# Patient Record
Sex: MALE | Race: WHITE | Employment: OTHER | RURAL
[De-identification: names, ages, dates, MRNs, and addresses within clinical notes are randomized per-mention and may not be internally consistent; named-entity substitution may affect disease eponyms.]

---

## 2017-06-15 ENCOUNTER — OFFICE VISIT (OUTPATIENT)
Dept: FAMILY MEDICINE CLINIC | Age: 72
End: 2017-06-15

## 2017-06-15 VITALS
HEART RATE: 62 BPM | SYSTOLIC BLOOD PRESSURE: 144 MMHG | WEIGHT: 233 LBS | OXYGEN SATURATION: 95 % | DIASTOLIC BLOOD PRESSURE: 76 MMHG | HEIGHT: 73 IN | RESPIRATION RATE: 18 BRPM | BODY MASS INDEX: 30.88 KG/M2 | TEMPERATURE: 98.1 F

## 2017-06-15 DIAGNOSIS — Z00.00 PREVENTATIVE HEALTH CARE: ICD-10-CM

## 2017-06-15 DIAGNOSIS — L98.9 NON-HEALING SKIN LESION OF NOSE: Primary | ICD-10-CM

## 2017-06-15 DIAGNOSIS — Z23 ENCOUNTER FOR IMMUNIZATION: ICD-10-CM

## 2017-06-15 DIAGNOSIS — R97.20 ELEVATED PSA: ICD-10-CM

## 2017-06-15 RX ORDER — MUPIROCIN 20 MG/G
OINTMENT TOPICAL DAILY
Qty: 22 G | Refills: 0 | Status: SHIPPED | OUTPATIENT
Start: 2017-06-15 | End: 2017-09-29 | Stop reason: ALTCHOICE

## 2017-06-15 NOTE — PROGRESS NOTES
Sravan Magallanes is a 66 y.o. male who presents to the office today with the following:  Chief Complaint   Patient presents with    Skin Exam     area on nose       HPI  Noticed spot on nose ~6 years ago. Says it has disappeared and reappeared over the years. Usually shows up in the summer and goes away until next summer. Believes it has even resolved for a couple years in a row. Notes lesion is not painful or itchy. Sometimes tingly. Does not bleed or drain, unless picks at it. Picked off last night, now scabbed over. Has been applying Neosporin twice daily. Currently has applied. Otherwise feeling well with no other complaints or acute concerns. Denies any other HEENT sxs. Also never returned for repeat PSA. Will get today. Would also like tdap updated while here. No other wounds/lesions, but gets them frequently, just never comes in. Review of Systems   Constitutional: Negative for chills, fever and malaise/fatigue. HENT: Negative for congestion, ear discharge, ear pain, hearing loss, nosebleeds, sore throat and tinnitus. No other nasal sxs   Eyes: Negative. Negative for blurred vision, double vision, photophobia, pain, discharge and redness. Respiratory: Negative for shortness of breath. Cardiovascular: Negative for chest pain. Gastrointestinal: Negative. Genitourinary: Negative. Skin: Negative for itching and rash. Lesion left external nose   Neurological: Negative for headaches. See HPI. No Known Allergies    Current Outpatient Prescriptions   Medication Sig    mupirocin (BACTROBAN) 2 % ointment Apply  to affected area daily. No current facility-administered medications for this visit. No past medical history on file. No past surgical history on file.     Social History     Social History    Marital status:      Spouse name: N/A    Number of children: N/A    Years of education: N/A     Social History Main Topics    Smoking status: Never Smoker    Smokeless tobacco: Never Used    Alcohol use Yes      Comment: 2    Drug use: No    Sexual activity: Yes     Other Topics Concern    None     Social History Narrative       Family History   Problem Relation Age of Onset    No Known Problems Mother     No Known Problems Father          Physical Exam:  Visit Vitals    /76 (BP 1 Location: Left arm, BP Patient Position: Sitting)    Pulse 62    Temp 98.1 °F (36.7 °C) (Oral)    Resp 18    Ht 6' 0.5\" (1.842 m)    Wt 233 lb (105.7 kg)    SpO2 95%    BMI 31.17 kg/m2     Physical Exam   Constitutional: He is oriented to person, place, and time and well-developed, well-nourished, and in no distress. HENT:   Head: Normocephalic and atraumatic. Right Ear: Tympanic membrane, external ear and ear canal normal.   Left Ear: Tympanic membrane, external ear and ear canal normal.   Nose: No mucosal edema, rhinorrhea, nose lacerations, sinus tenderness or nasal deformity. No epistaxis. No foreign bodies. Right sinus exhibits no maxillary sinus tenderness and no frontal sinus tenderness. Left sinus exhibits no maxillary sinus tenderness and no frontal sinus tenderness. Mouth/Throat: Uvula is midline, oropharynx is clear and moist and mucous membranes are normal.   ~1cm necrotic lesion with dried blood and some dark scabbing, moist from abx ointment but no drainage. Nonttp. Slight indentation on side more prominent L vs R nare. No other abnls. Eyes: Conjunctivae and EOM are normal.   Neck: Normal range of motion. Neck supple. Cardiovascular: Normal rate, regular rhythm and normal heart sounds. Pulmonary/Chest: Effort normal and breath sounds normal.   Abdominal: Soft. There is no tenderness. Lymphadenopathy:     He has no cervical adenopathy. Neurological: He is alert and oriented to person, place, and time. Gait normal.   Skin: Skin is warm and dry.    Psychiatric: Mood and affect normal.   Nursing note and vitals reviewed. Assessment/Plan:    ICD-10-CM ICD-9-CM    1. Non-healing skin lesion of nose L98.9 709.9 REFERRAL TO DERMATOLOGY      mupirocin (BACTROBAN) 2 % ointment      TETANUS, DIPHTHERIA TOXOIDS AND ACELLULAR PERTUSSIS VACCINE (TDAP), IN INDIVIDS. >=7, IM      HSV 1/2 AB, IGM- low suspicion but will check to be sure. 2. Encounter for immunization Z23 V03.89 TETANUS, DIPHTHERIA TOXOIDS AND ACELLULAR PERTUSSIS VACCINE (TDAP), IN INDIVIDS. >=7, IM   3. Preventative health care Z00.00 V70.0 TETANUS, DIPHTHERIA TOXOIDS AND ACELLULAR PERTUSSIS VACCINE (TDAP), IN INDIVIDS. >=7, IM   4. Elevated PSA R97.20 790.93 PSA W/ REFLX FREE PSA      COLLECTION VENOUS BLOOD,VENIPUNCTURE     Rec pt avoid picking!!! Provided ointment to apply short-term while healing due to small amt of yellow crust with scab and then instructed to d/c topicals completely, keep clean, and leave alone. F/u with Dermatology as instructed. Avoid UV exposure.      Follow-up Disposition:  Return in about 3 months (around 9/4/2017) for fasting labs/annual.    Eduar Jackson PA-C

## 2017-06-15 NOTE — MR AVS SNAPSHOT
Visit Information Date & Time Provider Department Dept. Phone Encounter #  
 6/15/2017  2:30 PM John Paul Girard PA-C [address] [id number] Upcoming Health Maintenance Date Due DTaP/Tdap/Td series (1 - Tdap) 10/25/1965 GLAUCOMA SCREENING Q2Y 10/25/2009 INFLUENZA AGE 9 TO ADULT 8/1/2017 MEDICARE YEARLY EXAM 9/27/2017 FOBT Q 1 YEAR AGE 50-75 9/27/2017 Allergies as of 6/15/2017  Review Complete On: 6/15/2017 By: John Paul Girard PA-C No Known Allergies Current Immunizations  Reviewed on 9/26/2016 Name Date Influenza High Dose Vaccine PF 9/26/2016 Pneumococcal Conjugate (PCV-13) 9/26/2016 Pneumococcal Polysaccharide (PPSV-23) 12/30/2014 Tdap  Incomplete Not reviewed this visit You Were Diagnosed With   
  
 Codes Comments Non-healing skin lesion of nose    -  Primary ICD-10-CM: L98.9 ICD-9-CM: 709.9 Encounter for immunization     ICD-10-CM: B09 ICD-9-CM: V03.89 Preventative health care     ICD-10-CM: Z00.00 ICD-9-CM: V70.0 Elevated PSA     ICD-10-CM: R97.20 ICD-9-CM: 790.93 Vitals BP Pulse Temp Resp Height(growth percentile) Weight(growth percentile) 144/76 (BP 1 Location: Left arm, BP Patient Position: Sitting) 62 98.1 °F (36.7 °C) (Oral) 18 6' 0.5\" (1.842 m) 233 lb (105.7 kg) SpO2 BMI Smoking Status 95% 31.17 kg/m2 Never Smoker Vitals History BMI and BSA Data Body Mass Index Body Surface Area  
 31.17 kg/m 2 2.33 m 2 Preferred Pharmacy Pharmacy Name Phone [facility] [place] [address] [phone] Your Updated Medication List  
  
   
This list is accurate as of: 6/15/17  2:57 PM.  Always use your most recent med list.  
  
  
  
  
 mupirocin 2 % ointment Commonly known as:  [facility] Apply  to affected area daily. Prescriptions Sent to Pharmacy Refills mupirocin (BACTROBAN) 2 % ointment 0 Sig: Apply  to affected area daily. Class: Normal  
 Pharmacy: [address] Ph #: [phone] Route: Topical  
  
We Performed the Following PSA W/ REFLX FREE PSA [57191 CPT(R)] REFERRAL TO DERMATOLOGY [REF19 Custom] TETANUS, DIPHTHERIA TOXOIDS AND ACELLULAR PERTUSSIS VACCINE (TDAP), IN INDIVIDS. >=7, IM [id number] CPT(R)] Referral Information Referral ID Referred By Referred To  
  
 [id number] Alissa Amador, DO   
   [address] [place] [address] Phone: [phone] Fax: [phone] Visits Status Start Date End Date 1 New Request 6/15/17 6/15/18 If your referral has a status of pending review or denied, additional information will be sent to support the outcome of this decision. Introducing [facility]! Zuleima Cleverly introduces [employer] patient portal. Now you can access parts of your medical record, email your doctor's office, and request medication refills online. 1. In your internet browser, go to [email or web address] 2. Click on the First Time User? Click Here link in the Sign In box. You will see the New Member Sign Up page. 3. Enter your [employer] Access Code exactly as it appears below. You will not need to use this code after youve completed the sign-up process. If you do not sign up before the expiration date, you must request a new code. · [employer] Access Code: [email or web address] Expires: 9/13/2017  2:57 PM 
 
4. Enter the last four digits of your Social Security Number (xxxx) and Date of Birth (mm/dd/yyyy) as indicated and click Submit. You will be taken to the next sign-up page. 5. Create a [employer] ID. This will be your [employer] login ID and cannot be changed, so think of one that is secure and easy to remember. 6. Create a [employer] password. You can change your password at any time. 7. Enter your Password Reset Question and Answer. This can be used at a later time if you forget your password. 8. Enter your e-mail address. You will receive e-mail notification when new information is available in [address] 9. Click Sign Up. You can now view and download portions of your medical record. 10. Click the Download Summary menu link to download a portable copy of your medical information. If you have questions, please visit the Frequently Asked Questions section of the [employer] website. Remember, [employer] is NOT to be used for urgent needs. For medical emergencies, dial 911. Now available from your iPhone and Android! Please provide this summary of care documentation to your next provider. Your primary care clinician is listed as Caridad Mckeon. If you have any questions after today's visit, please call [phone].

## 2017-06-17 LAB
HSV1+2 IGM SER IA-ACNC: <0.91 RATIO (ref 0–0.9)
PSA FREE MFR SERPL: 14.3 %
PSA FREE SERPL-MCNC: 0.87 NG/ML
PSA SERPL-MCNC: 6.1 NG/ML (ref 0–4)
REFLEX CRITERIA: ABNORMAL

## 2017-06-23 NOTE — PROGRESS NOTES
Notify pt lesion neg for HSV. Also, prostate test is still elevated and has increased from previous reading. He needs to be seen by Urology as discussed at visit. Referral provided, let me know if he has any questions.

## 2017-09-18 ENCOUNTER — OFFICE VISIT (OUTPATIENT)
Dept: FAMILY MEDICINE CLINIC | Age: 72
End: 2017-09-18

## 2017-09-18 VITALS
DIASTOLIC BLOOD PRESSURE: 79 MMHG | WEIGHT: 238 LBS | OXYGEN SATURATION: 99 % | RESPIRATION RATE: 16 BRPM | SYSTOLIC BLOOD PRESSURE: 126 MMHG | BODY MASS INDEX: 31.83 KG/M2 | HEART RATE: 49 BPM | TEMPERATURE: 95.5 F

## 2017-09-18 DIAGNOSIS — R82.90 ABNORMAL URINALYSIS: ICD-10-CM

## 2017-09-18 DIAGNOSIS — R00.1 BRADYCARDIA: Primary | ICD-10-CM

## 2017-09-18 DIAGNOSIS — Z00.00 WELL ADULT EXAM: ICD-10-CM

## 2017-09-18 DIAGNOSIS — Z13.29 THYROID DISORDER SCREEN: ICD-10-CM

## 2017-09-18 DIAGNOSIS — E78.00 HYPERCHOLESTEREMIA: ICD-10-CM

## 2017-09-18 DIAGNOSIS — N39.0 URINARY TRACT INFECTION WITHOUT HEMATURIA, SITE UNSPECIFIED: ICD-10-CM

## 2017-09-18 DIAGNOSIS — Z13.29 SCREENING FOR THYROID DISORDER: ICD-10-CM

## 2017-09-18 DIAGNOSIS — R94.31 ABNORMAL EKG: ICD-10-CM

## 2017-09-18 LAB
BILIRUB UR QL STRIP: NEGATIVE
GLUCOSE UR-MCNC: NEGATIVE MG/DL
KETONES P FAST UR STRIP-MCNC: NEGATIVE MG/DL
PH UR STRIP: 6.5 [PH] (ref 4.6–8)
PROT UR QL STRIP: NEGATIVE MG/DL
SP GR UR STRIP: 1.02 (ref 1–1.03)
UA UROBILINOGEN AMB POC: NORMAL (ref 0.2–1)
URINALYSIS CLARITY POC: NORMAL
URINALYSIS COLOR POC: YELLOW
URINE BLOOD POC: NORMAL
URINE LEUKOCYTES POC: NORMAL
URINE NITRITES POC: POSITIVE

## 2017-09-18 NOTE — PROGRESS NOTES
Chela Quintanilla is a 66 y.o. male who presents to the office today with the following:  Chief Complaint   Patient presents with    Follow-up       HPI  Here for f/u intermittent, but chronic skin lesion on nose. Sent to Dermatology. Was eval at [facility]. Had biopsy of lesion on left nasal crease. 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 Req no further tx. Pt says has resolved now, but thinks will still come back again next summer. Health Maintenance Due   Topic Date Due    GLAUCOMA SCREENING Q2Y  10/25/2009 Just went 2 mo ago, new pair glasses, unsure name René Bradley AGE 9 TO ADULT  08/01/2017 Declines.  FOBT Q 1 YEAR AGE 50-75  09/27/2017 Colonoscopy 3-4 yrs ago, reportedly [facility]. MR etienne Has been to Urology. Being followed for elev PSA. Has another apt in Dec.    Is fasting for annual labs. Otherwise feeling well with no other complaints or acute concerns. Review of Systems   Constitutional: Negative for chills, fever and malaise/fatigue. HENT: Negative for ear pain and sore throat. Eyes: Negative. Respiratory: Negative for cough and shortness of breath. Cardiovascular: Negative for chest pain and leg swelling. Gastrointestinal: Negative for abdominal pain, diarrhea, nausea and vomiting. Genitourinary: Negative. Musculoskeletal: Negative for myalgias. Skin: Negative for rash. Neurological: Negative for dizziness and headaches. Psychiatric/Behavioral: Negative. See HPI. No Known Allergies    Current Outpatient Prescriptions   Medication Sig    mupirocin (BACTROBAN) 2 % ointment Apply  to affected area daily. No current facility-administered medications for this visit. History reviewed. No pertinent past medical history. History reviewed. No pertinent surgical history.     Social History     Social History    Marital status:      Spouse name: N/A    Number of children: N/A  Years of education: N/A     Social History Main Topics    Smoking status: Never Smoker    Smokeless tobacco: Never Used    Alcohol use Yes      Comment: 2    Drug use: No    Sexual activity: Yes     Other Topics Concern    None     Social History Narrative       Family History   Problem Relation Age of Onset    No Known Problems Mother     No Known Problems Father          Physical Exam:  Visit Vitals    /79 (BP 1 Location: Right arm, BP Patient Position: Sitting)    Pulse (!) 49    Temp 95.5 °F (35.3 °C) (Oral)    Resp 16    Wt 238 lb (108 kg)    SpO2 99%    BMI 31.83 kg/m2     Physical Exam   Constitutional: He is oriented to person, place, and time and well-developed, well-nourished, and in no distress. HENT:   Head: Normocephalic and atraumatic. Right Ear: External ear normal.   Left Ear: External ear normal.   Nose: Nose normal.   Mouth/Throat: Oropharynx is clear and moist.   Eyes: Conjunctivae and EOM are normal.   Neck: Normal range of motion. Neck supple. No JVD present. No thyromegaly present. Cardiovascular: Regular rhythm, normal heart sounds and intact distal pulses. Bradycardia present. Pulmonary/Chest: Effort normal and breath sounds normal.   Abdominal: Soft. There is no tenderness. Musculoskeletal: Normal range of motion. He exhibits no edema. Lymphadenopathy:     He has no cervical adenopathy. Neurological: He is alert and oriented to person, place, and time. Gait normal.   Skin: Skin is warm and dry. Psychiatric: Mood and affect normal.   Nursing note and vitals reviewed. Assessment/Plan:    ICD-10-CM ICD-9-CM    1. Bradycardia R00.1 427.89 AMB POC EKG ROUTINE W/ 12 LEADS, INTER & REP      REFERRAL TO CARDIOLOGY   2. Abnormal EKG R94.31 794.31 REFERRAL TO CARDIOLOGY   3.  Well adult exam Z00.00 V70.0 LIPID PANEL      CBC WITH AUTOMATED DIFF      METABOLIC PANEL, COMPREHENSIVE      AMB POC URINALYSIS DIP STICK MANUAL W/O MICRO      TSH 3RD GENERATION 4. Hypercholesteremia E78.00 272.0 LIPID PANEL      NE HANDLG&/OR CONVEY OF SPEC FOR TR OFFICE TO LAB      NE COLLECTION VENOUS BLOOD,VENIPUNCTURE   5. Thyroid disorder screen Z13.29 V77.0 TSH 3RD GENERATION   6. Abnormal urinalysis R82.90 791.9 URINALYSIS W/ RFLX MICROSCOPIC      CULTURE, URINE     Results for orders placed or performed in visit on 09/18/17   AMB POC URINALYSIS DIP STICK MANUAL W/O MICRO   Result Value Ref Range    Color (UA POC) Yellow     Clarity (UA POC) Cloudy     Glucose (UA POC) Negative Negative    Bilirubin (UA POC) Negative Negative    Ketones (UA POC) Negative Negative    Specific gravity (UA POC) 1.020 1.001 - 1.035    Blood (UA POC) Trace Negative    pH (UA POC) 6.5 4.6 - 8.0    Protein (UA POC) Negative Negative mg/dL    Urobilinogen (UA POC) 0.2 mg/dL 0.2 - 1    Nitrites (UA POC) Positive Negative    Leukocyte esterase (UA POC) 1+ Negative   Neg for any  sxs. Discussed consistently low HR. Pt denies any extensive cardio ex. Does not really work-out at all. Has been this way per MR since at least last year, but pt is not really sure of how long. EKG somewhat abnl with sinus fab, low volt, and inv T-waves in a couple precordial leads. No known hear or pulmonary dz. No cardiopulmonary complaints or sxs. Discussed potential etiologies with pt from benign to a little more severe, rec official eval from cardiology and pt agrees to go for consult. Otherwise will follow labs for rec f/u. To try to monitor/keep log at home for visit. Seek immediate care/to ER in meantime for any red flag sxs. Pt verbalizes understanding and agrees with the plan. Follow-up pending results.     Frederick Dow PA-C

## 2017-09-18 NOTE — PATIENT INSTRUCTIONS
Bradycardia: Care Instructions  Your Care Instructions    Bradycardia is a slow heart rate. If your heart beats too slowly, it can't supply your body with enough blood. This can make you weak or dizzy. Or it may make you pass out. Sometimes medicine can cause this problem. If this happens, your doctor may have you adjust one of your medicines. If a medicine is not the problem, your doctor may recommend a pacemaker. It is important to treat bradycardia so that you don't get more serious health problems. Your doctor will want to see you on a routine schedule to make sure that your heartbeat is normal.  Follow-up care is a key part of your treatment and safety. Be sure to make and go to all appointments, and call your doctor if you are having problems. It's also a good idea to know your test results and keep a list of the medicines you take. How can you care for yourself at home? · Take your medicines exactly as prescribed. Call your doctor if you think you are having a problem with your medicine. If your bradycardia is caused by another disease, your doctor will try to treat the disease. If it is caused by heart medicines, he or she will adjust your medicines. · Make lifestyle changes to improve your heart health. ¨ To control your cholesterol, avoid foods with a lot of fat, saturated fat, or sodium. Try to eat more fiber. And if your doctor says it's okay, get some exercise on most days. ¨ Do not smoke. Smoking can make your heart condition worse. If you need help quitting, talk to your doctor about stop-smoking programs and medicines. These can increase your chances of quitting for good. ¨ Limit alcohol to 2 drinks a day for men and 1 drink a day for women. Too much alcohol can cause health problems. Pacemaker  If you have a pacemaker, you will get more specific information about it. Be sure to:  · Check your pulse as your doctor tells you.   · Have your pacemaker checked as often as your doctor recommends. You may be able to do this over the phone or computer. · Avoid strong magnetic or electrical fields. These include wand metal detectors used in airports, MRIs, welding equipment, and generators. · You will be checked several times right after you get your pacemaker and when it is time to have the battery changed. Batteries last for 5 to 15 years. · You can talk on a cell phone. But keep it 6 inches away from your pacemaker. · Microwaves, TVs, radios, and kitchen and bathroom appliances won't harm you. When should you call for help? Call 911 anytime you think you may need emergency care. For example, call if:  · You passed out (lost consciousness). · You have symptoms of a heart attack. These may include:  ¨ Chest pain or pressure, or a strange feeling in the chest.  ¨ Sweating. ¨ Shortness of breath. ¨ Nausea or vomiting. ¨ Pain, pressure, or a strange feeling in the back, neck, jaw, or upper belly or in one or both shoulders or arms. ¨ Lightheadedness or sudden weakness. ¨ A fast or irregular heartbeat. After you call 911, the  may tell you to chew 1 adult-strength or 2 to 4 low-dose aspirin. Wait for an ambulance. Do not try to drive yourself. · You have symptoms of a stroke. These may include:  ¨ Sudden numbness, tingling, weakness, or loss of movement in your face, arm, or leg, especially on only one side of your body. ¨ Sudden vision changes. ¨ Sudden trouble speaking. ¨ Sudden confusion or trouble understanding simple statements. ¨ Sudden problems with walking or balance. ¨ A sudden, severe headache that is different from past headaches. Call your doctor now or seek immediate medical care if:  · You are dizzy or lightheaded, or you feel like you may faint. · You have new or increased shortness of breath. · You had a pacemaker implanted and you have signs of infection, such as:  ¨ Increased pain, swelling, warmth, or redness.   ¨ Red streaks leading from the cut (incision). ¨ Pus draining from the incision. ¨ A fever. Watch closely for changes in your health, and be sure to contact your doctor if you have any problems. Where can you learn more? Go to http://marco-bernabe.info/. Enter Y351 in the search box to learn more about \"Bradycardia: Care Instructions. \"  Current as of: April 3, 2017  Content Version: 11.3  © 4183-8846 [employer]. Care instructions adapted under license by [employer] (which disclaims liability or warranty for this information). If you have questions about a medical condition or this instruction, always ask your healthcare professional. [place] [address] any warranty or liability for your use of this information. Well Visit, Over 72: Care Instructions  Your Care Instructions  Physical exams can help you stay healthy. Your doctor has checked your overall health and may have suggested ways to take good care of yourself. He or she also may have recommended tests. At home, you can help prevent illness with healthy eating, regular exercise, and other steps. Follow-up care is a key part of your treatment and safety. Be sure to make and go to all appointments, and call your doctor if you are having problems. It's also a good idea to know your test results and keep a list of the medicines you take. How can you care for yourself at home? · Reach and stay at a healthy weight. This will lower your risk for many problems, such as obesity, diabetes, heart disease, and high blood pressure. · Get at least 30 minutes of exercise on most days of the week. Walking is a good choice. You also may want to do other activities, such as running, swimming, cycling, or playing tennis or team sports. · Do not smoke. Smoking can make health problems worse. If you need help quitting, talk to your doctor about stop-smoking programs and medicines.  These can increase your chances of quitting for good.  · Protect your skin from too much sun. When you're outdoors from 10 a.m. to 4 p.m., stay in the shade or cover up with clothing and a hat with a wide brim. Wear sunglasses that block UV rays. Even when it's cloudy, put broad-spectrum sunscreen (SPF 30 or higher) on any exposed skin. · See a dentist one or two times a year for checkups and to have your teeth cleaned. · Wear a seat belt in the car. · Limit alcohol to 2 drinks a day for men and 1 drink a day for women. Too much alcohol can cause health problems. Follow your doctor's advice about when to have certain tests. These tests can spot problems early. For men and women  · Cholesterol. Your doctor will tell you how often to have this done based on your overall health and other things that can increase your risk for heart attack and stroke. · Blood pressure. Have your blood pressure checked during a routine doctor visit. Your doctor will tell you how often to check your blood pressure based on your age, your blood pressure results, and other factors. · Diabetes. Ask your doctor whether you should have tests for diabetes. · Vision. Experts recommend that you have yearly exams for glaucoma and other age-related eye problems. · Hearing. Tell your doctor if you notice any change in your hearing. You can have tests to find out how well you hear. · Colon cancer tests. Keep having colon cancer tests as your doctor recommends. You can have one of several types of tests. · Heart attack and stroke risk. At least every 4 to 6 years, you should have your risk for heart attack and stroke assessed. Your doctor uses factors such as your age, blood pressure, cholesterol, and whether you smoke or have diabetes to show what your risk for a heart attack or stroke is over the next 10 years. · Osteoporosis. Talk to your doctor about whether you should have a bone density test to find out whether you have thinning bones.  Also ask your doctor about whether you should take calcium and vitamin D supplements. For women  · Pap test and pelvic exam. You may no longer need a Pap test. Talk with your doctor about whether to stop or continue to have Pap tests. · Breast exam and mammogram. Ask how often you should have a mammogram, which is an X-ray of your breasts. A mammogram can spot breast cancer before it can be felt and when it is easiest to treat. · Thyroid disease. Talk to your doctor about whether to have your thyroid checked as part of a regular physical exam. Women have an increased chance of a thyroid problem. For men  · Prostate exam. Talk to your doctor about whether you should have a blood test (called a PSA test) for prostate cancer. Experts disagree on whether men should have this test. Some experts recommend that you discuss the benefits and risks of the test with your doctor. · Abdominal aortic aneurysm. Ask your doctor whether you should have a test to check for an aneurysm. You may need a test if you ever smoked or if your parent, brother, sister, or child has had an aneurysm. When should you call for help? Watch closely for changes in your health, and be sure to contact your doctor if you have any problems or symptoms that concern you. Where can you learn more? Go to http://marco-bernabe.info/. Enter B359 in the search box to learn more about \"Well Visit, Over 65: Care Instructions. \"  Current as of: July 19, 2016  Content Version: 11.3  © 2783-6371 [employer]. Care instructions adapted under license by [employer] (which disclaims liability or warranty for this information). If you have questions about a medical condition or this instruction, always ask your healthcare professional. [place] [address] any warranty or liability for your use of this information. High Cholesterol: Care Instructions  Your Care Instructions  Cholesterol is a type of fat in your blood.  It is needed for many body functions, such as making new cells. Cholesterol is made by your body. It also comes from food you eat. High cholesterol means that you have too much of the fat in your blood. This raises your risk of a heart attack and stroke. LDL and HDL are part of your total cholesterol. LDL is the \"bad\" cholesterol. High LDL can raise your risk for heart disease, heart attack, and stroke. HDL is the \"good\" cholesterol. It helps clear bad cholesterol from the body. High HDL is linked with a lower risk of heart disease, heart attack, and stroke. Your cholesterol levels help your doctor find out your risk for having a heart attack or stroke. You and your doctor can talk about whether you need to lower your risk and what treatment is best for you. A heart-healthy lifestyle along with medicines can help lower your cholesterol and your risk. The way you choose to lower your risk will depend on how high your risk is for heart attack and stroke. It will also depend on how you feel about taking medicines. Follow-up care is a key part of your treatment and safety. Be sure to make and go to all appointments, and call your doctor if you are having problems. It's also a good idea to know your test results and keep a list of the medicines you take. How can you care for yourself at home? · Eat a variety of foods every day. Good choices include fruits, vegetables, whole grains (like oatmeal), dried beans and peas, nuts and seeds, soy products (like tofu), and fat-free or low-fat dairy products. · Replace butter, margarine, and hydrogenated or partially hydrogenated oils with olive and canola oils. (Canola oil margarine without trans fat is fine.)  · Replace red meat with fish, poultry, and soy protein (like tofu). · Limit processed and packaged foods like chips, crackers, and cookies. · Bake, broil, or steam foods. Don't pavon them. · Be physically active. Get at least 30 minutes of exercise on most days of the week.  Walking is a good choice. You also may want to do other activities, such as running, swimming, cycling, or playing tennis or team sports. · Stay at a healthy weight or lose weight by making the changes in eating and physical activity listed above. Losing just a small amount of weight, even 5 to 10 pounds, can reduce your risk for having a heart attack or stroke. · Do not smoke. When should you call for help? Watch closely for changes in your health, and be sure to contact your doctor if:  · You need help making lifestyle changes. · You have questions about your medicine. Where can you learn more? Go to http://marcoPlanet Ivybernabe.info/. Enter P774 in the search box to learn more about \"High Cholesterol: Care Instructions. \"  Current as of: April 3, 2017  Content Version: 11.3  © 5766-0978 [employer]. Care instructions adapted under license by [employer] (which disclaims liability or warranty for this information). If you have questions about a medical condition or this instruction, always ask your healthcare professional. [address] any warranty or liability for your use of this information. Learning About High Cholesterol  What is high cholesterol? Cholesterol is a type of fat in your blood. It is needed for many body functions, such as making new cells. Cholesterol is made by your body. It also comes from food you eat. If you have too much cholesterol, it starts to build up in your arteries. This is called hardening of the arteries, or atherosclerosis. High cholesterol raises your risk of a heart attack and stroke. There are different types of cholesterol. LDL is the \"bad\" cholesterol. High LDL can raise your risk for heart disease, heart attack, and stroke. HDL is the \"good\" cholesterol. High HDL is linked with a lower risk for heart disease, heart attack, and stroke.   Your cholesterol levels help your doctor find out your risk for having a heart attack or stroke. How can you prevent high cholesterol? A heart-healthy lifestyle can help you prevent high cholesterol. This lifestyle helps lower your risk for a heart attack and stroke. · Eat heart-healthy foods. ¨ Eat fruits, vegetables, whole grains (like oatmeal), dried beans and peas, nuts and seeds, soy products (like tofu), and fat-free or low-fat dairy products. ¨ Replace butter, margarine, and hydrogenated or partially hydrogenated oils with olive and canola oils. (Canola oil margarine without trans fat is fine.)  ¨ Replace red meat with fish, poultry, and soy protein (like tofu). ¨ Limit processed and packaged foods like chips, crackers, and cookies. · Be active. Exercise can improve your cholesterol level. Get at least 30 minutes of exercise on most days of the week. Walking is a good choice. You also may want to do other activities, such as running, swimming, cycling, or playing tennis or team sports. · Stay at a healthy weight. Lose weight if you need to. · Don't smoke. If you need help quitting, talk to your doctor about stop-smoking programs and medicines. These can increase your chances of quitting for good. How is high cholesterol treated? The goal of treatment is to reduce your chances of having a heart attack or stroke. The goal is not to lower your cholesterol numbers only. · You may make lifestyle changes, such as eating healthy foods, not smoking, losing weight, and being more active. · You may have to take medicine. Follow-up care is a key part of your treatment and safety. Be sure to make and go to all appointments, and call your doctor if you are having problems. It's also a good idea to know your test results and keep a list of the medicines you take. Where can you learn more? Go to http://marco-bernabe.info/. Enter I991 in the search box to learn more about \"Learning About High Cholesterol. \"  Current as of: April 3, 2017  Content Version: 11.3  © 5842-4000 [employer]. Care instructions adapted under license by [employer] (which disclaims liability or warranty for this information). If you have questions about a medical condition or this instruction, always ask your healthcare professional. [address] any warranty or liability for your use of this information.

## 2017-09-18 NOTE — MR AVS SNAPSHOT
Visit Information Date & Time Provider Department Dept. Phone Encounter #  
 9/18/2017 10:30 AM Tonia Cadet PA-C [address] [id number] Follow-up Instructions Return in about 1 year (around 9/18/2018), or sooner. Upcoming Health Maintenance Date Due  
 GLAUCOMA SCREENING Q2Y 10/25/2009 FOBT Q 1 YEAR AGE 50-75 9/27/2017 MEDICARE YEARLY EXAM 9/27/2017 DTaP/Tdap/Td series (2 - Td) 6/15/2027 Allergies as of 9/18/2017  Review Complete On: 9/18/2017 By: Tonia Cadet PA-C No Known Allergies Current Immunizations  Reviewed on 6/15/2017 Name Date Influenza High Dose Vaccine PF 9/26/2016 Pneumococcal Conjugate (PCV-13) 9/26/2016 Pneumococcal Polysaccharide (PPSV-23) 12/30/2014 Tdap 6/15/2017  2:59 PM  
  
 Not reviewed this visit You Were Diagnosed With   
  
 Codes Comments Bradycardia    -  Primary ICD-10-CM: R00.1 ICD-9-CM: 427.89 Abnormal EKG     ICD-10-CM: R94.31 
ICD-9-CM: 794.31 Well adult exam     ICD-10-CM: Z00.00 ICD-9-CM: V70.0 Hypercholesteremia     ICD-10-CM: E78.00 ICD-9-CM: 272.0 Thyroid disorder screen     ICD-10-CM: Z13.29 ICD-9-CM: V77.0 Vitals BP Pulse Temp Resp Weight(growth percentile) SpO2  
 126/79 (BP 1 Location: Right arm, BP Patient Position: Sitting) (!) 49 95.5 °F (35.3 °C) (Oral) 16 238 lb (108 kg) 99% BMI Smoking Status 31.83 kg/m2 Never Smoker BMI and BSA Data Body Mass Index Body Surface Area  
 31.83 kg/m 2 2.35 m 2 Preferred Pharmacy Pharmacy Name Phone [facility] [address], [place] [address] [phone] Your Updated Medication List  
  
   
This list is accurate as of: 9/18/17 12:04 PM.  Always use your most recent med list.  
  
  
  
  
 mupirocin 2 % ointment Commonly known as:  [facility] Apply  to affected area daily. We Performed the Following AMB POC EKG ROUTINE W/ 12 LEADS, INTER & REP [67249 CPT(R)] AMB POC URINALYSIS DIP STICK MANUAL W/O MICRO [93021 CPT(R)] CBC WITH AUTOMATED DIFF [32913 CPT(R)] LIPID PANEL [86594 CPT(R)] METABOLIC PANEL, COMPREHENSIVE [42574 CPT(R)] REFERRAL TO CARDIOLOGY [JZJ15 Custom] [facility] 3RD GENERATION [41891 CPT(R)] Follow-up Instructions Return in about 1 year (around 9/18/2018), or sooner. Referral Information Referral ID Referred By Referred To  
  
 [id number] Derrell Carroll MD   
   [address] [place] [address] Way Phone: [phone] Fax: [phone] Visits Status Start Date End Date 1 New Request 9/18/17 9/18/18 If your referral has a status of pending review or denied, additional information will be sent to support the outcome of this decision. Patient Instructions Bradycardia: Care Instructions Your Care Instructions Bradycardia is a slow heart rate. If your heart beats too slowly, it can't supply your body with enough blood. This can make you weak or dizzy. Or it may make you pass out. Sometimes medicine can cause this problem. If this happens, your doctor may have you adjust one of your medicines. If a medicine is not the problem, your doctor may recommend a pacemaker. It is important to treat bradycardia so that you don't get more serious health problems. Your doctor will want to see you on a routine schedule to make sure that your heartbeat is normal. 
Follow-up care is a key part of your treatment and safety. Be sure to make and go to all appointments, and call your doctor if you are having problems. It's also a good idea to know your test results and keep a list of the medicines you take. How can you care for yourself at home? · Take your medicines exactly as prescribed.  Call your doctor if you think you are having a problem with your medicine. If your bradycardia is caused by another disease, your doctor will try to treat the disease. If it is caused by heart medicines, he or she will adjust your medicines. · Make lifestyle changes to improve your heart health. ¨ To control your cholesterol, avoid foods with a lot of fat, saturated fat, or sodium. Try to eat more fiber. And if your doctor says it's okay, get some exercise on most days. ¨ Do not smoke. Smoking can make your heart condition worse. If you need help quitting, talk to your doctor about stop-smoking programs and medicines. These can increase your chances of quitting for good. ¨ Limit alcohol to 2 drinks a day for men and 1 drink a day for women. Too much alcohol can cause health problems. Pacemaker If you have a pacemaker, you will get more specific information about it. Be sure to: · Check your pulse as your doctor tells you. · Have your pacemaker checked as often as your doctor recommends. You may be able to do this over the phone or computer. · Avoid strong magnetic or electrical fields. These include wand metal detectors used in airports, MRIs, welding equipment, and generators. · You will be checked several times right after you get your pacemaker and when it is time to have the battery changed. Batteries last for 5 to 15 years. · You can talk on a cell phone. But keep it 6 inches away from your pacemaker. · Microwaves, TVs, radios, and kitchen and bathroom appliances won't harm you. When should you call for help? Call 911 anytime you think you may need emergency care. For example, call if: 
· You passed out (lost consciousness). · You have symptoms of a heart attack. These may include: ¨ Chest pain or pressure, or a strange feeling in the chest. 
¨ Sweating. ¨ Shortness of breath. ¨ Nausea or vomiting. ¨ Pain, pressure, or a strange feeling in the back, neck, jaw, or upper belly or in one or both shoulders or arms. ¨ Lightheadedness or sudden weakness. ¨ A fast or irregular heartbeat. After you call 911, the  may tell you to chew 1 adult-strength or 2 to 4 low-dose aspirin. Wait for an ambulance. Do not try to drive yourself. · You have symptoms of a stroke. These may include: 
¨ Sudden numbness, tingling, weakness, or loss of movement in your face, arm, or leg, especially on only one side of your body. ¨ Sudden vision changes. ¨ Sudden trouble speaking. ¨ Sudden confusion or trouble understanding simple statements. ¨ Sudden problems with walking or balance. ¨ A sudden, severe headache that is different from past headaches. Call your doctor now or seek immediate medical care if: 
· You are dizzy or lightheaded, or you feel like you may faint. · You have new or increased shortness of breath. · You had a pacemaker implanted and you have signs of infection, such as: 
¨ Increased pain, swelling, warmth, or redness. ¨ Red streaks leading from the cut (incision). ¨ Pus draining from the incision. ¨ A fever. Watch closely for changes in your health, and be sure to contact your doctor if you have any problems. Where can you learn more? Go to http://marco-bernabe.info/. Enter K915 in the search box to learn more about \"Bradycardia: Care Instructions. \" Current as of: April 3, 2017 Content Version: 11.3 © 2479-4730 [employer]. Care instructions adapted under license by [employer] (which disclaims liability or warranty for this information). If you have questions about a medical condition or this instruction, always ask your healthcare professional. [place] [address] any warranty or liability for your use of this information. Well Visit, Over 72: Care Instructions Your Care Instructions Physical exams can help you stay healthy. Your doctor has checked your overall health and may have suggested ways to take good care of yourself. He or she also may have recommended tests. At home, you can help prevent illness with healthy eating, regular exercise, and other steps. Follow-up care is a key part of your treatment and safety. Be sure to make and go to all appointments, and call your doctor if you are having problems. It's also a good idea to know your test results and keep a list of the medicines you take. How can you care for yourself at home? · Reach and stay at a healthy weight. This will lower your risk for many problems, such as obesity, diabetes, heart disease, and high blood pressure. · Get at least 30 minutes of exercise on most days of the week. Walking is a good choice. You also may want to do other activities, such as running, swimming, cycling, or playing tennis or team sports. · Do not smoke. Smoking can make health problems worse. If you need help quitting, talk to your doctor about stop-smoking programs and medicines. These can increase your chances of quitting for good. · Protect your skin from too much sun. When you're outdoors from 10 a.m. to 4 p.m., stay in the shade or cover up with clothing and a hat with a wide brim. Wear sunglasses that block UV rays. Even when it's cloudy, put broad-spectrum sunscreen (SPF 30 or higher) on any exposed skin. · See a dentist one or two times a year for checkups and to have your teeth cleaned. · Wear a seat belt in the car. · Limit alcohol to 2 drinks a day for men and 1 drink a day for women. Too much alcohol can cause health problems. Follow your doctor's advice about when to have certain tests. These tests can spot problems early. For men and women · Cholesterol. Your doctor will tell you how often to have this done based on your overall health and other things that can increase your risk for heart attack and stroke. · Blood pressure. Have your blood pressure checked during a routine doctor visit.  Your doctor will tell you how often to check your blood pressure based on your age, your blood pressure results, and other factors. · Diabetes. Ask your doctor whether you should have tests for diabetes. · Vision. Experts recommend that you have yearly exams for glaucoma and other age-related eye problems. · Hearing. Tell your doctor if you notice any change in your hearing. You can have tests to find out how well you hear. · Colon cancer tests. Keep having colon cancer tests as your doctor recommends. You can have one of several types of tests. · Heart attack and stroke risk. At least every 4 to 6 years, you should have your risk for heart attack and stroke assessed. Your doctor uses factors such as your age, blood pressure, cholesterol, and whether you smoke or have diabetes to show what your risk for a heart attack or stroke is over the next 10 years. · Osteoporosis. Talk to your doctor about whether you should have a bone density test to find out whether you have thinning bones. Also ask your doctor about whether you should take calcium and vitamin D supplements. For women · Pap test and pelvic exam. You may no longer need a Pap test. Talk with your doctor about whether to stop or continue to have Pap tests. · Breast exam and mammogram. Ask how often you should have a mammogram, which is an X-ray of your breasts. A mammogram can spot breast cancer before it can be felt and when it is easiest to treat. · Thyroid disease. Talk to your doctor about whether to have your thyroid checked as part of a regular physical exam. Women have an increased chance of a thyroid problem. For men · Prostate exam. Talk to your doctor about whether you should have a blood test (called a PSA test) for prostate cancer. Experts disagree on whether men should have this test. Some experts recommend that you discuss the benefits and risks of the test with your doctor. · Abdominal aortic aneurysm.  Ask your doctor whether you should have a test to check for an aneurysm. You may need a test if you ever smoked or if your parent, brother, sister, or child has had an aneurysm. When should you call for help? Watch closely for changes in your health, and be sure to contact your doctor if you have any problems or symptoms that concern you. Where can you learn more? Go to http://marco-bernabe.info/. Enter B835 in the search box to learn more about \"Well Visit, Over 65: Care Instructions. \" Current as of: July 19, 2016 Content Version: 11.3 © 2494-4906 [employer]. Care instructions adapted under license by [employer] (which disclaims liability or warranty for this information). If you have questions about a medical condition or this instruction, always ask your healthcare professional. [address] any warranty or liability for your use of this information. High Cholesterol: Care Instructions Your Care Instructions Cholesterol is a type of fat in your blood. It is needed for many body functions, such as making new cells. Cholesterol is made by your body. It also comes from food you eat. High cholesterol means that you have too much of the fat in your blood. This raises your risk of a heart attack and stroke. LDL and HDL are part of your total cholesterol. LDL is the \"bad\" cholesterol. High LDL can raise your risk for heart disease, heart attack, and stroke. HDL is the \"good\" cholesterol. It helps clear bad cholesterol from the body. High HDL is linked with a lower risk of heart disease, heart attack, and stroke. Your cholesterol levels help your doctor find out your risk for having a heart attack or stroke. You and your doctor can talk about whether you need to lower your risk and what treatment is best for you. A heart-healthy lifestyle along with medicines can help lower your cholesterol and your risk.  The way you choose to lower your risk will depend on how high your risk is for heart attack and stroke. It will also depend on how you feel about taking medicines. Follow-up care is a key part of your treatment and safety. Be sure to make and go to all appointments, and call your doctor if you are having problems. It's also a good idea to know your test results and keep a list of the medicines you take. How can you care for yourself at home? · Eat a variety of foods every day. Good choices include fruits, vegetables, whole grains (like oatmeal), dried beans and peas, nuts and seeds, soy products (like tofu), and fat-free or low-fat dairy products. · Replace butter, margarine, and hydrogenated or partially hydrogenated oils with olive and canola oils. (Canola oil margarine without trans fat is fine.) · Replace red meat with fish, poultry, and soy protein (like tofu). · Limit processed and packaged foods like chips, crackers, and cookies. · Bake, broil, or steam foods. Don't pavon them. · Be physically active. Get at least 30 minutes of exercise on most days of the week. Walking is a good choice. You also may want to do other activities, such as running, swimming, cycling, or playing tennis or team sports. · Stay at a healthy weight or lose weight by making the changes in eating and physical activity listed above. Losing just a small amount of weight, even 5 to 10 pounds, can reduce your risk for having a heart attack or stroke. · Do not smoke. When should you call for help? Watch closely for changes in your health, and be sure to contact your doctor if: 
· You need help making lifestyle changes. · You have questions about your medicine. Where can you learn more? Go to http://marco-bernabe.info/. Enter O382 in the search box to learn more about \"High Cholesterol: Care Instructions. \" Current as of: April 3, 2017 Content Version: 11.3 © 7345-6613 [employer].  Care instructions adapted under license by [address] (which disclaims liability or warranty for this information). If you have questions about a medical condition or this instruction, always ask your healthcare professional. [address] any warranty or liability for your use of this information. Learning About High Cholesterol What is high cholesterol? Cholesterol is a type of fat in your blood. It is needed for many body functions, such as making new cells. Cholesterol is made by your body. It also comes from food you eat. If you have too much cholesterol, it starts to build up in your arteries. This is called hardening of the arteries, or atherosclerosis. High cholesterol raises your risk of a heart attack and stroke. There are different types of cholesterol. LDL is the \"bad\" cholesterol. High LDL can raise your risk for heart disease, heart attack, and stroke. HDL is the \"good\" cholesterol. High HDL is linked with a lower risk for heart disease, heart attack, and stroke. Your cholesterol levels help your doctor find out your risk for having a heart attack or stroke. How can you prevent high cholesterol? A heart-healthy lifestyle can help you prevent high cholesterol. This lifestyle helps lower your risk for a heart attack and stroke. · Eat heart-healthy foods. ¨ Eat fruits, vegetables, whole grains (like oatmeal), dried beans and peas, nuts and seeds, soy products (like tofu), and fat-free or low-fat dairy products. ¨ Replace butter, margarine, and hydrogenated or partially hydrogenated oils with olive and canola oils. (Canola oil margarine without trans fat is fine.) ¨ Replace red meat with fish, poultry, and soy protein (like tofu). ¨ Limit processed and packaged foods like chips, crackers, and cookies. · Be active. Exercise can improve your cholesterol level. Get at least 30 minutes of exercise on most days of the week. Walking is a good choice. You also may want to do other activities, such as running, swimming, cycling, or playing tennis or team sports. · Stay at a healthy weight. Lose weight if you need to. · Don't smoke. If you need help quitting, talk to your doctor about stop-smoking programs and medicines. These can increase your chances of quitting for good. How is high cholesterol treated? The goal of treatment is to reduce your chances of having a heart attack or stroke. The goal is not to lower your cholesterol numbers only. · You may make lifestyle changes, such as eating healthy foods, not smoking, losing weight, and being more active. · You may have to take medicine. Follow-up care is a key part of your treatment and safety. Be sure to make and go to all appointments, and call your doctor if you are having problems. It's also a good idea to know your test results and keep a list of the medicines you take. Where can you learn more? Go to http://marco-bernabe.info/. Enter S095 in the search box to learn more about \"Learning About High Cholesterol. \" Current as of: April 3, 2017 Content Version: 11.3 © 3428-7409 [employer]. Care instructions adapted under license by [employer] (which disclaims liability or warranty for this information). If you have questions about a medical condition or this instruction, always ask your healthcare professional. [address] any warranty or liability for your use of this information. Introducing [facility]! J Carlos Nash introduces [employer] patient portal. Now you can access parts of your medical record, email your doctor's office, and request medication refills online. 1. In your internet browser, go to [email or web address] 2. Click on the First Time User? Click Here link in the Sign In box. You will see the New Member Sign Up page. 3. Enter your [employer] Access Code exactly as it appears below. You will not need to use this code after youve completed the sign-up process. If you do not sign up before the expiration date, you must request a new code. · [employer] Access Code: [email or web address] Expires: 12/17/2017 10:52 AM 
 
4. Enter the last four digits of your Social Security Number (xxxx) and Date of Birth (mm/dd/yyyy) as indicated and click Submit. You will be taken to the next sign-up page. 5. Create a [employer] ID. This will be your [employer] login ID and cannot be changed, so think of one that is secure and easy to remember. 6. Create a [employer] password. You can change your password at any time. 7. Enter your Password Reset Question and Answer. This can be used at a later time if you forget your password. 8. Enter your e-mail address. You will receive e-mail notification when new information is available in [address]. 9. Click Sign Up. You can now view and download portions of your medical record. 10. Click the Download Summary menu link to download a portable copy of your medical information. If you have questions, please visit the Frequently Asked Questions section of the [employer] website. Remember, [employer] is NOT to be used for urgent needs. For medical emergencies, dial 911. Now available from your iPhone and Android! Please provide this summary of care documentation to your next provider. Your primary care clinician is listed as Vinnie Santizo. If you have any questions after today's visit, please call [phone].

## 2017-09-18 NOTE — PROGRESS NOTES
Chief Complaint   Patient presents with    Follow-up     Visit Vitals    /79 (BP 1 Location: Right arm, BP Patient Position: Sitting)    Pulse (!) 49    Temp 95.5 °F (35.3 °C) (Oral)    Resp 16    Wt 238 lb (108 kg)    SpO2 99%    BMI 31.83 kg/m2     Justice Fernandez LPN

## 2017-09-20 LAB
ALBUMIN SERPL-MCNC: 4.6 G/DL (ref 3.5–4.8)
ALBUMIN/GLOB SERPL: 1.6 {RATIO} (ref 1.2–2.2)
ALP SERPL-CCNC: 77 IU/L (ref 39–117)
ALT SERPL-CCNC: 16 IU/L (ref 0–44)
APPEARANCE UR: CLEAR
AST SERPL-CCNC: 20 IU/L (ref 0–40)
BACTERIA #/AREA URNS HPF: ABNORMAL /[HPF]
BASOPHILS # BLD AUTO: 0.1 X10E3/UL (ref 0–0.2)
BASOPHILS NFR BLD AUTO: 1 %
BILIRUB SERPL-MCNC: 0.6 MG/DL (ref 0–1.2)
BILIRUB UR QL STRIP: NEGATIVE
BUN SERPL-MCNC: 14 MG/DL (ref 8–27)
BUN/CREAT SERPL: 16 (ref 10–24)
CALCIUM SERPL-MCNC: 9.7 MG/DL (ref 8.6–10.2)
CASTS URNS QL MICRO: ABNORMAL /LPF
CHLORIDE SERPL-SCNC: 100 MMOL/L (ref 96–106)
CHOLEST SERPL-MCNC: 303 MG/DL (ref 100–199)
CO2 SERPL-SCNC: 24 MMOL/L (ref 18–29)
COLOR UR: YELLOW
CREAT SERPL-MCNC: 0.89 MG/DL (ref 0.76–1.27)
EOSINOPHIL # BLD AUTO: 0.1 X10E3/UL (ref 0–0.4)
EOSINOPHIL NFR BLD AUTO: 1 %
EPI CELLS #/AREA URNS HPF: ABNORMAL /HPF
ERYTHROCYTE [DISTWIDTH] IN BLOOD BY AUTOMATED COUNT: 14 % (ref 12.3–15.4)
GLOBULIN SER CALC-MCNC: 2.9 G/DL (ref 1.5–4.5)
GLUCOSE SERPL-MCNC: 94 MG/DL (ref 65–99)
GLUCOSE UR QL: NEGATIVE
HCT VFR BLD AUTO: 44 % (ref 37.5–51)
HDLC SERPL-MCNC: 36 MG/DL
HGB BLD-MCNC: 15.2 G/DL (ref 12.6–17.7)
HGB UR QL STRIP: NEGATIVE
IMM GRANULOCYTES # BLD: 0 X10E3/UL (ref 0–0.1)
IMM GRANULOCYTES NFR BLD: 0 %
INTERPRETATION, 910389: NORMAL
KETONES UR QL STRIP: NEGATIVE
LDLC SERPL CALC-MCNC: 221 MG/DL (ref 0–99)
LEUKOCYTE ESTERASE UR QL STRIP: ABNORMAL
LYMPHOCYTES # BLD AUTO: 2.2 X10E3/UL (ref 0.7–3.1)
LYMPHOCYTES NFR BLD AUTO: 40 %
MCH RBC QN AUTO: 33.3 PG (ref 26.6–33)
MCHC RBC AUTO-ENTMCNC: 34.5 G/DL (ref 31.5–35.7)
MCV RBC AUTO: 97 FL (ref 79–97)
MICRO URNS: ABNORMAL
MONOCYTES # BLD AUTO: 0.6 X10E3/UL (ref 0.1–0.9)
MONOCYTES NFR BLD AUTO: 11 %
MUCOUS THREADS URNS QL MICRO: PRESENT
NEUTROPHILS # BLD AUTO: 2.5 X10E3/UL (ref 1.4–7)
NEUTROPHILS NFR BLD AUTO: 47 %
NITRITE UR QL STRIP: POSITIVE
PH UR STRIP: 6.5 [PH] (ref 5–7.5)
PLATELET # BLD AUTO: 250 X10E3/UL (ref 150–379)
POTASSIUM SERPL-SCNC: 5.1 MMOL/L (ref 3.5–5.2)
PROT SERPL-MCNC: 7.5 G/DL (ref 6–8.5)
PROT UR QL STRIP: NEGATIVE
RBC # BLD AUTO: 4.56 X10E6/UL (ref 4.14–5.8)
RBC #/AREA URNS HPF: ABNORMAL /HPF
SODIUM SERPL-SCNC: 141 MMOL/L (ref 134–144)
SP GR UR: 1.02 (ref 1–1.03)
TRIGL SERPL-MCNC: 228 MG/DL (ref 0–149)
TSH SERPL DL<=0.005 MIU/L-ACNC: 2.07 UIU/ML (ref 0.45–4.5)
UROBILINOGEN UR STRIP-MCNC: 0.2 MG/DL (ref 0.2–1)
VLDLC SERPL CALC-MCNC: 46 MG/DL (ref 5–40)
WBC # BLD AUTO: 5.4 X10E3/UL (ref 3.4–10.8)
WBC #/AREA URNS HPF: >30 /HPF

## 2017-09-21 LAB — BACTERIA UR CULT: ABNORMAL

## 2017-09-21 RX ORDER — CIPROFLOXACIN 500 MG/1
500 TABLET ORAL 2 TIMES DAILY
Qty: 14 TAB | Refills: 0 | Status: SHIPPED | OUTPATIENT
Start: 2017-09-21 | End: 2017-09-29

## 2017-09-21 NOTE — PROGRESS NOTES
Please notify pt his urine culture came back positive for infection in his urine. I can call in some abx. Please confirm no known drug allergies and the pharmacy he would like it to be sent to. Should drink plenty of water, avoid caffeine/carbonation, avoid holding urine. Let me know if he has any questions. Should leave repeat urine specimen ~2weeks after tx. Also let him know his labs came back wnl, except his LDL \"bad\" chol and trig are high and \"good\" HDL chol is low. Should avoid foods that are high in chol and trans/saturated fats, may take omega-3 fish oil and eat healthy fats in diet, and try to get regular aerobic ex. At least 30 min a day.

## 2017-09-21 NOTE — PROGRESS NOTES
Patient notified about labs and patient does not have any symptoms is not allergic to any medications will repeat urine after antibiotic would like rx sent to [address] in [address] [facility]

## 2017-09-22 ENCOUNTER — TELEPHONE (OUTPATIENT)
Dept: FAMILY MEDICINE CLINIC | Age: 72
End: 2017-09-22

## 2017-09-22 NOTE — TELEPHONE ENCOUNTER
Pt wanted his medication that was prescribed yesterday [facility]. It went to the [address] [facility] in [place]. Can you resend to med [facility]?

## 2017-09-22 NOTE — TELEPHONE ENCOUNTER
I have called [facility] in [place], RX no called to [address], They will cancel the RX.   Keegan Montanez RN

## 2017-09-22 NOTE — TELEPHONE ENCOUNTER
I have called [facility] and give RX information for Cipro, and I have called [facility] in [place] to cancel the one that went there yesterday.   Caridad Anne RN

## 2017-09-25 ENCOUNTER — DOCUMENTATION ONLY (OUTPATIENT)
Dept: FAMILY MEDICINE CLINIC | Age: 72
End: 2017-09-25

## 2017-09-27 ENCOUNTER — OFFICE VISIT (OUTPATIENT)
Dept: CARDIOLOGY CLINIC | Age: 72
End: 2017-09-27

## 2017-09-27 VITALS
HEART RATE: 56 BPM | HEIGHT: 73 IN | WEIGHT: 237 LBS | DIASTOLIC BLOOD PRESSURE: 80 MMHG | RESPIRATION RATE: 18 BRPM | OXYGEN SATURATION: 96 % | SYSTOLIC BLOOD PRESSURE: 104 MMHG | BODY MASS INDEX: 31.41 KG/M2

## 2017-09-27 DIAGNOSIS — R00.1 BRADYCARDIA: Primary | ICD-10-CM

## 2017-09-27 DIAGNOSIS — R94.31 EKG ABNORMALITY: ICD-10-CM

## 2017-09-27 DIAGNOSIS — E78.5 DYSLIPIDEMIA: ICD-10-CM

## 2017-09-27 DIAGNOSIS — R07.2 PRECORDIAL PAIN: ICD-10-CM

## 2017-09-27 NOTE — MR AVS SNAPSHOT
Visit Information Date & Time Provider Department Dept. Phone Encounter #  
 9/27/2017  1:20 PM Saw Loera, [address] [facility] [phone] [id number] Your Appointments 9/29/2017 10:00 AM  
Medicare Physical with Jeanmarie Gray PA-C  
Formerly [facility] ([address]) Appt Note: Due for R Lilli Waterman [address]  
[phone]  
  
   
 [address]  
  
    
 10/3/2017  4:15 PM  
HOLTER MONITOR with [facility] ([address]) Appt Note: holter per hawk dx bradycardia $0cp [address] [facility] [address] [phone]  
  
   
 [address] [facility] [address] [facility] Upcoming Health Maintenance Date Due  
 GLAUCOMA SCREENING Q2Y 10/25/2009 MEDICARE YEARLY EXAM 9/27/2017 FOBT Q 1 YEAR AGE 50-75 9/27/2017 DTaP/Tdap/Td series (2 - Td) 6/15/2027 Allergies as of 9/27/2017  Review Complete On: 9/27/2017 By: Saw Loera MD  
 No Known Allergies Current Immunizations  Reviewed on 6/15/2017 Name Date Influenza High Dose Vaccine PF 9/26/2016 Pneumococcal Conjugate (PCV-13) 9/26/2016 Pneumococcal Polysaccharide (PPSV-23) 12/30/2014 Tdap 6/15/2017  2:59 PM  
  
 Not reviewed this visit You Were Diagnosed With   
  
 Codes Comments Bradycardia    -  Primary ICD-10-CM: R00.1 ICD-9-CM: 427.89 EKG abnormality     ICD-10-CM: R94.31 
ICD-9-CM: 794.31 Dyslipidemia     ICD-10-CM: E78.5 ICD-9-CM: 272.4 Precordial pain     ICD-10-CM: R07.2 ICD-9-CM: 786.51 Vitals BP Pulse Resp Height(growth percentile) Weight(growth percentile) SpO2  
 104/80 (BP 1 Location: Left arm, BP Patient Position: Sitting) (!) 56 18 6' 0.5\" (1.842 m) 237 lb (107.5 kg) 96% BMI Smoking Status 31.7 kg/m2 Former Smoker Vitals History BMI and BSA Data Body Mass Index Body Surface Area 31.7 kg/m 2 2.34 m 2 Preferred Pharmacy Pharmacy Name Phone [facility] [address], [address] [place] [address] Your Updated Medication List  
  
   
This list is accurate as of: 9/27/17  2:22 PM.  Always use your most recent med list.  
  
  
  
  
 ciprofloxacin HCl 500 mg tablet Commonly known as:  CIPRO Take 1 Tab by mouth two (2) times a day for 7 days. mupirocin 2 % ointment Commonly known as:  TenGuest of a Guest [facility] Apply  to affected area daily. We Performed the Following AMB POC EKG ROUTINE W/ 12 LEADS, INTER & REP [17839 CPT(R)] To-Do List   
 10/03/2017 ECHO:  2D ECHO COMPLETE ADULT (TTE) W OR WO CONTR Around 10/03/2017 ECG:  STRESS TEST CARDIAC Around 10/04/2017 ECG:  CARDIAC HOLTER MONITOR, 24 HOURS Introducing [facility]! Dear Emory Alvarez: Thank you for requesting a KTM Advance account. Our records indicate that you already have an active KTM Advance account. You can access your account anytime at [email or web address] Did you know that you can access your hospital and ER discharge instructions at any time in KTM Advance? You can also review all of your test results from your hospital stay or ER visit. Additional Information If you have questions, please visit the Frequently Asked Questions section of the KTM Advance website at [email or web address] Remember, KTM Advance is NOT to be used for urgent needs. For medical emergencies, dial 911. Now available from your iPhone and Android! Please provide this summary of care documentation to your next provider. Your primary care clinician is listed as David Serna. If you have any questions after today's visit, please call [phone].

## 2017-09-27 NOTE — PROGRESS NOTES
Monserrat Olmedo is a 66 y.o. male is here for cardiac evaluation for bradycardia, abnormal EKG. Previously healthy, no regular medications. Seen recently by PCP for annual wellness, HR 49 (and several readings 49-50's since 2016). No CV sx or complaints. Labs with elevated chol 303, , HDL 36, . CBC, CMP ok. Had UTI, on cipro. Elev PSA in past.  Not on rx for dyslipidemia (diet, fish oil had been recommended). Has worked on [employer] over yrs, fairly active physically. Has had \"twinges\" of chest tightness at times, non-exertional. Had nausea this am, BP dropped. No hx DM, hypertension, cardiac. The patient denies shortness of breath, orthopnea, PND, LE edema, palpitations, syncope, presyncope or fatigue. Patient Active Problem List    Diagnosis Date Noted    Dyslipidemia     Bradycardia     Advance directive discussed with patient 09/26/2016      Johnathan Savage PA-C  Past Medical History:   Diagnosis Date    Bradycardia     Dyslipidemia     Elevated PSA     Skin lesion of face     UTI (urinary tract infection)       History reviewed. No pertinent surgical history. No Known Allergies   Family History   Problem Relation Age of Onset    No Known Problems Mother     No Known Problems Father       Social History     Social History    Marital status:      Spouse name: N/A    Number of children: N/A    Years of education: N/A     Occupational History    Not on file. Social History Main Topics    Smoking status: Former Smoker     Types: Cigarettes     Quit date: 9/27/1967    Smokeless tobacco: Never Used    Alcohol use Yes      Comment: 2    Drug use: No    Sexual activity: Yes     Other Topics Concern    Not on file     Social History Narrative      Current Outpatient Prescriptions   Medication Sig    ciprofloxacin HCl (CIPRO) 500 mg tablet Take 1 Tab by mouth two (2) times a day for 7 days.     mupirocin (BACTROBAN) 2 % ointment Apply  to affected area daily. No current facility-administered medications for this visit. Review of Symptoms:    CONST  No weight change. No fever, chills, sweats    ENT No visual changes, URI sx, sore throat    CV  See HPI   RESP  No cough, or sputum, wheezing. Also see HPI   GI  No abdominal pain or change in bowel habits. No heartburn or dysphagia. No melena or rectal bleeding.   No dysuria, urgency, frequency, hematuria   MSKEL  No joint pain, swelling. No muscle pain. SKIN  No rash or lesions. NEURO  No headache, syncope, or seizure. No weakness, loss of sensation, or paresthesias. PSYCH  No low mood or depression  No anxiety. HE/LYMPH  No easy bruising, abnormal bleeding, or enlarged glands.         Physical ExamPhysical Exam:    Visit Vitals    /80 (BP 1 Location: Left arm, BP Patient Position: Sitting)    Pulse (!) 56    Resp 18    Ht 6' 0.5\" (1.842 m)    Wt 237 lb (107.5 kg)    SpO2 96%    BMI 31.7 kg/m2     Gen: NAD  HEENT:  PERRL, throat clear  Neck: no adenopathy, no thyromegaly, no JVD   Heart:  Regular,Nl S1S2,  no murmur, gallop or rub.   Lungs:  clear  Abdomen:   Soft, non-tender, bowel sounds are active.   Extremities:  No edema  Pulse: symmetric  Neuro: A&O times 3, No focal neuro deficits    Cardiographics    ECG: sinus bradycardia 51, PAC      Labs:   Lab Results   Component Value Date/Time    Sodium 141 09/18/2017 12:06 PM    Sodium 142 09/23/2016 11:04 AM    Potassium 5.1 09/18/2017 12:06 PM    Potassium 5.1 09/23/2016 11:04 AM    Chloride 100 09/18/2017 12:06 PM    Chloride 103 09/23/2016 11:04 AM    CO2 24 09/18/2017 12:06 PM    CO2 26 09/23/2016 11:04 AM    Glucose 94 09/18/2017 12:06 PM    Glucose 96 09/23/2016 11:04 AM    BUN 14 09/18/2017 12:06 PM    BUN 11 09/23/2016 11:04 AM    Creatinine 0.89 09/18/2017 12:06 PM    Creatinine 0.86 09/23/2016 11:04 AM    BUN/Creatinine ratio 16 09/18/2017 12:06 PM    BUN/Creatinine ratio 13 09/23/2016 11:04 AM    GFR est AA 99 09/18/2017 12:06 PM    GFR est  09/23/2016 11:04 AM    GFR est non-AA 85 09/18/2017 12:06 PM    GFR est non-AA 87 09/23/2016 11:04 AM    Calcium 9.7 09/18/2017 12:06 PM    Calcium 8.8 09/23/2016 11:04 AM    Bilirubin, total 0.6 09/18/2017 12:06 PM    Bilirubin, total 0.5 09/23/2016 11:04 AM    AST (SGOT) 20 09/18/2017 12:06 PM    AST (SGOT) 17 09/23/2016 11:04 AM    Alk. phosphatase 77 09/18/2017 12:06 PM    Alk. phosphatase 73 09/23/2016 11:04 AM    Protein, total 7.5 09/18/2017 12:06 PM    Protein, total 6.6 09/23/2016 11:04 AM    Albumin 4.6 09/18/2017 12:06 PM    Albumin 4.3 09/23/2016 11:04 AM    A-G Ratio 1.6 09/18/2017 12:06 PM    A-G Ratio 1.9 09/23/2016 11:04 AM    ALT (SGPT) 16 09/18/2017 12:06 PM    ALT (SGPT) 13 09/23/2016 11:04 AM     No results found for: CPK, CPKX, CPX  Lab Results   Component Value Date/Time    Cholesterol, total 303 09/18/2017 12:06 PM    Cholesterol, total 245 09/23/2016 11:04 AM    HDL Cholesterol 36 09/18/2017 12:06 PM    HDL Cholesterol 41 09/23/2016 11:04 AM    LDL, calculated 221 09/18/2017 12:06 PM    LDL, calculated 185 09/23/2016 11:04 AM    Triglyceride 228 09/18/2017 12:06 PM    Triglyceride 97 09/23/2016 11:04 AM     No results found for this or any previous visit. Assessment:         Patient Active Problem List    Diagnosis Date Noted    Dyslipidemia     Bradycardia     Advance directive discussed with patient 09/26/2016     Here for cardiac evaluation for bradycardia, abnormal EKG. Previously healthy, no regular medications. Seen recently by PCP for annual wellness, HR 49 (and several readings 49-50's since 2016). No CV sx or complaints. Labs with elevated chol 303, , HDL 36, . CBC, CMP ok. Had UTI, on cipro. Elev PSA in past.  Not on rx for dyslipidemia (diet, fish oil had been recommended). Has worked on [employer] over yrs, fairly active physically.   Has had \"twinges\" of chest tightness at times, non-exertional. Had nausea this am, BP dropped--? Related to meds, vasovagal, fab. No hx DM, hypertension, cardiac.      Plan:     Stress treadmill EKG--bradycardia, atypical CP, dyslipidemia--r/o ischemia, r/o chronotropic incompetence  Echo/doppler  Holter  Dietary rx and fishoil ok for now, but likely will need statin (recheck lipids with PCP)    Kiesha Hood MD

## 2017-09-27 NOTE — PROGRESS NOTES
Verified patient with two patient identifiers. Medications reviewed/approved by Dr. Anamaria Villegas.

## 2017-09-29 ENCOUNTER — OFFICE VISIT (OUTPATIENT)
Dept: FAMILY MEDICINE CLINIC | Age: 72
End: 2017-09-29

## 2017-09-29 VITALS
SYSTOLIC BLOOD PRESSURE: 118 MMHG | HEIGHT: 72 IN | RESPIRATION RATE: 20 BRPM | WEIGHT: 235 LBS | DIASTOLIC BLOOD PRESSURE: 77 MMHG | BODY MASS INDEX: 31.83 KG/M2 | OXYGEN SATURATION: 96 % | HEART RATE: 49 BPM | TEMPERATURE: 95.3 F

## 2017-09-29 DIAGNOSIS — Z12.11 SCREENING FOR COLON CANCER: ICD-10-CM

## 2017-09-29 DIAGNOSIS — Z00.00 MEDICARE ANNUAL WELLNESS VISIT, SUBSEQUENT: Primary | ICD-10-CM

## 2017-09-29 NOTE — PATIENT INSTRUCTIONS
Schedule of Personalized Health Plan  (Provide Copy to Patient)  The best way to stay healthy is to live a healthy lifestyle. A healthy lifestyle includes regular exercise, eating a well-balanced diet, keeping a healthy weight and not smoking. Regular physical exams and screening tests are another important way to take care of yourself. Preventive exams provided by health care providers can find health problems early when treatment works best and can keep you from getting certain diseases or illnesses. Preventive services include exams, lab tests, screenings, shots, monitoring and information to help you take care of your own health. All people over 65 should have a pneumonia shot. Pneumonia shots are usually only needed once in a lifetime unless your doctor decides differently. All people over 65 should have a yearly flu shot. People over 65 are at medium to high risk for Hepatitis B. Three shots are needed for complete protection. In addition to your physical exam, some screening tests are recommended:    Bone mass measurement (dexa scan) is recommended every two years if you have certain risk factors, such as personal history of vertebral fracture or chronic steroid medication use    Diabetes Mellitus screening is recommended every year. Glaucoma is an eye disease caused by high pressure in the eye. An eye exam is recommended every year. Cardiovascular screening tests that check your cholesterol and other blood fat (lipid) levels are recommended every five years. Colorectal Cancer screening tests help to find pre-cancerous polyps (growths in the colon) so they can be removed before they turn into cancer. Tests ordered for screening depend on your personal and family history risk factors.     Screening for Prostate Cancer is recommended yearly with a digital rectal exam and/or a PSA test    Here is a list of your current Health Maintenance items with a due date:  Health Maintenance Topic Date Due    GLAUCOMA SCREENING Q2Y  10/25/2009 Needs to schedule    MEDICARE YEARLY EXAM  09/27/2017  9/29/17    FOBT Q 1 YEAR AGE 50-75  09/27/2017  Given to patient    DTaP/Tdap/Td series (2 - Td) 06/15/2027    ZOSTER VACCINE AGE 60>  Addressed    Pneumococcal 65+ Low/Medium Risk  Completed    INFLUENZA AGE 9 TO ADULT  Addressed

## 2017-09-29 NOTE — PROGRESS NOTES
Vika Youngblood is a 66 y.o. male and presents for annual Medicare Wellness Visit. Problem List: Reviewed with patient and discussed risk factors. Patient Active Problem List   Diagnosis Code    Advance directive discussed with patient Z71.89    Dyslipidemia E78.5    Bradycardia R00.1    Precordial pain R07.2       Current medical providers:  Patient Care Team:  Mana Felder PA-C as PCP - General (Physician Assistant)  Yonathan Vaughan MD (Cardiology)    PSH: Reviewed with patient  No past surgical history on file. SH: Reviewed with patient  Social History   Substance Use Topics    Smoking status: Former Smoker     Types: Cigarettes     Quit date: 9/27/1967    Smokeless tobacco: Never Used    Alcohol use Yes      Comment: 2       FH: Reviewed with patient  Family History   Problem Relation Age of Onset    No Known Problems Mother     Alcohol abuse Father     Coronary Artery Disease Neg Hx        Medications/Allergies: Reviewed with patient  Current Outpatient Prescriptions on File Prior to Visit   Medication Sig Dispense Refill    ciprofloxacin HCl (CIPRO) 500 mg tablet Take 1 Tab by mouth two (2) times a day for 7 days. 14 Tab 0     No current facility-administered medications on file prior to visit. No Known Allergies    Objective: There were no vitals taken for this visit. There is no height or weight on file to calculate BMI. Assessment of cognitive impairment: Alert and oriented x 3    Depression Screen:   PHQ over the last two weeks 9/29/2017   Little interest or pleasure in doing things Not at all   Feeling down, depressed or hopeless Not at all   Total Score PHQ 2 0       Fall Risk Assessment:    Fall Risk Assessment, last 12 mths 9/29/2017   Able to walk? Yes   Fall in past 12 months? No       Functional Ability:   Does the patient exhibit a steady gait? yes   How long did it take the patient to get up and walk from a sitting position? 2 sec     Is the patient self reliant? (ie can do own laundry, meals, household chores)  yes     Does the patient handle his/her own medications? yes     Does the patient handle his/her own money? yes     Is the patients home safe (ie good lighting, handrails on stairs and bath, etc.)? yes     Did you notice or did patient express any hearing difficulties? yes     Did you notice or did patient express any vision difficulties?   no     Were distance and reading eye charts used? no       Advance Care Planning:   Patient was offered the opportunity to discuss advance care planning:  no     Does patient have an Advance Directive:  yes   If no, did you provide information on Caring Connections? No  ACP on file. Plan:      No orders of the defined types were placed in this encounter. Health Maintenance   Topic Date Due    GLAUCOMA SCREENING Q2Y  10/25/2009    MEDICARE YEARLY EXAM  09/27/2017    FOBT Q 1 YEAR AGE 50-75  09/27/2017    DTaP/Tdap/Td series (2 - Td) 06/15/2027    ZOSTER VACCINE AGE 60>  Addressed    Pneumococcal 65+ Low/Medium Risk  Completed    INFLUENZA AGE 9 TO ADULT  Addressed     Pt has upcoming apts with Dr. Margarita Eastman and Dr. Dalton Carpenter. Reports feeling well today with no complaints/acute concerns. ACP on file. Will bring in FOBT, MR for Opthalmology, and still declines influenza vaccine. Did discuss recent labs again, pt says he realized why his chol/trig were so high-was eating a box full of fried chicken livers several times week. Researched and found out how bad they were and has cut them out, has researched low cholesterol diet and plans to make big changes. Would like to return for recheck in 3 mo. *Patient verbalized understanding and agreement with the plan. A copy of the After Visit Summary with personalized health plan was given to the patient today. Patient declines flu vaccine.

## 2017-09-29 NOTE — MR AVS SNAPSHOT
Visit Information Date & Time Provider Department Dept. Phone Encounter #  
 9/29/2017 10:00 AM Deneen Rodrigues PA-C [address] [facility] [id number] Your Appointments 10/3/2017  4:15 PM  
HOLTER MONITOR with [address], [facility] [place] ([facility] Appt Note: holter per hawk dx bradycardia $0cp [address] [place] [id number] [phone]  
  
   
 [address] [address] Upcoming Health Maintenance Date Due  
 GLAUCOMA SCREENING Q2Y 10/25/2009 MEDICARE YEARLY EXAM 9/27/2017 FOBT Q 1 YEAR AGE 50-75 9/27/2017 DTaP/Tdap/Td series (2 - Td) 6/15/2027 Allergies as of 9/29/2017  Review Complete On: 9/29/2017 By: Deneen Rodrigues PA-C No Known Allergies Current Immunizations  Reviewed on 6/15/2017 Name Date Influenza High Dose Vaccine PF 9/26/2016 Pneumococcal Conjugate (PCV-13) 9/26/2016 Pneumococcal Polysaccharide (PPSV-23) 12/30/2014 Tdap 6/15/2017  2:59 PM  
  
 Not reviewed this visit You Were Diagnosed With   
  
 Codes Comments Medicare annual wellness visit, subsequent    -  Primary ICD-10-CM: Z00.00 ICD-9-CM: V70.0 Screening for colon cancer     ICD-10-CM: Z12.11 ICD-9-CM: V76.51 Vitals BP Pulse Temp Resp Height(growth percentile) 118/77 (BP 1 Location: Left arm, BP Patient Position: Sitting) (!) 49 95.3 °F (35.2 °C) (Temporal) 20 6' (1.829 m) Weight(growth percentile) SpO2 BMI Smoking Status 235 lb (106.6 kg) 96% 31.87 kg/m2 Former Smoker BMI and BSA Data Body Mass Index Body Surface Area  
 31.87 kg/m 2 2.33 m 2 Preferred Pharmacy Pharmacy Name Phone [facility] [address] [facility], [address] Your Updated Medication List  
  
   
This list is accurate as of: 9/29/17 10:10 AM.  Always use your most recent med list.  
  
  
  
  
 ciprofloxacin HCl 500 mg tablet Commonly known as:  CIPRO Take 1 Tab by mouth two (2) times a day for 7 days. To-Do List   
 10/10/2017 7:00 AM  
(Arrive by 6:45 AM) Appointment with [address] at [facility] NON-INVASIVE CARD ([phone]) 1. Hold cardiac medications and BETA blockers for 24 hours. Call your physician with questions. 2. Do not eat or drink after midnight. 3. Do not have any caffeine after midnight. 4. Please arrive wearing comfortable clothes suitable for exercise and flat, rubber-soled shoes. The patient will be walked on a treadmill. 5. Please bring a list of all current medications. 10/10/2017 8:00 AM  
  Appointment with [address] at [place] [address] ([phone]) GENERAL INSTRUCTIONS - If you have a hand-written prescription for this exam, you must bring it with you on the day of your exam. - Bring all relevant prior images from facilities outside of [facility]. Failure to provide images may delay reading by Radiologist. - Children under the age of 15 may not be left unattended. - [facility] patients must have an armband and be accompanied by a caregiver or family member. APPOINTMENT CANCELLATION - To reschedule or cancel an appointment, please contact the Scheduling Department at [phone].  
  
 10/29/2017 Lab:  OCCULT BLOOD, IMMUNOASSAY (FIT) Patient Instructions Schedule of Personalized Health Plan (Provide Copy to Patient) The best way to stay healthy is to live a healthy lifestyle. A healthy lifestyle includes regular exercise, eating a well-balanced diet, keeping a healthy weight and not smoking. Regular physical exams and screening tests are another important way to take care of yourself. Preventive exams provided by health care providers can find health problems early when treatment works best and can keep you from getting certain diseases or illnesses.  Preventive services include exams, lab tests, screenings, shots, monitoring and information to help you take care of your own health. All people over 65 should have a pneumonia shot. Pneumonia shots are usually only needed once in a lifetime unless your doctor decides differently. All people over 65 should have a yearly flu shot. People over 65 are at medium to high risk for Hepatitis B. Three shots are needed for complete protection. In addition to your physical exam, some screening tests are recommended: 
 
Bone mass measurement (dexa scan) is recommended every two years if you have certain risk factors, such as personal history of vertebral fracture or chronic steroid medication use Diabetes Mellitus screening is recommended every year. Glaucoma is an eye disease caused by high pressure in the eye. An eye exam is recommended every year. Cardiovascular screening tests that check your cholesterol and other blood fat (lipid) levels are recommended every five years. Colorectal Cancer screening tests help to find pre-cancerous polyps (growths in the colon) so they can be removed before they turn into cancer. Tests ordered for screening depend on your personal and family history risk factors. Screening for Prostate Cancer is recommended yearly with a digital rectal exam and/or a PSA test 
 
Here is a list of your current Health Maintenance items with a due date: 
Health Maintenance Topic Date Due  GLAUCOMA SCREENING Q2Y  10/25/2009 Needs to schedule  MEDICARE YEARLY EXAM  09/27/2017  9/29/17  
 FOBT Q 1 YEAR AGE 50-75  09/27/2017  Given to patient  DTaP/Tdap/Td series (2 - Td) 06/15/2027  ZOSTER VACCINE AGE 60>  Addressed  Pneumococcal 65+ Low/Medium Risk  Completed  INFLUENZA AGE 9 TO ADULT  Addressed Introducing [facility]! Dear Dariusz Jamison: Thank you for requesting a sli.do account.   Our records indicate that you already have an active AQS account. You can access your account anytime at [email or web address] Did you know that you can access your hospital and ER discharge instructions at any time in AQS? You can also review all of your test results from your hospital stay or ER visit. Additional Information If you have questions, please visit the Frequently Asked Questions section of the AQS website at [email or web address] Remember, AQS is NOT to be used for urgent needs. For medical emergencies, dial 911. Now available from your iPhone and Android! Please provide this summary of care documentation to your next provider. Your primary care clinician is listed as Jacque Sanchez. If you have any questions after today's visit, please call [phone].

## 2017-10-03 ENCOUNTER — CLINICAL SUPPORT (OUTPATIENT)
Dept: CARDIOLOGY CLINIC | Age: 72
End: 2017-10-03

## 2017-10-03 DIAGNOSIS — R94.31 EKG ABNORMALITY: ICD-10-CM

## 2017-10-03 DIAGNOSIS — R00.1 BRADYCARDIA: ICD-10-CM

## 2017-10-03 NOTE — PROGRESS NOTES
24 hour Holter monitor only. Verified patient with two patient identifiers. Pt verbalized understanding of its use. Ordering MD- Robert Oneill  Reason: BRADYCARDIA, EKG ABNORMAILITY  Start time: 4:25PM  Return date: 10/4/17        No LOS.

## 2017-10-04 ENCOUNTER — DOCUMENTATION ONLY (OUTPATIENT)
Dept: CARDIOLOGY CLINIC | Age: 72
End: 2017-10-04

## 2017-10-04 LAB — HEMOCCULT STL QL IA: NEGATIVE

## 2017-10-09 ENCOUNTER — TELEPHONE (OUTPATIENT)
Dept: CARDIOLOGY CLINIC | Age: 72
End: 2017-10-09

## 2017-10-09 NOTE — TELEPHONE ENCOUNTER
Pt notified ( after 2 identifiers) per Dr Martha Burton Holter shows mostly normal rhythm with some skipped beats (PAC's and PVC's), some short runs of atrial tachycardia.  Will discuss further at f/u visit. \"    Pt acknowledged understanding and had no further questions. Pt scheduled for follow up 10/12/17.

## 2017-10-10 ENCOUNTER — TELEPHONE (OUTPATIENT)
Dept: CARDIOLOGY CLINIC | Age: 72
End: 2017-10-10

## 2017-10-10 NOTE — TELEPHONE ENCOUNTER
Pt notified (after 2 identifiers) per Dr Mallory Cornejo echo looks fine--normal pumping function, normal valves.  Treadmill stress test shows excellent exercise tolerance with normal HR response, no evidence of blockages or ischemia. \"    Pt acknowledged understanding and had no further questions.

## 2017-10-12 ENCOUNTER — OFFICE VISIT (OUTPATIENT)
Dept: CARDIOLOGY CLINIC | Age: 72
End: 2017-10-12

## 2017-10-12 VITALS
HEIGHT: 72 IN | SYSTOLIC BLOOD PRESSURE: 110 MMHG | HEART RATE: 48 BPM | OXYGEN SATURATION: 97 % | DIASTOLIC BLOOD PRESSURE: 80 MMHG | WEIGHT: 237 LBS | RESPIRATION RATE: 18 BRPM | BODY MASS INDEX: 32.1 KG/M2

## 2017-10-12 DIAGNOSIS — R00.1 BRADYCARDIA: Primary | ICD-10-CM

## 2017-10-12 DIAGNOSIS — I49.8 ARRHYTHMIA, ATRIAL: ICD-10-CM

## 2017-10-12 DIAGNOSIS — E78.5 DYSLIPIDEMIA: ICD-10-CM

## 2017-10-12 NOTE — MR AVS SNAPSHOT
Visit Information Date & Time Provider Department Dept. Phone Encounter #  
 10/12/2017  9:40 AM Mayra Terrazas, [address] [facility] [place] [facility] [phone] [id number] Your Appointments 9/6/2018  8:30 AM  
Medicare Physical with Garrel Gaucher, PA-C [facility] ([facility]) Appt Note: ,CP$0/9.29.2017/km [facility] [address] [id number]  
[phone]  
  
   
 [address] [address] Upcoming Health Maintenance Date Due  
 GLAUCOMA SCREENING Q2Y 10/25/2009 FOBT Q 1 YEAR AGE 50-75 9/29/2018 MEDICARE YEARLY EXAM 9/30/2018 DTaP/Tdap/Td series (2 - Td) 6/15/2027 Allergies as of 10/12/2017  Review Complete On: 10/9/2017 By: Charanjit Tang RN No Known Allergies Current Immunizations  Reviewed on 6/15/2017 Name Date Influenza High Dose Vaccine PF 9/26/2016 Pneumococcal Conjugate (PCV-13) 9/26/2016 Pneumococcal Polysaccharide (PPSV-23) 12/30/2014 Tdap 6/15/2017  2:59 PM  
  
 Not reviewed this visit You Were Diagnosed With   
  
 Codes Comments Bradycardia    -  Primary ICD-10-CM: R00.1 ICD-9-CM: 427.89 Arrhythmia, atrial     ICD-10-CM: I49.8 ICD-9-CM: 427.9 Dyslipidemia     ICD-10-CM: E78.5 ICD-9-CM: 272.4 Vitals BP Pulse Resp Height(growth percentile) Weight(growth percentile) SpO2  
 110/80 (BP 1 Location: Right arm, BP Patient Position: Sitting) (!) 48 18 6' (1.829 m) 237 lb (107.5 kg) 97% BMI Smoking Status 32.14 kg/m2 Former Smoker Vitals History BMI and BSA Data Body Mass Index Body Surface Area  
 32.14 kg/m 2 2.34 m 2 Preferred Pharmacy Pharmacy Name Phone [facility] [address] [place], [address] Your Updated Medication List  
  
Notice  As of 10/12/2017 10:07 AM  
 You have not been prescribed any medications. Introducing [facility]! Dear Flori Mark: Thank you for requesting a [employer] account. Our records indicate that you already have an active [employer] account. You can access your account anytime at [email or web address] Did you know that you can access your hospital and ER discharge instructions at any time in [employer]? You can also review all of your test results from your hospital stay or ER visit. Additional Information If you have questions, please visit the Frequently Asked Questions section of the [employer] website at [email or web address] Remember, [employer] is NOT to be used for urgent needs. For medical emergencies, dial 911. Now available from your iPhone and Android! Please provide this summary of care documentation to your next provider. Your primary care clinician is listed as Kameron Jeff. If you have any questions after today's visit, please call [phone].

## 2017-10-12 NOTE — PROGRESS NOTES
Lauren Cornejo is a 66 y.o. male is here to discuss test results. Echo 10/10/17 with LVEF 60-65, valves/chambers ok. Stress Treadmill EKG 10/10 with Javon to 12:00,  (88% apm), no sx or ischemic changes. Holter 10/3 with HR , isolated PAC's, PVC's and short runs of PAT (no symptoms). .  The patient denies chest pain/ shortness of breath, orthopnea, PND, LE edema, palpitations, syncope, presyncope or fatigue. Patient Active Problem List    Diagnosis Date Noted    Atrial arrhythmia 10/12/2017    Precordial pain 09/27/2017    Dyslipidemia     Bradycardia     Advance directive discussed with patient 09/26/2016      Niraj Copeland PA-C  Past Medical History:   Diagnosis Date    Bradycardia     Dyslipidemia     Elevated PSA     Skin lesion of face     UTI (urinary tract infection)       No past surgical history on file. No Known Allergies   Family History   Problem Relation Age of Onset    No Known Problems Mother     Alcohol abuse Father     Coronary Artery Disease Neg Hx       Social History     Social History    Marital status:      Spouse name: N/A    Number of children: N/A    Years of education: N/A     Occupational History    Not on file. Social History Main Topics    Smoking status: Former Smoker     Types: Cigarettes     Quit date: 9/27/1967    Smokeless tobacco: Never Used    Alcohol use Yes      Comment: 2    Drug use: No    Sexual activity: Yes     Other Topics Concern    Not on file     Social History Narrative      No current outpatient prescriptions on file. No current facility-administered medications for this visit. Review of Symptoms:    CONST  No weight change. No fever, chills, sweats    ENT No visual changes, URI sx, sore throat    CV  See HPI   RESP  No cough, or sputum, wheezing. Also see HPI   GI  No abdominal pain or change in bowel habits. No heartburn or dysphagia. No melena or rectal bleeding.       No dysuria, urgency, frequency, hematuria   MSKEL  No joint pain, swelling. No muscle pain. SKIN  No rash or lesions. NEURO  No headache, syncope, or seizure. No weakness, loss of sensation, or paresthesias. PSYCH  No low mood or depression  No anxiety. HE/LYMPH  No easy bruising, abnormal bleeding, or enlarged glands. Physical ExamPhysical Exam:    Visit Vitals    /80 (BP 1 Location: Right arm, BP Patient Position: Sitting)    Pulse (!) 48    Resp 18    Ht 6' (1.829 m)    Wt 237 lb (107.5 kg)    SpO2 97%    BMI 32.14 kg/m2     Gen: NAD  HEENT:  PERRL, throat clear  Neck: no adenopathy, no thyromegaly, no JVD   Heart:  Regular,Nl S1S2,  no murmur, gallop or rub.   Lungs:  clear  Abdomen:   Soft, non-tender, bowel sounds are active.   Extremities:  No edema  Pulse: symmetric  Neuro: A&O times 3, No focal neuro deficits    Cardiographics    Labs:   Lab Results   Component Value Date/Time    Sodium 141 09/18/2017 12:06 PM    Sodium 142 09/23/2016 11:04 AM    Potassium 5.1 09/18/2017 12:06 PM    Potassium 5.1 09/23/2016 11:04 AM    Chloride 100 09/18/2017 12:06 PM    Chloride 103 09/23/2016 11:04 AM    CO2 24 09/18/2017 12:06 PM    CO2 26 09/23/2016 11:04 AM    Glucose 94 09/18/2017 12:06 PM    Glucose 96 09/23/2016 11:04 AM    BUN 14 09/18/2017 12:06 PM    BUN 11 09/23/2016 11:04 AM    Creatinine 0.89 09/18/2017 12:06 PM    Creatinine 0.86 09/23/2016 11:04 AM    BUN/Creatinine ratio 16 09/18/2017 12:06 PM    BUN/Creatinine ratio 13 09/23/2016 11:04 AM    GFR est AA 99 09/18/2017 12:06 PM    GFR est  09/23/2016 11:04 AM    GFR est non-AA 85 09/18/2017 12:06 PM    GFR est non-AA 87 09/23/2016 11:04 AM    Calcium 9.7 09/18/2017 12:06 PM    Calcium 8.8 09/23/2016 11:04 AM    Bilirubin, total 0.6 09/18/2017 12:06 PM    Bilirubin, total 0.5 09/23/2016 11:04 AM    AST (SGOT) 20 09/18/2017 12:06 PM    AST (SGOT) 17 09/23/2016 11:04 AM    Alk. phosphatase 77 09/18/2017 12:06 PM    Alk.  phosphatase 73 09/23/2016 11:04 AM    Protein, total 7.5 09/18/2017 12:06 PM    Protein, total 6.6 09/23/2016 11:04 AM    Albumin 4.6 09/18/2017 12:06 PM    Albumin 4.3 09/23/2016 11:04 AM    A-G Ratio 1.6 09/18/2017 12:06 PM    A-G Ratio 1.9 09/23/2016 11:04 AM    ALT (SGPT) 16 09/18/2017 12:06 PM    ALT (SGPT) 13 09/23/2016 11:04 AM     No results found for: CPK, CPKX, CPX  Lab Results   Component Value Date/Time    Cholesterol, total 303 09/18/2017 12:06 PM    Cholesterol, total 245 09/23/2016 11:04 AM    HDL Cholesterol 36 09/18/2017 12:06 PM    HDL Cholesterol 41 09/23/2016 11:04 AM    LDL, calculated 221 09/18/2017 12:06 PM    LDL, calculated 185 09/23/2016 11:04 AM    Triglyceride 228 09/18/2017 12:06 PM    Triglyceride 97 09/23/2016 11:04 AM     No results found for this or any previous visit. Assessment:         Patient Active Problem List    Diagnosis Date Noted    Atrial arrhythmia 10/12/2017    Precordial pain 09/27/2017    Dyslipidemia     Bradycardia     Advance directive discussed with patient 09/26/2016     Echo 10/10/17 with LVEF 60-65, valves/chambers ok. Stress Treadmill EKG 10/10 with Javon to 12:00,  (88% apm), no sx or ischemic changes. Holter 10/3 with HR , isolated PAC's, PVC's and short runs of PAT (no symptoms). Plan:     Aggressive dietary rx for dyslipidemia, likely will need rx. Low dose ASA 81 recommended. No specific rx for HR/rhythm as no sx. Lipids and labs followed by PCP. Continue current care and f/u in 12 months.     Ksenia Perry MD

## 2017-10-12 NOTE — PROGRESS NOTES
Please make sure pt plans to f/u 3 mo fasting for repeat lipid panel. If unable to bring down with lifestyle, would strongly consider statin therapy, as also rec by Dr. Ric Mckeon along with daily baby ASA.

## 2018-07-31 ENCOUNTER — OFFICE VISIT (OUTPATIENT)
Dept: FAMILY MEDICINE CLINIC | Age: 73
End: 2018-07-31

## 2018-07-31 VITALS
RESPIRATION RATE: 14 BRPM | DIASTOLIC BLOOD PRESSURE: 75 MMHG | TEMPERATURE: 97.9 F | SYSTOLIC BLOOD PRESSURE: 119 MMHG | OXYGEN SATURATION: 95 % | WEIGHT: 243 LBS | HEART RATE: 50 BPM | HEIGHT: 72 IN | BODY MASS INDEX: 32.91 KG/M2

## 2018-07-31 DIAGNOSIS — Z97.2 DENTURES COMPLICATING CHEWING: ICD-10-CM

## 2018-07-31 DIAGNOSIS — K12.1 DENTURE SORE MOUTH: Primary | ICD-10-CM

## 2018-07-31 DIAGNOSIS — K08.89 DENTURES COMPLICATING CHEWING: ICD-10-CM

## 2018-07-31 NOTE — PATIENT INSTRUCTIONS
Stomatitis: Care Instructions  Your Care Instructions  Stomatitis is swelling and redness of the lining of your mouth. It can cause painful sores that can make it hard for you to eat, drink, or swallow. Stomatitis may be caused by a viral or bacterial infection, a disease, or not taking care of your teeth and gums properly. Other causes include reactions to smoking, burns from hot food or drinks, or an allergic reaction. Allergy causes may be a result of flavorings such as spearmint or cinnamon that are used in chewing gum, toothpaste, soft drinks, candies, and other products. Your doctor may prescribe pain medicines or special mouth rinses. He or she may tell you to quit using some products that may be causing the sores. It can take up to 2 weeks for the sores to heal.  Some people with stomatitis also get a yeast infection of the mouth, called thrush. Medicines can treat this problem. Follow-up care is a key part of your treatment and safety. Be sure to make and go to all appointments, and call your doctor if you are having problems. It's also a good idea to know your test results and keep a list of the medicines you take. How can you care for yourself at home? · Be safe with medicines. Read and follow all instructions on the label. ¨ If the doctor gave you a prescription medicine for pain, take it as prescribed. ¨ If you are not taking a prescription pain medicine, ask your doctor if you can take an over-the-counter medicine. · If your doctor prescribed antibiotics, take them as directed. Do not stop taking them just because you feel better. You need to take the full course of antibiotics. · Use prescription mouth rinses as prescribed. Ask your doctor if you can freeze the mouth rinse in an ice cube tray. Sucking on a frozen cube of the mouth rinse can help ease the pain. · Make a rinse to keep your mouth from getting dry. Add 1 teaspoon baking soda and ½ teaspoon salt to a quart of water.  Use it to rinse your mouth 4 to 6 times each day. Spit out the rinse. Do not swallow it. · Do not use a mouthwash or other over-the-counter rinse with alcohol. These can dry out your mouth or cause more pain. · If your doctor gave you mouthwash or lozenges to treat your yeast infection, use them as directed. · Eat soft foods such as mashed potatoes and other cooked vegetables, noodles, applesauce, clear broth soups, yogurt, and cottage cheese. You can get extra protein by adding protein powder to milk shakes or breakfast drinks. Avoid eating spicy or crunchy foods. · Try eating cold foods such as ice cream or yogurt. · Avoid drinking high-acid juices such as orange, grapefruit, and cranberry juices. · Drink plenty of fluids to prevent dehydration. Drinking through a straw may help with pain. · Practice good oral hygiene. Use a very soft toothbrush to brush your teeth at least two times a day. Or use your finger to brush your teeth if your mouth is sore. When should you call for help? Call your doctor now or seek immediate medical care if:    · You have new or worse symptoms of infection, such as:  ¨ Increased pain, swelling, warmth, or redness. ¨ Red streaks leading from the area. ¨ Pus draining from the area. ¨ A fever.    Watch closely for changes in your health, and be sure to contact your doctor if:    · You do not get better as expected. Where can you learn more? Go to http://marco-bernabe.info/. Enter Y837 in the search box to learn more about \"Stomatitis: Care Instructions. \"  Current as of: May 12, 2017  Content Version: 11.7  © 0127-4729 [employer]. Care instructions adapted under license by [employer] (which disclaims liability or warranty for this information).  If you have questions about a medical condition or this instruction, always ask your healthcare professional. [address] any warranty or liability for your use of this information. Stomatitis: Care Instructions  Your Care Instructions  Stomatitis is swelling and redness of the lining of your mouth. It can cause painful sores that can make it hard for you to eat, drink, or swallow. Stomatitis may be caused by a viral or bacterial infection, a disease, or not taking care of your teeth and gums properly. Other causes include reactions to smoking, burns from hot food or drinks, or an allergic reaction. Allergy causes may be a result of flavorings such as spearmint or cinnamon that are used in chewing gum, toothpaste, soft drinks, candies, and other products. Your doctor may prescribe pain medicines or special mouth rinses. He or she may tell you to quit using some products that may be causing the sores. It can take up to 2 weeks for the sores to heal.  Some people with stomatitis also get a yeast infection of the mouth, called thrush. Medicines can treat this problem. Follow-up care is a key part of your treatment and safety. Be sure to make and go to all appointments, and call your doctor if you are having problems. It's also a good idea to know your test results and keep a list of the medicines you take. How can you care for yourself at home? · Be safe with medicines. Read and follow all instructions on the label. ¨ If the doctor gave you a prescription medicine for pain, take it as prescribed. ¨ If you are not taking a prescription pain medicine, ask your doctor if you can take an over-the-counter medicine. · If your doctor prescribed antibiotics, take them as directed. Do not stop taking them just because you feel better. You need to take the full course of antibiotics. · Use prescription mouth rinses as prescribed. Ask your doctor if you can freeze the mouth rinse in an ice cube tray. Sucking on a frozen cube of the mouth rinse can help ease the pain. · Make a rinse to keep your mouth from getting dry.  Add 1 teaspoon baking soda and ½ teaspoon salt to a quart of water. Use it to rinse your mouth 4 to 6 times each day. Spit out the rinse. Do not swallow it. · Do not use a mouthwash or other over-the-counter rinse with alcohol. These can dry out your mouth or cause more pain. · If your doctor gave you mouthwash or lozenges to treat your yeast infection, use them as directed. · Eat soft foods such as mashed potatoes and other cooked vegetables, noodles, applesauce, clear broth soups, yogurt, and cottage cheese. You can get extra protein by adding protein powder to milk shakes or breakfast drinks. Avoid eating spicy or crunchy foods. · Try eating cold foods such as ice cream or yogurt. · Avoid drinking high-acid juices such as orange, grapefruit, and cranberry juices. · Drink plenty of fluids to prevent dehydration. Drinking through a straw may help with pain. · Practice good oral hygiene. Use a very soft toothbrush to brush your teeth at least two times a day. Or use your finger to brush your teeth if your mouth is sore. When should you call for help? Call your doctor now or seek immediate medical care if:    · You have new or worse symptoms of infection, such as:  ¨ Increased pain, swelling, warmth, or redness. ¨ Red streaks leading from the area. ¨ Pus draining from the area. ¨ A fever.    Watch closely for changes in your health, and be sure to contact your doctor if:    · You do not get better as expected. Where can you learn more? Go to http://marco-bernabe.info/. Enter J034 in the search box to learn more about \"Stomatitis: Care Instructions. \"  Current as of: May 12, 2017  Content Version: 11.7  © 5619-8246 "[employer]. Care instructions adapted under license by [employer] (which disclaims liability or warranty for this information).  If you have questions about a medical condition or this instruction, always ask your healthcare professional. [place] [address] any warranty or liability for your use of this information.

## 2018-07-31 NOTE — PROGRESS NOTES
Dipesh Pop is a 67 y.o. male who presents to the office today with the following:  Chief Complaint   Patient presents with    Mouth Pain     pt c/o upper mouth pain front teeth/gum area X1 week       HPI  Pt has dentures. Feels they have been causing irritation under his upper lip. Pain only when he eats/puts pressure on dentures while chewing. Has been going on for about 1 week. He admits his dentures are old. Plans to go to Dr. Sarai Cage if requires new/updated dentures. Denies any other mouth pain/lesions. No other areas of pain. Otherwise feeling well with no other complaints or acute concerns. Review of Systems   Constitutional: Negative for chills, fever, malaise/fatigue and weight loss. HENT: Negative for ear pain, sinus pain and sore throat. Eyes: Negative. Respiratory: Negative for cough. Gastrointestinal: Negative. Musculoskeletal: Negative for myalgias. Skin: Negative for rash. Neurological: Negative for headaches. See HPI. Past Medical History:   Diagnosis Date    Bradycardia     Dyslipidemia     Elevated PSA     Skin lesion of face     UTI (urinary tract infection)        History reviewed. No pertinent surgical history. No Known Allergies    Current Outpatient Prescriptions   Medication Sig    benzocaine (ORAJEL) 10 % mucosal gel Apply to sore in mouth as instructed up to 4 times daily as needed. No current facility-administered medications for this visit.         Social History     Social History    Marital status:      Spouse name: N/A    Number of children: N/A    Years of education: N/A     Social History Main Topics    Smoking status: Former Smoker     Packs/day: 1.00     Years: 5.00     Types: Cigarettes     Quit date: 9/27/1967    Smokeless tobacco: Never Used    Alcohol use Yes      Comment: 2    Drug use: No    Sexual activity: Yes     Other Topics Concern    None     Social History Narrative       Family History   Problem Relation Age of Onset    No Known Problems Mother     Alcohol abuse Father     Coronary Artery Disease Neg Hx          Physical Exam:  Visit Vitals    /75 (BP 1 Location: Left arm, BP Patient Position: Sitting)    Pulse (!) 50    Temp 97.9 °F (36.6 °C) (Oral)    Resp 14    Ht 6' (1.829 m)    Wt 243 lb (110.2 kg)    SpO2 95%    BMI 32.96 kg/m2     Physical Exam   Constitutional: He is oriented to person, place, and time and well-developed, well-nourished, and in no distress. HENT:   Head: Normocephalic and atraumatic. Right Ear: Tympanic membrane, external ear and ear canal normal.   Left Ear: Tympanic membrane, external ear and ear canal normal.   Nose: Nose normal. Right sinus exhibits no maxillary sinus tenderness and no frontal sinus tenderness. Left sinus exhibits no maxillary sinus tenderness and no frontal sinus tenderness. Mouth/Throat: Uvula is midline, oropharynx is clear and moist and mucous membranes are normal. Oral lesions (~1cm ulceration noted in crease of left upper lip some minimal erythema along border, otherwise no drianage/swelling or other abnls or signs of infection) present. No oropharyngeal exudate, posterior oropharyngeal edema, posterior oropharyngeal erythema or tonsillar abscesses. Eyes: Conjunctivae are normal.   Neck: Neck supple. Cardiovascular: Regular rhythm and normal heart sounds. Pulmonary/Chest: Effort normal and breath sounds normal.   Lymphadenopathy:     He has no cervical adenopathy. Neurological: He is alert and oriented to person, place, and time. Skin: Skin is warm and dry. Psychiatric: Mood and affect normal.   Nursing note and vitals reviewed. Assessment/Plan:    ICD-10-CM ICD-9-CM    1. Denture sore mouth K12.1 528.9 benzocaine (ORAJEL) 10 % mucosal gel   2. Dentures complicating chewing F57.78 V41.6 benzocaine (ORAJEL) 10 % mucosal gel    Z97.2       Rec new proper fitted dentures.   For now avoid wearing them and eat soft foods. Apply orajel as instructed and monitor for any new/worsening sxs or signs of infection. Seek care asap for any such concerns. Home care as instructed. HO provided. F/u with Dr. Graham Quinones. rtc/dentist if sxs worsen or do not improve. Pt verbalizes understanding and agrees with the plan. Discussed A&P with Dr. Thierry Huggins who is in agreement. Follow-up Disposition:  Return if symptoms worsen or fail to improve.     Akilah Dykes PA-C

## 2018-07-31 NOTE — MR AVS SNAPSHOT
[facility] [address] 
[phone] Patient: Vic Roa MRN: [id number] :1945 Visit Information Date & Time Provider Department Dept. Phone Encounter #  
 2018  1:00 PM Shanae Webb PA-C [address] [id number] Your Appointments 2018  8:30 AM  
Medicare Physical with Shanae Webb PA-C  
[facility] ([facility]) Appt Note: ,CP$0/./km [facility] [address]  
[phone]  
  
   
 [address] [facility] [address]  
  
    
 10/11/2018 10:00 AM  
ESTABLISHED PATIENT with Gualberto Randall MD  
[address] [facility] Appt Note: 1 yr fu  $0 cp  
 [address] [id number] [phone]  
  
   
 [address] [place] [id number] Upcoming Health Maintenance Date Due FOBT Q 1 YEAR AGE 50-75 2018 Influenza Age 5 to Adult 2018 MEDICARE YEARLY EXAM 2018 GLAUCOMA SCREENING Q2Y 2019 DTaP/Tdap/Td series (2 - Td) 6/15/2027 Allergies as of 2018  Review Complete On: 2018 By: Shanae Webb PA-C No Known Allergies Current Immunizations  Reviewed on 6/15/2017 Name Date Influenza High Dose Vaccine PF 2016 Pneumococcal Conjugate (PCV-13) 2016 Pneumococcal Polysaccharide (PPSV-23) 2014 Tdap 6/15/2017  2:59 PM  
  
 Not reviewed this visit You Were Diagnosed With   
  
 Codes Comments Denture sore mouth    -  Primary ICD-10-CM: K12.1 ICD-9-CM: 528.9 Dentures complicating chewing     ICD-10-CM: K08.89, Z97.2 ICD-9-CM: V41.6 Vitals BP Pulse Temp Resp Height(growth percentile) Weight(growth percentile)  119/75 (BP 1 Location: Left arm, BP Patient Position: Sitting) (!) 50 97.9 °F (36.6 °C) (Oral) 14 6' (1.829 m) 243 lb (110.2 kg) SpO2 BMI Smoking Status 95% 32.96 kg/m2 Former Smoker BMI and BSA Data Body Mass Index Body Surface Area  
 32.96 kg/m 2 2.37 m 2 Preferred Pharmacy Pharmacy Name Phone [facility] [address] [place] [address], [address] [place] [address] Your Updated Medication List  
  
   
This list is accurate as of 7/31/18  2:04 PM.  Always use your most recent med list.  
  
  
  
  
 benzocaine 10 % mucosal gel Commonly known as:  Lars Francois Apply to sore in mouth as instructed up to 4 times daily as needed. Prescriptions Sent to Pharmacy Refills  
 benzocaine (ORAJEL) 10 % mucosal gel 0 Sig: Apply to sore in mouth as instructed up to 4 times daily as needed. Class: Normal  
 Pharmacy: [facility] [address] [place] [address], [address] [place] [address] [address] #: [phone] Patient Instructions Stomatitis: Care Instructions Your Care Instructions Stomatitis is swelling and redness of the lining of your mouth. It can cause painful sores that can make it hard for you to eat, drink, or swallow. Stomatitis may be caused by a viral or bacterial infection, a disease, or not taking care of your teeth and gums properly. Other causes include reactions to smoking, burns from hot food or drinks, or an allergic reaction. Allergy causes may be a result of flavorings such as spearmint or cinnamon that are used in chewing gum, toothpaste, soft drinks, candies, and other products. Your doctor may prescribe pain medicines or special mouth rinses. He or she may tell you to quit using some products that may be causing the sores. It can take up to 2 weeks for the sores to heal. 
Some people with stomatitis also get a yeast infection of the mouth, called thrush. Medicines can treat this problem. Follow-up care is a key part of your treatment and safety.  Be sure to make and go to all appointments, and call your doctor if you are having problems. It's also a good idea to know your test results and keep a list of the medicines you take. How can you care for yourself at home? · Be safe with medicines. Read and follow all instructions on the label. ¨ If the doctor gave you a prescription medicine for pain, take it as prescribed. ¨ If you are not taking a prescription pain medicine, ask your doctor if you can take an over-the-counter medicine. · If your doctor prescribed antibiotics, take them as directed. Do not stop taking them just because you feel better. You need to take the full course of antibiotics. · Use prescription mouth rinses as prescribed. Ask your doctor if you can freeze the mouth rinse in an ice cube tray. Sucking on a frozen cube of the mouth rinse can help ease the pain. · Make a rinse to keep your mouth from getting dry. Add 1 teaspoon baking soda and ½ teaspoon salt to a quart of water. Use it to rinse your mouth 4 to 6 times each day. Spit out the rinse. Do not swallow it. · Do not use a mouthwash or other over-the-counter rinse with alcohol. These can dry out your mouth or cause more pain. · If your doctor gave you mouthwash or lozenges to treat your yeast infection, use them as directed. · Eat soft foods such as mashed potatoes and other cooked vegetables, noodles, applesauce, clear broth soups, yogurt, and cottage cheese. You can get extra protein by adding protein powder to milk shakes or breakfast drinks. Avoid eating spicy or crunchy foods. · Try eating cold foods such as ice cream or yogurt. · Avoid drinking high-acid juices such as orange, grapefruit, and cranberry juices. · Drink plenty of fluids to prevent dehydration. Drinking through a straw may help with pain. · Practice good oral hygiene. Use a very soft toothbrush to brush your teeth at least two times a day. Or use your finger to brush your teeth if your mouth is sore. When should you call for help? Call your doctor now or seek immediate medical care if: 
  · You have new or worse symptoms of infection, such as: 
¨ Increased pain, swelling, warmth, or redness. ¨ Red streaks leading from the area. ¨ Pus draining from the area. ¨ A fever.  
 Watch closely for changes in your health, and be sure to contact your doctor if: 
  · You do not get better as expected. Where can you learn more? Go to http://marco-bernabe.info/. Enter J856 in the search box to learn more about \"Stomatitis: Care Instructions. \" Current as of: May 12, 2017 Content Version: 11.7 © 4868-9861 CrossCurrent. Care instructions adapted under license by [employer] (which disclaims liability or warranty for this information). If you have questions about a medical condition or this instruction, always ask your healthcare professional. [address] [address] any warranty or liability for your use of this information. Stomatitis: Care Instructions Your Care Instructions Stomatitis is swelling and redness of the lining of your mouth. It can cause painful sores that can make it hard for you to eat, drink, or swallow. Stomatitis may be caused by a viral or bacterial infection, a disease, or not taking care of your teeth and gums properly. Other causes include reactions to smoking, burns from hot food or drinks, or an allergic reaction. Allergy causes may be a result of flavorings such as spearmint or cinnamon that are used in chewing gum, toothpaste, soft drinks, candies, and other products. Your doctor may prescribe pain medicines or special mouth rinses. He or she may tell you to quit using some products that may be causing the sores. It can take up to 2 weeks for the sores to heal. 
Some people with stomatitis also get a yeast infection of the mouth, called thrush. Medicines can treat this problem. Follow-up care is a key part of your treatment and safety. Be sure to make and go to all appointments, and call your doctor if you are having problems. It's also a good idea to know your test results and keep a list of the medicines you take. How can you care for yourself at home? · Be safe with medicines. Read and follow all instructions on the label. ¨ If the doctor gave you a prescription medicine for pain, take it as prescribed. ¨ If you are not taking a prescription pain medicine, ask your doctor if you can take an over-the-counter medicine. · If your doctor prescribed antibiotics, take them as directed. Do not stop taking them just because you feel better. You need to take the full course of antibiotics. · Use prescription mouth rinses as prescribed. Ask your doctor if you can freeze the mouth rinse in an ice cube tray. Sucking on a frozen cube of the mouth rinse can help ease the pain. · Make a rinse to keep your mouth from getting dry. Add 1 teaspoon baking soda and ½ teaspoon salt to a quart of water. Use it to rinse your mouth 4 to 6 times each day. Spit out the rinse. Do not swallow it. · Do not use a mouthwash or other over-the-counter rinse with alcohol. These can dry out your mouth or cause more pain. · If your doctor gave you mouthwash or lozenges to treat your yeast infection, use them as directed. · Eat soft foods such as mashed potatoes and other cooked vegetables, noodles, applesauce, clear broth soups, yogurt, and cottage cheese. You can get extra protein by adding protein powder to milk shakes or breakfast drinks. Avoid eating spicy or crunchy foods. · Try eating cold foods such as ice cream or yogurt. · Avoid drinking high-acid juices such as orange, grapefruit, and cranberry juices. · Drink plenty of fluids to prevent dehydration. Drinking through a straw may help with pain. · Practice good oral hygiene.  Use a very soft toothbrush to brush your teeth at least two times a day. Or use your finger to brush your teeth if your mouth is sore. When should you call for help? Call your doctor now or seek immediate medical care if: 
  · You have new or worse symptoms of infection, such as: 
¨ Increased pain, swelling, warmth, or redness. ¨ Red streaks leading from the area. ¨ Pus draining from the area. ¨ A fever.  
 Watch closely for changes in your health, and be sure to contact your doctor if: 
  · You do not get better as expected. Where can you learn more? Go to http://marco-bernabe.info/. Enter V978 in the search box to learn more about \"Stomatitis: Care Instructions. \" Current as of: May 12, 2017 Content Version: 11.7 © 1250-8011 Estade[employer]oda. Care instructions adapted under license by comment.com (which disclaims liability or warranty for this information). If you have questions about a medical condition or this instruction, always ask your healthcare professional. [address] any warranty or liability for your use of this information. Introducing [facility]! Dear Kyleigh Lozano: Thank you for requesting a Yabbly account. Our records indicate that you already have an active Yabbly account. You can access your account anytime at [email or web address] Did you know that you can access your hospital and ER discharge instructions at any time in Yabbly? You can also review all of your test results from your hospital stay or ER visit. Additional Information If you have questions, please visit the Frequently Asked Questions section of the Yabbly website at [email or web address] Remember, Yabbly is NOT to be used for urgent needs. For medical emergencies, dial 911. Now available from your iPhone and Android! Please provide this summary of care documentation to your next provider. Your primary care clinician is listed as Pastora Lockhart. If you have any questions after today's visit, please call [phone].

## 2018-07-31 NOTE — PROGRESS NOTES
Chief Complaint   Patient presents with    Mouth Pain     pt c/o upper mouth pain front teeth/gum area X1 week     Health Maintenance Due   Topic Date Due    FOBT Q 1 YEAR AGE 50-75  09/29/2018     Visit Vitals    /75 (BP 1 Location: Left arm, BP Patient Position: Sitting)    Pulse (!) 50    Temp 97.9 °F (36.6 °C) (Oral)    Resp 14    Ht 6' (1.829 m)    Wt 243 lb (110.2 kg)    SpO2 95%    BMI 32.96 kg/m2     1. Have you been to the ER, urgent care clinic since your last visit? Hospitalized since your last visit? No    2. Have you seen or consulted any other health care providers outside of the [facility] since your last visit? Include any pap smears or colon screening.  No    Da Gomes, ATIF

## 2019-08-15 ENCOUNTER — HOSPITAL ENCOUNTER (INPATIENT)
Age: 74
LOS: 5 days | Discharge: HOME | DRG: 871 | End: 2019-08-20
Attending: EMERGENCY MEDICINE | Admitting: HOSPITALIST
Payer: MEDICARE

## 2019-08-15 ENCOUNTER — APPOINTMENT (OUTPATIENT)
Dept: CT IMAGING | Age: 74
DRG: 871 | End: 2019-08-15
Attending: EMERGENCY MEDICINE
Payer: MEDICARE

## 2019-08-15 DIAGNOSIS — A41.9 SEVERE SEPSIS (HCC): ICD-10-CM

## 2019-08-15 DIAGNOSIS — R00.1 SINUS BRADYCARDIA: ICD-10-CM

## 2019-08-15 DIAGNOSIS — A41.9 SEPTIC SHOCK (HCC): Primary | ICD-10-CM

## 2019-08-15 DIAGNOSIS — N39.0 URINARY TRACT INFECTION WITHOUT HEMATURIA, SITE UNSPECIFIED: ICD-10-CM

## 2019-08-15 DIAGNOSIS — E87.20 LACTIC ACIDOSIS: ICD-10-CM

## 2019-08-15 DIAGNOSIS — N17.9 ACUTE RENAL FAILURE, UNSPECIFIED ACUTE RENAL FAILURE TYPE (HCC): ICD-10-CM

## 2019-08-15 DIAGNOSIS — R65.20 SEVERE SEPSIS (HCC): ICD-10-CM

## 2019-08-15 DIAGNOSIS — R65.21 SEPTIC SHOCK (HCC): Primary | ICD-10-CM

## 2019-08-15 LAB
ALBUMIN SERPL-MCNC: 3.2 G/DL (ref 3.5–5)
ALBUMIN/GLOB SERPL: 1.1 {RATIO} (ref 1.1–2.2)
ALP SERPL-CCNC: 54 U/L (ref 45–117)
ALT SERPL-CCNC: 29 U/L (ref 12–78)
ANION GAP SERPL CALC-SCNC: 7 MMOL/L (ref 5–15)
AST SERPL-CCNC: 34 U/L (ref 15–37)
BILIRUB SERPL-MCNC: 0.6 MG/DL (ref 0.2–1)
BUN SERPL-MCNC: 16 MG/DL (ref 6–20)
BUN/CREAT SERPL: 9 (ref 12–20)
CALCIUM SERPL-MCNC: 7.9 MG/DL (ref 8.5–10.1)
CHLORIDE SERPL-SCNC: 110 MMOL/L (ref 97–108)
CO2 SERPL-SCNC: 24 MMOL/L (ref 21–32)
CREAT SERPL-MCNC: 1.83 MG/DL (ref 0.7–1.3)
ERYTHROCYTE [DISTWIDTH] IN BLOOD BY AUTOMATED COUNT: 13.5 % (ref 11.5–14.5)
GLOBULIN SER CALC-MCNC: 2.9 G/DL (ref 2–4)
GLUCOSE SERPL-MCNC: 101 MG/DL (ref 65–100)
HCT VFR BLD AUTO: 37.4 % (ref 36.6–50.3)
HGB BLD-MCNC: 12.8 G/DL (ref 12.1–17)
LACTATE SERPL-SCNC: 2.9 MMOL/L (ref 0.4–2)
LACTATE SERPL-SCNC: 3.2 MMOL/L (ref 0.4–2)
MCH RBC QN AUTO: 33.9 PG (ref 26–34)
MCHC RBC AUTO-ENTMCNC: 34.2 G/DL (ref 30–36.5)
MCV RBC AUTO: 98.9 FL (ref 80–99)
NRBC # BLD: 0 K/UL (ref 0–0.01)
NRBC BLD-RTO: 0 PER 100 WBC
PLATELET # BLD AUTO: 144 K/UL (ref 150–400)
PMV BLD AUTO: 10.1 FL (ref 8.9–12.9)
POTASSIUM SERPL-SCNC: 4.1 MMOL/L (ref 3.5–5.1)
PROT SERPL-MCNC: 6.1 G/DL (ref 6.4–8.2)
RBC # BLD AUTO: 3.78 M/UL (ref 4.1–5.7)
SODIUM SERPL-SCNC: 141 MMOL/L (ref 136–145)
WBC # BLD AUTO: 18.5 K/UL (ref 4.1–11.1)

## 2019-08-15 PROCEDURE — 65610000006 HC RM INTENSIVE CARE

## 2019-08-15 PROCEDURE — 74011000258 HC RX REV CODE- 258: Performed by: HOSPITALIST

## 2019-08-15 PROCEDURE — 85027 COMPLETE CBC AUTOMATED: CPT

## 2019-08-15 PROCEDURE — 99284 EMERGENCY DEPT VISIT MOD MDM: CPT

## 2019-08-15 PROCEDURE — 74011000250 HC RX REV CODE- 250: Performed by: EMERGENCY MEDICINE

## 2019-08-15 PROCEDURE — 87086 URINE CULTURE/COLONY COUNT: CPT

## 2019-08-15 PROCEDURE — 51798 US URINE CAPACITY MEASURE: CPT

## 2019-08-15 PROCEDURE — 80053 COMPREHEN METABOLIC PANEL: CPT

## 2019-08-15 PROCEDURE — 83605 ASSAY OF LACTIC ACID: CPT

## 2019-08-15 PROCEDURE — 36415 COLL VENOUS BLD VENIPUNCTURE: CPT

## 2019-08-15 PROCEDURE — 74011250637 HC RX REV CODE- 250/637: Performed by: HOSPITALIST

## 2019-08-15 PROCEDURE — P9047 ALBUMIN (HUMAN), 25%, 50ML: HCPCS | Performed by: HOSPITALIST

## 2019-08-15 PROCEDURE — 74011250636 HC RX REV CODE- 250/636: Performed by: HOSPITALIST

## 2019-08-15 RX ORDER — VANCOMYCIN 2 GRAM/500 ML IN 0.9 % SODIUM CHLORIDE INTRAVENOUS
2000 ONCE
Status: COMPLETED | OUTPATIENT
Start: 2019-08-15 | End: 2019-08-15

## 2019-08-15 RX ORDER — HEPARIN SODIUM 5000 [USP'U]/ML
5000 INJECTION, SOLUTION INTRAVENOUS; SUBCUTANEOUS EVERY 8 HOURS
Status: DISCONTINUED | OUTPATIENT
Start: 2019-08-15 | End: 2019-08-16

## 2019-08-15 RX ORDER — NOREPINEPHRINE BITARTRATE/D5W 8 MG/250ML
2-90 PLASTIC BAG, INJECTION (ML) INTRAVENOUS
Status: DISCONTINUED | OUTPATIENT
Start: 2019-08-15 | End: 2019-08-17

## 2019-08-15 RX ORDER — ONDANSETRON 2 MG/ML
4 INJECTION INTRAMUSCULAR; INTRAVENOUS
Status: DISCONTINUED | OUTPATIENT
Start: 2019-08-15 | End: 2019-08-20 | Stop reason: HOSPADM

## 2019-08-15 RX ORDER — ACETAMINOPHEN 325 MG/1
650 TABLET ORAL
Status: DISCONTINUED | OUTPATIENT
Start: 2019-08-15 | End: 2019-08-20 | Stop reason: HOSPADM

## 2019-08-15 RX ORDER — SODIUM CHLORIDE 9 MG/ML
150 INJECTION, SOLUTION INTRAVENOUS CONTINUOUS
Status: DISCONTINUED | OUTPATIENT
Start: 2019-08-15 | End: 2019-08-18

## 2019-08-15 RX ORDER — SODIUM CHLORIDE 0.9 % (FLUSH) 0.9 %
5-40 SYRINGE (ML) INJECTION EVERY 8 HOURS
Status: DISCONTINUED | OUTPATIENT
Start: 2019-08-15 | End: 2019-08-20 | Stop reason: HOSPADM

## 2019-08-15 RX ORDER — VANCOMYCIN/0.9 % SOD CHLORIDE 1.5G/250ML
1500 PLASTIC BAG, INJECTION (ML) INTRAVENOUS EVERY 24 HOURS
Status: DISCONTINUED | OUTPATIENT
Start: 2019-08-16 | End: 2019-08-16

## 2019-08-15 RX ORDER — METRONIDAZOLE 500 MG/100ML
500 INJECTION, SOLUTION INTRAVENOUS EVERY 12 HOURS
Status: DISCONTINUED | OUTPATIENT
Start: 2019-08-15 | End: 2019-08-16

## 2019-08-15 RX ORDER — FAMOTIDINE 10 MG/ML
20 INJECTION INTRAVENOUS EVERY 12 HOURS
Status: DISCONTINUED | OUTPATIENT
Start: 2019-08-15 | End: 2019-08-15 | Stop reason: DRUGHIGH

## 2019-08-15 RX ORDER — SODIUM CHLORIDE 0.9 % (FLUSH) 0.9 %
5-40 SYRINGE (ML) INJECTION AS NEEDED
Status: DISCONTINUED | OUTPATIENT
Start: 2019-08-15 | End: 2019-08-20 | Stop reason: HOSPADM

## 2019-08-15 RX ORDER — ALBUMIN HUMAN 250 G/1000ML
25 SOLUTION INTRAVENOUS ONCE
Status: COMPLETED | OUTPATIENT
Start: 2019-08-15 | End: 2019-08-15

## 2019-08-15 RX ADMIN — CEFEPIME HYDROCHLORIDE 2 G: 2 INJECTION, POWDER, FOR SOLUTION INTRAVENOUS at 12:12

## 2019-08-15 RX ADMIN — Medication 10 ML: at 13:42

## 2019-08-15 RX ADMIN — FAMOTIDINE 20 MG: 10 INJECTION, SOLUTION INTRAVENOUS at 12:00

## 2019-08-15 RX ADMIN — METRONIDAZOLE 500 MG: 500 INJECTION, SOLUTION INTRAVENOUS at 23:35

## 2019-08-15 RX ADMIN — HEPARIN SODIUM 5000 UNITS: 5000 INJECTION INTRAVENOUS; SUBCUTANEOUS at 12:00

## 2019-08-15 RX ADMIN — SODIUM CHLORIDE 150 ML/HR: 900 INJECTION, SOLUTION INTRAVENOUS at 11:33

## 2019-08-15 RX ADMIN — SODIUM CHLORIDE 150 ML/HR: 900 INJECTION, SOLUTION INTRAVENOUS at 17:57

## 2019-08-15 RX ADMIN — Medication 10 ML: at 21:05

## 2019-08-15 RX ADMIN — VANCOMYCIN HYDROCHLORIDE 2000 MG: 10 INJECTION, POWDER, LYOPHILIZED, FOR SOLUTION INTRAVENOUS at 13:56

## 2019-08-15 RX ADMIN — ALBUMIN (HUMAN) 25 G: 0.25 INJECTION, SOLUTION INTRAVENOUS at 11:28

## 2019-08-15 RX ADMIN — SODIUM CHLORIDE 1000 ML: 900 INJECTION, SOLUTION INTRAVENOUS at 12:48

## 2019-08-15 RX ADMIN — SODIUM CHLORIDE 150 ML/HR: 900 INJECTION, SOLUTION INTRAVENOUS at 23:51

## 2019-08-15 RX ADMIN — METRONIDAZOLE 500 MG: 500 INJECTION, SOLUTION INTRAVENOUS at 12:48

## 2019-08-15 RX ADMIN — Medication 15 MCG/MIN: at 11:08

## 2019-08-15 RX ADMIN — FAMOTIDINE 20 MG: 10 INJECTION, SOLUTION INTRAVENOUS at 21:04

## 2019-08-15 RX ADMIN — Medication 1 AMPULE: at 21:10

## 2019-08-15 RX ADMIN — HEPARIN SODIUM 5000 UNITS: 5000 INJECTION INTRAVENOUS; SUBCUTANEOUS at 20:00

## 2019-08-15 RX ADMIN — Medication 17 MCG/MIN: at 18:38

## 2019-08-15 NOTE — PROGRESS NOTES
[facility]  Pulmonary, Critical Care, and Sleep Medicine    Name: Dell Scott MRN: [id number]   : 1945 Hospital: [facility]   Date: 8/15/2019        Critical Care Initial Patient Consult    IMPRESSION:   · Severe Sepsis and Shock, WBC of 18.5.   · Hgb of 12.8. · Possible Acute UTI, Gram negatives noted on Urine Cx. · Fevers, Rigors. · MIld Acute renal insufficiency, Cr of 1.8. · Sinus Bradycardia with HR of 40-50s. · BPH and Elevated PSA. · Desires to be full code. · Critically ill due to severe UTI, Urosepsis. Need or pressors to treat severe shock. Over 35 min CC, EOP. RECOMMENDATIONS:   · On Cefepime, Flagyl, and Vanc. · Will need to follow Cx. · Pressors to Keep MAP over 65. On levophed at 18mcg. · IV hydration  · May need further  evaluation. · Serial labs. Subjective/History: This patient has been seen and evaluated at the request of Dr. Donya Jones for above. Patient is a 75 y.o. male who went to bed last pm in his normal state of health. Awoke with rigors. He also noted that he had vomiting and diarrhea about 12 times. He was noted by his wife to have a low blood pressure. Per wife's report he had decrease po intake for several days. Has been feeling very fatigued over last 18 hrs. While in ER was noted to have low blood pressures. Had CVC placed and was placed on levophed. Has been passing his urine since in ER. He was seen in the ICU and reports he is feeling better. Past Medical History:   Diagnosis Date    Bradycardia     Dyslipidemia     Elevated PSA     Skin lesion of face     UTI (urinary tract infection)       History reviewed. No pertinent surgical history.    Prior to Admission medications    Not on File     Current Facility-Administered Medications   Medication Dose Route Frequency    NOREPINephrine (LEVOPHED) 8 mg in 5% dextrose 250mL infusion  2-90 mcg/min IntraVENous TITRATE    0.9% sodium chloride infusion  150 mL/hr IntraVENous CONTINUOUS    sodium chloride (NS) flush 5-40 mL  5-40 mL IntraVENous Q8H    heparin (porcine) injection 5,000 Units  5,000 Units SubCUTAneous Q8H    famotidine (PF) (PEPCID) injection 20 mg  20 mg IntraVENous Q12H    cefepime (MAXIPIME) 2 g in 0.9% sodium chloride (MBP/ADV) 100 mL  2 g IntraVENous Q12H    metroNIDAZOLE (FLAGYL) IVPB premix 500 mg  500 mg IntraVENous Q12H    vancomycin (VANCOCIN) 2000 mg in  ml infusion  2,000 mg IntraVENous ONCE    [START ON 2019] vancomycin (VANCOCIN) 1500 mg in  ml infusion  1,500 mg IntraVENous Q24H     No Known Allergies   Social History     Tobacco Use    Smoking status: Former Smoker     Packs/day: 1.00     Years: 5.00     Pack years: 5.00     Types: Cigarettes     Last attempt to quit: 1967     Years since quittin.9    Smokeless tobacco: Never Used   Substance Use Topics    Alcohol use:  Yes     Alcohol/week: 7.0 standard drinks     Types: 7 Glasses of wine per week     Comment: 16 oz tonight      Family History   Problem Relation Age of Onset    No Known Problems Mother     Alcohol abuse Father     Coronary Artery Disease Neg Hx         Review of Systems:  Constitutional: positive for fatigue  Eyes: negative  Ears, nose, mouth, throat, and face: negative  Respiratory: negative  Cardiovascular: negative  Gastrointestinal: positive for nausea, vomiting and diarrhea  Genitourinary:positive for dysuria  Integument/breast: negative  Hematologic/lymphatic: negative  Musculoskeletal:negative  Neurological: negative  Behavioral/Psych: negative  Endocrine: negative  Allergic/Immunologic: negative    Objective:   Vital Signs:    Visit Vitals  /66 (BP 1 Location: Left arm, BP Patient Position: At rest)   Pulse 60   Temp 97.9 °F (36.6 °C)   Resp 19   Ht 6' (1.829 m)   Wt 108.9 kg (240 lb 1.3 oz)   SpO2 93%   BMI 32.56 kg/m²       O2 Device: Nasal cannula   O2 Flow Rate (L/min): 4 l/min   Temp (24hrs), Av °F (37.8 °C), Min:97.8 °F (36.6 °C), Max:103.5 °F (39.7 °C)       Intake/Output:   Last shift:      No intake/output data recorded. Last 3 shifts: No intake/output data recorded. No intake or output data in the 24 hours ending 08/15/19 1506  Hemodynamics:   PAP:   CO:     Wedge:   CI:     CVP:    SVR:       PVR:       Ventilator Settings:  Mode Rate Tidal Volume Pressure FiO2 PEEP                    Peak airway pressure:      Minute ventilation:        Physical Exam:    General:  Alert, cooperative, no distress, appears stated age. Conversant. NO distress. Head:  Normocephalic, without obvious abnormality, atraumatic. Eyes:  Conjunctivae/corneas clear. PERRL, EOMs intact. Nose: Nares normal. Septum midline. Mucosa normal. No drainage or sinus tenderness. Throat: Lips, mucosa, and tongue normal. Teeth and gums normal.   Neck: Supple, symmetrical, trachea midline, no adenopathy, thyroid: no enlargment/tenderness/nodules, no carotid bruit and no JVD. Back:   Symmetric, no curvature. ROM normal.   Lungs:   Clear to auscultation bilaterally. Chest wall:  No tenderness or deformity. Heart:  Regular rate and rhythm, S1, S2 normal, no murmur, click, rub or gallop. Abdomen:   Soft, non-tender. Bowel sounds normal. No masses,  No organomegaly. Obese. Extremities: Extremities normal, atraumatic, no cyanosis or edema. Pulses: 2+ and symmetric all extremities. Skin: Skin color, texture, turgor normal. No rashes or lesions   Lymph nodes: Cervical, supraclavicular, and axillary nodes normal.   Neurologic: Grossly nonfocal, has good strength of BUE and BLE. Psych: NO anxiety or no depression.         Data:     Recent Results (from the past 24 hour(s))   CBC WITH AUTOMATED DIFF    Collection Time: 08/15/19  3:25 AM   Result Value Ref Range    WBC 5.1 4.1 - 11.1 K/uL    RBC 3.92 (L) 4.10 - 5.70 M/uL    HGB 12.8 12.1 - 17.0 g/dL    HCT 39.1 36.6 - 50.3 %    MCV 99.7 (H) 80.0 - 99.0 FL    MCH 32.7 26.0 - 34.0 PG    MCHC 32.7 30.0 - 36.5 g/dL    RDW 13.5 11.5 - 14.5 %    PLATELET 758 (L) 100 - 400 K/uL    MPV 10.1 8.9 - 12.9 FL    NEUTROPHILS 91 (H) 32 - 75 %    LYMPHOCYTES 6 (L) 12 - 49 %    MONOCYTES 1 (L) 5 - 13 %    EOSINOPHILS 1 0 - 7 %    BASOPHILS 1 0 - 1 %    ABS. NEUTROPHILS 4.7 1.8 - 8.0 K/UL    ABS. LYMPHOCYTES 0.3 (L) 0.8 - 3.5 K/UL    ABS. MONOCYTES 0.0 0.0 - 1.0 K/UL    ABS. EOSINOPHILS 0.0 0.0 - 0.4 K/UL    ABS. BASOPHILS 0.0 0.0 - 0.1 K/UL   METABOLIC PANEL, COMPREHENSIVE    Collection Time: 08/15/19  3:25 AM   Result Value Ref Range    Sodium 142 136 - 145 mmol/L    Potassium 4.4 3.5 - 5.1 mmol/L    Chloride 105 97 - 108 mmol/L    CO2 26 21 - 32 mmol/L    Anion gap 11 5 - 15 mmol/L    Glucose 92 65 - 100 mg/dL    BUN 13 6 - 20 MG/DL    Creatinine 1.41 (H) 0.70 - 1.30 MG/DL    BUN/Creatinine ratio 9 (L) 12 - 20      GFR est AA 60 (L) >60 ml/min/1.73m2    GFR est non-AA 49 (L) >60 ml/min/1.73m2    Calcium 7.9 (L) 8.5 - 10.1 MG/DL    Bilirubin, total 0.4 0.2 - 1.0 MG/DL    ALT (SGPT) 32 12 - 78 U/L    AST (SGOT) 42 (H) 15 - 37 U/L    Alk.  phosphatase 78 45 - 117 U/L    Protein, total 5.7 (L) 6.4 - 8.2 g/dL    Albumin 3.1 (L) 3.5 - 5.0 g/dL    Globulin 2.6 2.0 - 4.0 g/dL    A-G Ratio 1.2 1.1 - 2.2     LIPASE    Collection Time: 08/15/19  3:25 AM   Result Value Ref Range    Lipase 105 73 - 393 U/L   TROPONIN I    Collection Time: 08/15/19  3:25 AM   Result Value Ref Range    Troponin-I, Qt. <0.05 <0.05 ng/mL   CK W/ CKMB & INDEX    Collection Time: 08/15/19  3:25 AM   Result Value Ref Range    CK 98 39 - 308 U/L    CK - MB <1.0 <3.6 NG/ML    CK-MB Index Cannot be calculated 0.0 - 2.5     ETHYL ALCOHOL    Collection Time: 08/15/19  3:25 AM   Result Value Ref Range    ALCOHOL(ETHYL),SERUM <10 <10 MG/DL   EKG, 12 LEAD, INITIAL    Collection Time: 08/15/19  4:32 AM   Result Value Ref Range    Ventricular Rate 91 BPM    Atrial Rate 91 BPM    P-R Interval 160 ms    QRS Duration 92 ms    Q-T Interval 328 ms    QTC Calculation (Bezet) 403 ms    Calculated P Axis 44 degrees    Calculated R Axis 79 degrees    Calculated T Axis 52 degrees    Diagnosis       Sinus rhythm with premature atrial complexes in a pattern of bigeminy  Low voltage QRS  Borderline ECG  No previous ECGs available  Confirmed by Blaine Payton MD, --- ([id number]) on 8/15/2019 5:49:55 AM     LACTIC ACID    Collection Time: 08/15/19  5:30 AM   Result Value Ref Range    Lactic acid 4.1 (HH) 0.4 - 2.0 MMOL/L   CULTURE, BLOOD    Collection Time: 08/15/19  5:30 AM   Result Value Ref Range    Special Requests: NO SPECIAL REQUESTS      Culture result: NO GROWTH AFTER 7 HOURS     CULTURE, BLOOD    Collection Time: 08/15/19  6:20 AM   Result Value Ref Range    Special Requests: NO SPECIAL REQUESTS      Culture result: NO GROWTH AFTER 7 HOURS     PROTHROMBIN TIME + INR    Collection Time: 08/15/19  6:20 AM   Result Value Ref Range    INR 1.1 0.9 - 1.1      Prothrombin time 10.8 9.0 - 11.1 sec   PTT    Collection Time: 08/15/19  6:20 AM   Result Value Ref Range    aPTT 20.2 (L) 22.1 - 32.0 sec    aPTT, therapeutic range     58.0 - 77.0 SECS   CULTURE, URINE    Collection Time: 08/15/19  6:40 AM   Result Value Ref Range    Special Requests: NO SPECIAL REQUESTS      [place] Count >100,000  COLONIES/mL        Culture result: GRAM NEGATIVE RODS (A)     URINALYSIS W/ RFLX MICROSCOPIC    Collection Time: 08/15/19  6:40 AM   Result Value Ref Range    Color YELLOW/STRAW      Appearance CLEAR CLEAR      Specific gravity 1.020 1.003 - 1.030      pH (UA) 5.0 5.0 - 8.0      Protein 100 (A) NEG mg/dL    Glucose NEGATIVE  NEG mg/dL    Ketone NEGATIVE  NEG mg/dL    Bilirubin NEGATIVE  NEG      Blood NEGATIVE  NEG      Urobilinogen 0.2 0.2 - 1.0 EU/dL    Nitrites NEGATIVE  NEG      Leukocyte Esterase TRACE (A) NEG      WBC 5-10 0 - 4 /hpf    RBC 0-5 0 - 5 /hpf    Epithelial cells FEW FEW /lpf    Bacteria 2+ (A) NEG /hpf    Mucus 1+ /lpf    UA:UC IF INDICATED URINE CULTURE ORDERED (A) CNI     LACTIC ACID    Collection Time: 08/15/19 11:15 AM   Result Value Ref Range    Lactic acid 3.2 (HH) 0.4 - 2.0 MMOL/L   CBC W/O DIFF    Collection Time: 08/15/19 11:38 AM   Result Value Ref Range    WBC 18.5 (H) 4.1 - 11.1 K/uL    RBC 3.78 (L) 4.10 - 5.70 M/uL    HGB 12.8 12.1 - 17.0 g/dL    HCT 37.4 36.6 - 50.3 %    MCV 98.9 80.0 - 99.0 FL    MCH 33.9 26.0 - 34.0 PG    MCHC 34.2 30.0 - 36.5 g/dL    RDW 13.5 11.5 - 14.5 %    PLATELET 117 (L) 691 - 400 K/uL    MPV 10.1 8.9 - 12.9 FL    NRBC 0.0 0  WBC    ABSOLUTE NRBC 0.00 0.00 - 6.58 K/uL   METABOLIC PANEL, COMPREHENSIVE    Collection Time: 08/15/19 11:38 AM   Result Value Ref Range    Sodium 141 136 - 145 mmol/L    Potassium 4.1 3.5 - 5.1 mmol/L    Chloride 110 (H) 97 - 108 mmol/L    CO2 24 21 - 32 mmol/L    Anion gap 7 5 - 15 mmol/L    Glucose 101 (H) 65 - 100 mg/dL    BUN 16 6 - 20 MG/DL    Creatinine 1.83 (H) 0.70 - 1.30 MG/DL    BUN/Creatinine ratio 9 (L) 12 - 20      GFR est AA 44 (L) >60 ml/min/1.73m2    GFR est non-AA 36 (L) >60 ml/min/1.73m2    Calcium 7.9 (L) 8.5 - 10.1 MG/DL    Bilirubin, total 0.6 0.2 - 1.0 MG/DL    ALT (SGPT) 29 12 - 78 U/L    AST (SGOT) 34 15 - 37 U/L    Alk. phosphatase 54 45 - 117 U/L    Protein, total 6.1 (L) 6.4 - 8.2 g/dL    Albumin 3.2 (L) 3.5 - 5.0 g/dL    Globulin 2.9 2.0 - 4.0 g/dL    A-G Ratio 1.1 1.1 - 2.2               Telemetry: 8-15-19: Sinus rhythm with premature atrial complexes in a pattern of bigeminy   Low voltage QRS Borderline ECG     Imaging:  I have personally reviewed the patients radiographs and have reviewed the reports:  CT of abdomen: 8-15-19:  was non revealing. NO acute findings.         Perez Lemons MD

## 2019-08-15 NOTE — H&P
Hospitalist Admission Note    NAME: Britney Ceja   :  1945   MRN:  [id number]     Date/Time:  8/15/2019 11:07 AM    Patient PCP: Leah Judge PA-C  ______________________________________________________________________   Assessment & Plan:  Severe sepsis with septic shock, POA  Possible uti, POA  Cathed UA with 2+ bacteria 5-10 WBC  Hx Citrobacter uti 2017  CASSIE Cr 1.4, was 0.9 in 2017  Sinus bradycardia, chronic baseline HR 40-50s. Has seen Dr. Claudia Fenton in 2017 with negative stress test, echo EF 65% with grade 1 diastolic dysfunction  BPH with hx elevated PSA. Bladder scan done at [facility] 250ml    --repeat lactic, CMP, CBC  --continue levophed via rt IJ central line placed at [facility]  --ER nurse has called over to [facility] lab to get urine culture added to UA obtained there as no urine culture sent. Get urine culture here too as not certain outside lab will add on. Follow up urine culture. --given zosyn at outside hospital.  He remains hypotensive BP in 80s on 15mcg/kg/min levophed. Will give broad abx with cefepime, flagyl, vancomycin for now.  --IVF with NS 150ml/hr.    --albumin 25g x 1 to help with pressure  --stress ulcer prophylaxis with pepcid  --if has diarrhea, will need stool culture and C. diff    Body mass index is 32.56 kg/m². Code: discussed, wants FULL Code. Wants to rescind DDNR on file but it is not good since was not signed by a physician. DVT prophylaxis: heparin  Surrogate decision maker:  Wife Brendon escoto        Subjective:   CHIEF COMPLAINT:  Fever, chills, nausea, vomiting, diarrhea    HISTORY OF PRESENT ILLNESS:     Britney Cjea is a 75 y.o.  male transferred from [facility] ER to [facility] ER with septic shock. He reports feeling fine until yesterday evening at 9pm developed severe chills, shaking uncontrollably, fever (temp was 112 with home thermometer, he is not sure it is working right).   Was nauseated and vomited x 6 tan colored liquid, would have diarrhea whenever he vomited. Weak, no control of body. Denies abdominal pain, dysuria. Chronic urinary hesitancy from enlarged prostate. No chest pain. Has nonproductive cough for 2-3 weeks, no SOB. Denies rash, recent abx, travel, sick contact. Outside ER had fever 103, BP in 80s so given IVF 2L, started on levophed and transferred to [facility]. Given zosyn. We were asked to admit for work up and evaluation of the above problems. Past Medical History:   Diagnosis Date    Bradycardia     Dyslipidemia     Elevated PSA     Skin lesion of face     UTI (urinary tract infection)       History reviewed. No pertinent surgical history. Social History     Tobacco Use    Smoking status: Former Smoker     Packs/day: 1.00     Years: 5.00     Pack years: 5.00     Types: Cigarettes     Last attempt to quit: 1967     Years since quittin.9    Smokeless tobacco: Never Used   Substance Use Topics    Alcohol use: Yes     Alcohol/week: 7.0 standard drinks     Types: 7 Glasses of wine per week     Comment: 16 oz tonight    Drug use:  Denies  Retired . , independent ADLs baseline    Family History   Problem Relation Age of Onset    No Known Problems Mother     Alcohol abuse Father     Coronary Artery Disease Neg Hx      No Known Allergies     Prior to Admission medications    Not on File     REVIEW OF SYSTEMS:  POSITIVE= Bold.   Negative = normal text  General:  fever, chills, sweats, generalized weakness, weight loss/gain, loss of appetite  Eyes:  blurred vision, eye pain, loss of vision, diplopia  Ear Nose and Throat:  rhinorrhea, pharyngitis  Respiratory:   cough, sputum production, SOB, wheezing, PATEL, pleuritic pain  Cardiology:  chest pain, palpitations, orthopnea, PND, edema, syncope   Gastrointestinal:  abdominal pain, N/V, dysphagia, diarrhea, constipation, bleeding  Genitourinary:  frequency, urgency, dysuria, hematuria, incontinence  Muskuloskeletal :  arthralgia, myalgia  Hematology:  easy bruising, bleeding, lymphadenopathy  Dermatological:  rash, ulceration, pruritis  Endocrine:  hot flashes or polydipsia  Neurological:  headache, dizziness, confusion, focal weakness, paresthesia, memory loss, gait disturbance  Psychological: anxiety, depression, agitation      Objective:   VITALS:    Visit Vitals  BP (!) 79/48   Pulse 70   Temp 97.8 °F (36.6 °C)   Resp 20   Ht 6' (1.829 m)   Wt 108.9 kg (240 lb 1.3 oz)   SpO2 92%   BMI 32.56 kg/m²     Temp (24hrs), Av.4 °F (38 °C), Min:97.8 °F (36.6 °C), Max:103.5 °F (39.7 °C)    Body mass index is 32.56 kg/m². PHYSICAL EXAM:    General:    Alert, overweight male, cooperative, no distress, appears stated age. HEENT: Atraumatic, anicteric sclerae, pink conjunctivae     No oral ulcers, mucosa very dry, no teeth on top, few teeth on bottom, throat clear. Hearing intact. Neck:  Supple, symmetrical,  thyroid: non tender. Right IJ central line in place  Lungs:   Few crackles in left base otherwise clear. No Wheezing or Rhonchi. Chest wall:  No tenderness  No Accessory muscle use. Heart:   Regular  rhythm,  No  murmur   No gallop. Trace pretibial edema. Abdomen:   Soft, non-tender. Not distended. Bowel sounds normal. No masses  Extremities: No cyanosis. No clubbing  Skin:     Pale Not Jaundiced  No rashes   Psych:  Good insight. Not depressed. Not anxious or agitated. Neurologic: EOMs intact. No facial asymmetry. No aphasia or slurred speech. Alert and oriented X 3. Peripheral pulse: Bilateral, DP, 1+  Capillary refill:  Pale nail beds, no mottling on feet    IMAGING RESULTS:   []       I have personally reviewed the actual   []     CXR  []     CT scan  CXR: reviewed, normal heart size, no infiltrate  CT abd/pelvis without contrast:  LUNG BASES: Clear. INCIDENTALLY IMAGED HEART AND MEDIASTINUM: Unremarkable. LIVER: No mass or biliary dilatation. GALLBLADDER: Unremarkable. SPLEEN: No mass.   PANCREAS: No mass or ductal dilatation. ADRENALS: Unremarkable. KIDNEYS/URETERS: Left renal cyst.  STOMACH: Unremarkable. SMALL BOWEL: No dilatation or wall thickening. COLON: No dilatation or wall thickening. APPENDIX: Unremarkable. PERITONEUM: No ascites or pneumoperitoneum. RETROPERITONEUM: No lymphadenopathy or aortic aneurysm. REPRODUCTIVE ORGANS: Enlarged prostate. URINARY BLADDER: No mass or calculus. BONES: Degenerative changes L4-S1. ADDITIONAL COMMENTS: Right inguinal hernia  EKG:  SR 91   ________________________________________________________________________  Care Plan discussed with:    Comments   Patient y [facility]:      ________________________________________________________________________  Prophylaxis:  GI pepcid   DVT heparin   ________________________________________________________________________  Recommended Disposition:   Home with Family y   HH/PT/OT/RN    SNF/LTC    ANA    ________________________________________________________________________  Code Status:  Full Code y   DNR/DNI    ________________________________________________________________________  TOTAL TIME:  40 minutes critical care      Comments    y Reviewed previous records       ______________________________________________________________________  Argentina Shaw MD      Procedures: see electronic medical records for all procedures/Xrays and details which were not copied into this note but were reviewed prior to creation of Plan.     LAB DATA REVIEWED:    Recent Results (from the past 24 hour(s))   CBC WITH AUTOMATED DIFF    Collection Time: 08/15/19  3:25 AM   Result Value Ref Range    WBC 5.1 4.1 - 11.1 K/uL    RBC 3.92 (L) 4.10 - 5.70 M/uL    HGB 12.8 12.1 - 17.0 g/dL    HCT 39.1 36.6 - 50.3 %    MCV 99.7 (H) 80.0 - 99.0 FL    MCH 32.7 26.0 - 34.0 PG    MCHC 32.7 30.0 - 36.5 g/dL    RDW 13.5 11.5 - 14.5 %    PLATELET 664 (L) 820 - 400 K/uL    MPV 10.1 8.9 - 12.9 FL NEUTROPHILS 91 (H) 32 - 75 %    LYMPHOCYTES 6 (L) 12 - 49 %    MONOCYTES 1 (L) 5 - 13 %    EOSINOPHILS 1 0 - 7 %    BASOPHILS 1 0 - 1 %    ABS. NEUTROPHILS 4.7 1.8 - 8.0 K/UL    ABS. LYMPHOCYTES 0.3 (L) 0.8 - 3.5 K/UL    ABS. MONOCYTES 0.0 0.0 - 1.0 K/UL    ABS. EOSINOPHILS 0.0 0.0 - 0.4 K/UL    ABS. BASOPHILS 0.0 0.0 - 0.1 K/UL   METABOLIC PANEL, COMPREHENSIVE    Collection Time: 08/15/19  3:25 AM   Result Value Ref Range    Sodium 142 136 - 145 mmol/L    Potassium 4.4 3.5 - 5.1 mmol/L    Chloride 105 97 - 108 mmol/L    CO2 26 21 - 32 mmol/L    Anion gap 11 5 - 15 mmol/L    Glucose 92 65 - 100 mg/dL    BUN 13 6 - 20 MG/DL    Creatinine 1.41 (H) 0.70 - 1.30 MG/DL    BUN/Creatinine ratio 9 (L) 12 - 20      GFR est AA 60 (L) >60 ml/min/1.73m2    GFR est non-AA 49 (L) >60 ml/min/1.73m2    Calcium 7.9 (L) 8.5 - 10.1 MG/DL    Bilirubin, total 0.4 0.2 - 1.0 MG/DL    ALT (SGPT) 32 12 - 78 U/L    AST (SGOT) 42 (H) 15 - 37 U/L    Alk.  phosphatase 78 45 - 117 U/L    Protein, total 5.7 (L) 6.4 - 8.2 g/dL    Albumin 3.1 (L) 3.5 - 5.0 g/dL    Globulin 2.6 2.0 - 4.0 g/dL    A-G Ratio 1.2 1.1 - 2.2     LIPASE    Collection Time: 08/15/19  3:25 AM   Result Value Ref Range    Lipase 105 73 - 393 U/L   TROPONIN I    Collection Time: 08/15/19  3:25 AM   Result Value Ref Range    Troponin-I, Qt. <0.05 <0.05 ng/mL   CK W/ CKMB & INDEX    Collection Time: 08/15/19  3:25 AM   Result Value Ref Range    CK 98 39 - 308 U/L    CK - MB <1.0 <3.6 NG/ML    CK-MB Index Cannot be calculated 0.0 - 2.5     ETHYL ALCOHOL    Collection Time: 08/15/19  3:25 AM   Result Value Ref Range    ALCOHOL(ETHYL),SERUM <10 <10 MG/DL   EKG, 12 LEAD, INITIAL    Collection Time: 08/15/19  4:32 AM   Result Value Ref Range    Ventricular Rate 91 BPM    Atrial Rate 91 BPM    P-R Interval 160 ms    QRS Duration 92 ms    Q-T Interval 328 ms    QTC Calculation (Bezet) 403 ms    Calculated P Axis 44 degrees    Calculated R Axis 79 degrees    Calculated T Axis 52 degrees Diagnosis       Sinus rhythm with premature atrial complexes in a pattern of bigeminy  Low voltage QRS  Borderline ECG  No previous ECGs available  Confirmed by Radha Fields MD, --- ([id number]) on 8/15/2019 5:49:55 AM     LACTIC ACID    Collection Time: 08/15/19  5:30 AM   Result Value Ref Range    Lactic acid 4.1 (HH) 0.4 - 2.0 MMOL/L   CULTURE, BLOOD    Collection Time: 08/15/19  5:30 AM   Result Value Ref Range    Special Requests: NO SPECIAL REQUESTS      Culture result: NO GROWTH AFTER 1 HOUR     PROTHROMBIN TIME + INR    Collection Time: 08/15/19  6:20 AM   Result Value Ref Range    INR 1.1 0.9 - 1.1      Prothrombin time 10.8 9.0 - 11.1 sec   PTT    Collection Time: 08/15/19  6:20 AM   Result Value Ref Range    aPTT 20.2 (L) 22.1 - 32.0 sec    aPTT, therapeutic range     58.0 - 77.0 SECS   URINALYSIS W/ RFLX MICROSCOPIC    Collection Time: 08/15/19  6:40 AM   Result Value Ref Range    Color YELLOW/STRAW      Appearance CLEAR CLEAR      Specific gravity 1.020 1.003 - 1.030      pH (UA) 5.0 5.0 - 8.0      Protein 100 (A) NEG mg/dL    Glucose NEGATIVE  NEG mg/dL    Ketone NEGATIVE  NEG mg/dL    Bilirubin NEGATIVE  NEG      Blood NEGATIVE  NEG      Urobilinogen 0.2 0.2 - 1.0 EU/dL    Nitrites NEGATIVE  NEG      Leukocyte Esterase TRACE (A) NEG      WBC 5-10 0 - 4 /hpf    RBC 0-5 0 - 5 /hpf    Epithelial cells FEW FEW /lpf    Bacteria 2+ (A) NEG /hpf    Mucus 1+ /lpf    UA:UC IF INDICATED URINE CULTURE ORDERED (A) CNI

## 2019-08-15 NOTE — PROGRESS NOTES
Reason for Admission:   Pt transported from [facility] for Sepsis                   RRAT Score:        5             Plan for utilizing home health:   Pt interested in services                       Current Advanced Directive/Advance Care Plan: On file  From 2016, however no agent assigned, DDNR on file from 2016, however no physician signature on form. Transition of Care Plan:    74 y/o male transferred from [facility] due to sepis requiring Levophed drip. Per [facility] notes pt came to ED for Nausea, vomiting and diarrhea pt found to have low BP's as well as elevated temperature during assessment in ED. Pt currently alert and oriented x 4, conversant. CM visited pt in room, introduced self and role. Pt verbalized understanding. CM verified demographic information with pt at bedside. No family currently present, pt states spouse will be in later today. Pt states at baseline he is independent of ADL's/IADL's. Pt states he currently drives, wife will transport at [place]. Pt states wife and family supportive. Pt denies DME use at this time. Pt denies [facility] services or SNF use in the past.      Pharmacy of choice is [address]         1) Pt in ED awaiting IP admission  2) Disposition  -  Home with family vs Home with [facility] service  3) Family available to transport at [place]  4) Pt will benefit from PCP f/u at [place]  5) Pt may benefit from [facility] resources at [address] [place] [address] Management Interventions  PCP Verified by CM: Yes  Last Visit to PCP: 01/24/19  Mode of Transport at Discharge:  Other (see comment)(wife to transport at [place])  Transition of Care Consult (CM Consult): Discharge Planning  Discharge Durable Medical Equipment: No  Physical Therapy Consult: No  Occupational Therapy Consult: No  Speech Therapy Consult: No  Current Support Network: Lives with Spouse, Own Home, Family Lives Nearby  Confirm Follow Up Transport: Family  Plan discussed with Pt/Family/Caregiver: Yes(cm spoke with pt at bedside)  Discharge Location  Discharge Placement: Home with family assistance     CM to remain available for support as needed    Cheryle Rilee.  RN, BSN  [facility]  [phone]

## 2019-08-15 NOTE — PROGRESS NOTES
Pharmacy Clarification of the Prior to Admission Medication Regimen Retrospective to the Admission Medication Reconciliation    The patient was interviewed regarding clarification of the prior to admission medication regimen and was questioned regarding use of any other inhalers, topical products, over the counter medications, herbal medications, vitamin products or ophthalmic/nasal/otic medication use. Information Obtained From: Patient    Recommendations/Findings: The following amendments were made to the patient's active medication list on file at [address]:     1) Additions: None    2) Removals: None    3) Changes: None    4) Pertinent Pharmacy Findings:  Per patient he is not taking any prescription medications at this time.      PTA medication list was corrected to the following:     None          Thank you,  Misha Milligan  Medication History Pharmacy Technician

## 2019-08-15 NOTE — ED PROVIDER NOTES
EMERGENCY DEPARTMENT HISTORY AND PHYSICAL EXAM      Date: 8/15/2019  Patient Name: Marija Stapleton  Patient Age and Sex: 75 y.o. male    History of Presenting Illness     Chief Complaint   Patient presents with    Hypotension     Patient brought in as transfer from [facility] due to weakness and sepsis alert       History Provided By: Patient, EMS and Hospital Records    HPI: Marija Stapleton, 75 y.o. male with past medical history as documented below presents to the ED with c/o of intractable nausea and vomiting for 24 hours with associated diarrhea and hypotension. Pt went to OSH and found to be in severe sepsis with shock with likely UTI. He had a fever of 103F, an elevated lactic acid and was given 3L of LR with minimal improvement of BP and thus a central line was placed and vasopressors started. He was given IV zosyn. Pt states he currently still feels very fatigued but nausea improved. He denies any abdominal pain. He does have a history of sinus bradycardia and denies any chest pain, SOB with it. He does endorses rigors and chills yesterday evening at onset of sx's with a fever of 112F but stated his thermometer wasn't working correctly. Pt denies any other alleviating or exacerbating factors. Additionally, pt specifically denies any recent headache, abdominal pain, CP, SOB, lightheadedness, dizziness, numbness, BLE swelling, heart palpitations, urinary sxs, diarrhea, constipation, melena, hematochezia, cough, or congestion. There are no other complaints, changes or physical findings at this time. PCP: Hansa Tenorio    Past History   Past Medical History:  Past Medical History:   Diagnosis Date    Bradycardia     Dyslipidemia     Elevated PSA     Skin lesion of face     UTI (urinary tract infection)        Past Surgical History:  History reviewed. No pertinent surgical history.     Family History:  Family History   Problem Relation Age of Onset    No Known Problems Mother     Alcohol abuse Father  Coronary Artery Disease Neg Hx        Social History:  Social History     Tobacco Use    Smoking status: Former Smoker     Packs/day: 1.00     Years: 5.00     Pack years: 5.00     Types: Cigarettes     Last attempt to quit: 1967     Years since quittin.9    Smokeless tobacco: Never Used   Substance Use Topics    Alcohol use:  Yes     Alcohol/week: 7.0 standard drinks     Types: 7 Glasses of wine per week     Comment: 16 oz tonight    Drug use: No       Allergies:  No Known Allergies    Current Medications:  Current Facility-Administered Medications on File Prior to Encounter   Medication Dose Route Frequency Provider Last Rate Last Dose    [COMPLETED] promethazine (PHENERGAN) 25 mg in 0.9% sodium chloride 50 mL IVPB  25 mg IntraVENous Virginie Chiang MD   Stopped at 08/15/19 0406    [COMPLETED] sodium chloride (NS) flush 5-10 mL  5-10 mL IntraVENous Virginie Chiang MD   10 mL at 08/15/19 0734    [COMPLETED] piperacillin-tazobactam (ZOSYN) 3.375 g in 0.9% sodium chloride (MBP/ADV) 100 mL MBP  3.375 g IntraVENous NOW Virginie Nino MD   Stopped at 08/15/19 0720    [COMPLETED] sodium chloride 0.9 % bolus infusion 250 mL  250 mL IntraVENous Virginie Chiang MD   Stopped at 08/15/19 291 Adelian[address] Kambos 0.9% sodium chloride infusion  150 mL/hr IntraVENous Virginie Chiang MD   Stopped at 08/15/19 0844    [COMPLETED] acetaminophen (TYLENOL) tablet 1,000 mg  1,000 mg Oral Virginie Chiang MD   1,000 mg at 08/15/19 0733    [COMPLETED] ondansetron (ZOFRAN) injection 8 mg  8 mg IntraVENous NOW Virginie Nino MD   8 mg at 08/15/19 3127    [DISCONTINUED] lactated Ringers infusion 1,000 mL  1,000 mL IntraVENous CONTINUOUS Rosey Sen MD   Stopped at 08/15/19 0420    [DISCONTINUED] lactated Ringers infusion 1,000 mL  1,000 mL IntraVENous CONTINUOUS Rosey Sen MD   Stopped at 08/15/19 0525    [DISCONTINUED] NOREPINephrine (LEVOPHED) 8 mg in dextrose 5% 250 mL infusion 2-16 mcg/min IntraVENous TITRATE Ck Almeida MD   Stopped at 08/15/19 5929     No current outpatient medications on file prior to encounter. Review of Systems   Review of Systems   Constitutional: Positive for chills, fatigue and fever. HENT: Negative. Negative for congestion, facial swelling, rhinorrhea, sore throat, trouble swallowing and voice change. Eyes: Negative. Respiratory: Negative. Negative for apnea, cough, chest tightness, shortness of breath and wheezing. Cardiovascular: Negative. Negative for chest pain, palpitations and leg swelling. Gastrointestinal: Positive for diarrhea, nausea and vomiting. Negative for abdominal distention, abdominal pain, blood in stool and constipation. Endocrine: Negative. Negative for cold intolerance, heat intolerance and polyuria. Genitourinary: Negative. Negative for difficulty urinating, dysuria, flank pain, frequency, hematuria and urgency. Musculoskeletal: Negative. Negative for arthralgias, back pain, myalgias, neck pain and neck stiffness. Skin: Negative. Negative for color change and rash. Neurological: Positive for weakness. Negative for dizziness, syncope, facial asymmetry, speech difficulty, light-headedness, numbness and headaches. Hematological: Negative. Does not bruise/bleed easily. Psychiatric/Behavioral: Negative. Negative for confusion and self-injury. The patient is not nervous/anxious. Physical Exam   Physical Exam   Constitutional: He is oriented to person, place, and time. He appears well-developed and well-nourished. He is cooperative. He appears toxic. He has a sickly appearance. He appears ill. He appears distressed. Right IJ CVC in place with vasopressor infusing  Ill-appearing male   HENT:   Head: Normocephalic and atraumatic. Mouth/Throat: Mucous membranes are normal. No posterior oropharyngeal erythema. Eyes: Pupils are equal, round, and reactive to light.  Conjunctivae and EOM are normal.   Neck: Normal range of motion. Cardiovascular: Regular rhythm, normal heart sounds and intact distal pulses. Bradycardia present. Exam reveals no gallop and no friction rub. No murmur heard. Pulmonary/Chest: Effort normal and breath sounds normal. No respiratory distress. He has no wheezes. He has no rales. He exhibits no tenderness. Abdominal: Soft. Bowel sounds are normal. He exhibits no distension and no mass. There is no tenderness. There is no rebound and no guarding. Musculoskeletal: Normal range of motion. He exhibits no edema, tenderness or deformity. Neurological: He is alert and oriented to person, place, and time. He displays normal reflexes. No cranial nerve deficit. He exhibits normal muscle tone. Coordination normal.   Skin: Skin is warm. No rash noted. Psychiatric: He has a normal mood and affect. Nursing note and vitals reviewed. Diagnostic Study Results     Labs -  Recent Results (from the past 24 hour(s))   CBC WITH AUTOMATED DIFF    Collection Time: 08/15/19  3:25 AM   Result Value Ref Range    WBC 5.1 4.1 - 11.1 K/uL    RBC 3.92 (L) 4.10 - 5.70 M/uL    HGB 12.8 12.1 - 17.0 g/dL    HCT 39.1 36.6 - 50.3 %    MCV 99.7 (H) 80.0 - 99.0 FL    MCH 32.7 26.0 - 34.0 PG    MCHC 32.7 30.0 - 36.5 g/dL    RDW 13.5 11.5 - 14.5 %    PLATELET 676 (L) 550 - 400 K/uL    MPV 10.1 8.9 - 12.9 FL    NEUTROPHILS 91 (H) 32 - 75 %    LYMPHOCYTES 6 (L) 12 - 49 %    MONOCYTES 1 (L) 5 - 13 %    EOSINOPHILS 1 0 - 7 %    BASOPHILS 1 0 - 1 %    ABS. NEUTROPHILS 4.7 1.8 - 8.0 K/UL    ABS. LYMPHOCYTES 0.3 (L) 0.8 - 3.5 K/UL    ABS. MONOCYTES 0.0 0.0 - 1.0 K/UL    ABS. EOSINOPHILS 0.0 0.0 - 0.4 K/UL    ABS.  BASOPHILS 0.0 0.0 - 0.1 K/UL   METABOLIC PANEL, COMPREHENSIVE    Collection Time: 08/15/19  3:25 AM   Result Value Ref Range    Sodium 142 136 - 145 mmol/L    Potassium 4.4 3.5 - 5.1 mmol/L    Chloride 105 97 - 108 mmol/L    CO2 26 21 - 32 mmol/L    Anion gap 11 5 - 15 mmol/L    Glucose 92 65 - 100 mg/dL    BUN 13 6 - 20 MG/DL    Creatinine 1.41 (H) 0.70 - 1.30 MG/DL    BUN/Creatinine ratio 9 (L) 12 - 20      GFR est AA 60 (L) >60 ml/min/1.73m2    GFR est non-AA 49 (L) >60 ml/min/1.73m2    Calcium 7.9 (L) 8.5 - 10.1 MG/DL    Bilirubin, total 0.4 0.2 - 1.0 MG/DL    ALT (SGPT) 32 12 - 78 U/L    AST (SGOT) 42 (H) 15 - 37 U/L    Alk.  phosphatase 78 45 - 117 U/L    Protein, total 5.7 (L) 6.4 - 8.2 g/dL    Albumin 3.1 (L) 3.5 - 5.0 g/dL    Globulin 2.6 2.0 - 4.0 g/dL    A-G Ratio 1.2 1.1 - 2.2     LIPASE    Collection Time: 08/15/19  3:25 AM   Result Value Ref Range    Lipase 105 73 - 393 U/L   TROPONIN I    Collection Time: 08/15/19  3:25 AM   Result Value Ref Range    Troponin-I, Qt. <0.05 <0.05 ng/mL   CK W/ CKMB & INDEX    Collection Time: 08/15/19  3:25 AM   Result Value Ref Range    CK 98 39 - 308 U/L    CK - MB <1.0 <3.6 NG/ML    CK-MB Index Cannot be calculated 0.0 - 2.5     ETHYL ALCOHOL    Collection Time: 08/15/19  3:25 AM   Result Value Ref Range    ALCOHOL(ETHYL),SERUM <10 <10 MG/DL   EKG, 12 LEAD, INITIAL    Collection Time: 08/15/19  4:32 AM   Result Value Ref Range    Ventricular Rate 91 BPM    Atrial Rate 91 BPM    P-R Interval 160 ms    QRS Duration 92 ms    Q-T Interval 328 ms    QTC Calculation (Bezet) 403 ms    Calculated P Axis 44 degrees    Calculated R Axis 79 degrees    Calculated T Axis 52 degrees    Diagnosis       Sinus rhythm with premature atrial complexes in a pattern of bigeminy  Low voltage QRS  Borderline ECG  No previous ECGs available  Confirmed by Clare Suazo MD, --- ([id number]) on 8/15/2019 5:49:55 AM     LACTIC ACID    Collection Time: 08/15/19  5:30 AM   Result Value Ref Range    Lactic acid 4.1 (HH) 0.4 - 2.0 MMOL/L   CULTURE, BLOOD    Collection Time: 08/15/19  5:30 AM   Result Value Ref Range    Special Requests: NO SPECIAL REQUESTS      Culture result: NO GROWTH AFTER 7 HOURS     CULTURE, BLOOD    Collection Time: 08/15/19  6:20 AM   Result Value Ref Range    Special Requests: NO SPECIAL REQUESTS      Culture result: NO GROWTH AFTER 7 HOURS     PROTHROMBIN TIME + INR    Collection Time: 08/15/19  6:20 AM   Result Value Ref Range    INR 1.1 0.9 - 1.1      Prothrombin time 10.8 9.0 - 11.1 sec   PTT    Collection Time: 08/15/19  6:20 AM   Result Value Ref Range    aPTT 20.2 (L) 22.1 - 32.0 sec    aPTT, therapeutic range     58.0 - 77.0 SECS   CULTURE, URINE    Collection Time: 08/15/19  6:40 AM   Result Value Ref Range    Special Requests: NO SPECIAL REQUESTS      Eccles Count >100,000  COLONIES/mL        Culture result: GRAM NEGATIVE RODS (A)     URINALYSIS W/ RFLX MICROSCOPIC    Collection Time: 08/15/19  6:40 AM   Result Value Ref Range    Color YELLOW/STRAW      Appearance CLEAR CLEAR      Specific gravity 1.020 1.003 - 1.030      pH (UA) 5.0 5.0 - 8.0      Protein 100 (A) NEG mg/dL    Glucose NEGATIVE  NEG mg/dL    Ketone NEGATIVE  NEG mg/dL    Bilirubin NEGATIVE  NEG      Blood NEGATIVE  NEG      Urobilinogen 0.2 0.2 - 1.0 EU/dL    Nitrites NEGATIVE  NEG      Leukocyte Esterase TRACE (A) NEG      WBC 5-10 0 - 4 /hpf    RBC 0-5 0 - 5 /hpf    Epithelial cells FEW FEW /lpf    Bacteria 2+ (A) NEG /hpf    Mucus 1+ /lpf    UA:UC IF INDICATED URINE CULTURE ORDERED (A) CNI     LACTIC ACID    Collection Time: 08/15/19 11:15 AM   Result Value Ref Range    Lactic acid 3.2 (HH) 0.4 - 2.0 MMOL/L   CBC W/O DIFF    Collection Time: 08/15/19 11:38 AM   Result Value Ref Range    WBC 18.5 (H) 4.1 - 11.1 K/uL    RBC 3.78 (L) 4.10 - 5.70 M/uL    HGB 12.8 12.1 - 17.0 g/dL    HCT 37.4 36.6 - 50.3 %    MCV 98.9 80.0 - 99.0 FL    MCH 33.9 26.0 - 34.0 PG    MCHC 34.2 30.0 - 36.5 g/dL    RDW 13.5 11.5 - 14.5 %    PLATELET 402 (L) 523 - 400 K/uL    MPV 10.1 8.9 - 12.9 FL    NRBC 0.0 0  WBC    ABSOLUTE NRBC 0.00 0.00 - 7.06 K/uL   METABOLIC PANEL, COMPREHENSIVE    Collection Time: 08/15/19 11:38 AM   Result Value Ref Range    Sodium 141 136 - 145 mmol/L    Potassium 4.1 3.5 - 5.1 mmol/L    Chloride 110 (H) 97 - 108 mmol/L    CO2 24 21 - 32 mmol/L    Anion gap 7 5 - 15 mmol/L    Glucose 101 (H) 65 - 100 mg/dL    BUN 16 6 - 20 MG/DL    Creatinine 1.83 (H) 0.70 - 1.30 MG/DL    BUN/Creatinine ratio 9 (L) 12 - 20      GFR est AA 44 (L) >60 ml/min/1.73m2    GFR est non-AA 36 (L) >60 ml/min/1.73m2    Calcium 7.9 (L) 8.5 - 10.1 MG/DL    Bilirubin, total 0.6 0.2 - 1.0 MG/DL    ALT (SGPT) 29 12 - 78 U/L    AST (SGOT) 34 15 - 37 U/L    Alk. phosphatase 54 45 - 117 U/L    Protein, total 6.1 (L) 6.4 - 8.2 g/dL    Albumin 3.2 (L) 3.5 - 5.0 g/dL    Globulin 2.9 2.0 - 4.0 g/dL    A-G Ratio 1.1 1.1 - 2.2         Radiologic Studies -   XR CHEST PORT    (Results Pending)     CT Results  (Last 48 hours)               08/15/19 0522  CT ABD PELV WO CONT Final result    Impression:  IMPRESSION:   No acute findings. Narrative:  EXAM: CT ABD PELV WO CONT       INDICATION: Diarrhea side and onset of vomiting and diarrhea, 12 episode   starting 7 hours ago       COMPARISON: None       CONTRAST:  None. TECHNIQUE:    Thin axial images were obtained through the abdomen and pelvis. Coronal and   sagittal reconstructions were generated. Oral contrast was not administered. CT   dose reduction was achieved through use of a standardized protocol tailored for   this examination and automatic exposure control for dose modulation. The absence of intravenous contrast material reduces the sensitivity for   evaluation of the solid parenchymal organs of the abdomen. FINDINGS:    LUNG BASES: Clear. INCIDENTALLY IMAGED HEART AND MEDIASTINUM: Unremarkable. LIVER: No mass or biliary dilatation. GALLBLADDER: Unremarkable. SPLEEN: No mass. PANCREAS: No mass or ductal dilatation. ADRENALS: Unremarkable. KIDNEYS/URETERS: Left renal cyst.   STOMACH: Unremarkable. SMALL BOWEL: No dilatation or wall thickening. COLON: No dilatation or wall thickening. APPENDIX: Unremarkable. PERITONEUM: No ascites or pneumoperitoneum. RETROPERITONEUM: No lymphadenopathy or aortic aneurysm. REPRODUCTIVE ORGANS: Enlarged prostate. URINARY BLADDER: No mass or calculus. BONES: Degenerative changes L4-S1. ADDITIONAL COMMENTS: Right inguinal hernia. CXR Results  (Last 48 hours)               08/15/19 0733  XR CHEST PORT Final result    Impression:  IMPRESSION: Satisfactory line placement. Narrative:  INDICATION:  Line placement       EXAM: CXR Portable. 0716 hours       FINDINGS: Portable chest shows right IJ CVL placed with tip in the SVC without   pneumothorax or other change since 0625 hours. 08/15/19 0643  XR CHEST PORT Final result    Impression:  IMPRESSION: No acute cardiopulmonary findings. Narrative:  EXAM: XR CHEST PORT       INDICATION: sepsis. Also left subclavian line attempt       COMPARISON: January 25       FINDINGS: A portable AP radiograph of the chest was obtained at 0625 hours. The   patient is on a cardiac monitor. The lungs are clear. There is atherosclerosis   of the aorta. The cardiac and mediastinal contours and pulmonary vascularity are   normal.  The bones and soft tissues are grossly within normal limits. Medical Decision Making   I am the first provider for this patient. I reviewed the vital signs, available nursing notes, past medical history, past surgical history, family history and social history. Vital Signs-Reviewed the patient's vital signs.   Patient Vitals for the past 24 hrs:   Temp Pulse Resp BP SpO2   08/15/19 1600  (!) 54 17 107/57 97 %   08/15/19 1545  (!) 52 20 98/52 95 %   08/15/19 1530  (!) 53 19 92/48 94 %   08/15/19 1515  (!) 56 18 98/57 92 %   08/15/19 1500  (!) 58 19 102/58 94 %   08/15/19 1454  66 21 114/62 92 %   08/15/19 1433  (!) 59 20 102/59 94 %   08/15/19 1340 97.9 °F (36.6 °C) 60 19 104/66 93 %   08/15/19 1330  63 18 95/68 92 %   08/15/19 1320  (!) 56 19 93/55 93 %   08/15/19 1300  (!) 57 18 99/59 91 %   08/15/19 1250  69 22 101/63 95 %   08/15/19 1240  60 19 94/53 91 %   08/15/19 1230  66 19 (!) 84/45 91 %   08/15/19 1220  (!) 55 18 94/48 91 %   08/15/19 1210  (!) 57 19 109/65 93 %   08/15/19 1200  68 17 100/51 92 %   08/15/19 1150  (!) 57 18 92/52 93 %   08/15/19 1140  73 20 (!) 79/57 95 %   08/15/19 1130  67 15 (!) 82/69 92 %   08/15/19 1122  68 18 (!) 84/62 91 %   08/15/19 1029  70 20 (!) 79/48 92 %   08/15/19 1024 97.8 °F (36.6 °C) 68 16 (!) 68/58 90 %       Pulse Oximetry Analysis - 90% on RA    Cardiac Monitor:   Rate: 68 bpm  Rhythm: Normal Sinus Rhythm      Records Reviewed: Nursing Notes, Old Medical Records, Previous electrocardiograms, Previous Radiology Studies and Previous Laboratory Studies    ED EKG interpretation:  Rhythm: normal sinus rhythm and PAC's; and regular . Rate (approx.): 91; Axis: normal; P wave: normal; QRS interval: normal ; ST/T wave: normal; Other findings: normal. This EKG was interpreted by Shayy Asif M.D. Provider Notes (Medical Decision Making):   Patient presents with fever, tachycardia and concerns for infection. Most likely UTI  DDx: sepsis 2/2 UTI, PNA, intraabdominal infection (colitis, appendicitis, cholecystitis),  infectious diarrhea, meningitis, soft tissue infection, septic arthritis, flu/viral prodrome. Will follow sepsis protocol and order set by providing IVF resuscitation, obtaining blood and urine cultures, antibiotics, labs, lactate, EKG and frequently reassessing hemodynamic status on the patient. Will hold off on aggressive fluid hydration unless patient shows signs of severe sepsis or shock. ED Course:   Initial assessment performed. The patients presenting problems have been discussed, and they are in agreement with the care plan formulated and outlined with them. I have encouraged them to ask questions as they arise throughout their visit.     I reviewed our electronic medical record system for any past medical records that were available that may contribute to the patient's current condition, the nursing notes and vital signs from today's visit. Julio De Leon MD    Orders Placed During ED Course:  Orders Placed This Encounter    CULTURE, URINE    XR CHEST PORT    LACTIC ACID    CBC W/O DIFF    METABOLIC PANEL, COMPREHENSIVE    CBC W/O DIFF    METABOLIC PANEL, COMPREHENSIVE    METABOLIC PANEL, BASIC    LACTIC ACID    CBC WITH AUTOMATED DIFF    METABOLIC PANEL, COMPREHENSIVE    MAGNESIUM    PHOSPHORUS    DIET REGULAR 3-4 GM ([place])    BLADDER CHECKS    VITAL SIGNS PER UNIT ROUTINE    BEDREST, COMPLETE    NOTIFY PROVIDER: VITAL SIGNS CHANGES    INTAKE AND OUTPUT    FULL CODE    OXIMETRY, SPOT CHECK    NOREPINephrine (LEVOPHED) 8 mg in 5% dextrose 250mL infusion    0.9% sodium chloride infusion    albumin human 25% (BUMINATE) solution 25 g    sodium chloride (NS) flush 5-40 mL    sodium chloride (NS) flush 5-40 mL    acetaminophen (TYLENOL) tablet 650 mg    ondansetron (ZOFRAN) injection 4 mg    heparin (porcine) injection 5,000 Units    famotidine (PF) (PEPCID) injection 20 mg    cefepime (MAXIPIME) 2 g in 0.9% sodium chloride (MBP/ADV) 100 mL    metroNIDAZOLE (FLAGYL) IVPB premix 500 mg    vancomycin (VANCOCIN) 2000 mg in  ml infusion    vancomycin (VANCOCIN) 1500 mg in  ml infusion    sodium chloride 0.9 % bolus infusion 1,000 mL    IP CONSULT TO HOSPITALIST    INITIAL PHYSICIAN ORDER: INPATIENT Intensive Care; 3.  Patient receiving treatment that can only be provided in an inpatient setting (further clarification in H&P documentation)   [address] [place] [address] to Dose Consult     Medications Administered:  Medications   NOREPINephrine (LEVOPHED) 8 mg in 5% dextrose 250mL infusion (18 mcg/min IntraVENous Rate Change 8/15/19 1411)   0.9% sodium chloride infusion (150 mL/hr IntraVENous New Bag 8/15/19 1133)   sodium chloride (NS) flush 5-40 mL (10 mL IntraVENous Given 8/15/19 1342)   sodium chloride (NS) flush 5-40 mL (10 mL IntraVENous Given 8/15/19 1342)   acetaminophen (TYLENOL) tablet 650 mg (has no administration in time range)   ondansetron (ZOFRAN) injection 4 mg (has no administration in time range)   heparin (porcine) injection 5,000 Units (5,000 Units SubCUTAneous Given 8/15/19 1200)   famotidine (PF) (PEPCID) injection 20 mg (20 mg IntraVENous Given 8/15/19 1200)   cefepime (MAXIPIME) 2 g in 0.9% sodium chloride (MBP/ADV) 100 mL (2 g IntraVENous New Bag 8/15/19 1212)   metroNIDAZOLE (FLAGYL) IVPB premix 500 mg (500 mg IntraVENous New Bag 8/15/19 1248)   vancomycin (VANCOCIN) 1500 mg in  ml infusion (has no administration in time range)   albumin human 25% (BUMINATE) solution 25 g (25 g IntraVENous New Bag 8/15/19 1128)   vancomycin (VANCOCIN) 2000 mg in  ml infusion (2,000 mg IntraVENous New Bag 8/15/19 1356)   sodium chloride 0.9 % bolus infusion 1,000 mL (1,000 mL IntraVENous New Bag 8/15/19 1248)     Progress Note:  BP upon arrival 68/58, levophed titrated up currently. Progress Note:  Pt reassessed, BP 92/52, repeat lactic acid sent, initial lactic acid > 4.0    Progress Note:  Cr 1.4, previous 0.9, thus in acute renal failure, reviewed cardiac history, h/o chronic sinus fab 40-50's, seen Dr. Shirley Harden in past, negative stress with ECHO 74% grade 1 diastolic dysfunction, continue fluid resuscitation as tolerate    Progress Note:  Charge RN called [facility] to add urine culture to initial UA sent. Levophed titrated up to 15mcg/kg/min, remains hypotensive, IV vanc, flagyl, cefepime added to broaden coverage    Progress Note:  BP improved to 127/94, albumin also given. Consult Note:  Stanford Luna MD spoke with  ,   Specialty: Hospitalist  Discussed pt's hx, disposition, and available diagnostic and imaging results. Reviewed care plans. Agree with management and plan thus far. Consultant will evaluate pt for admission.     CRITICAL CARE NOTE :    IMPENDING DETERIORATION -Cardiovascular, Metabolic and Renal  ASSOCIATED RISK FACTORS - Hypotension, Shock, Hypoxia and Metabolic changes  MANAGEMENT- Bedside Assessment and Supervision of Care  INTERPRETATION -  Xrays, ECG, Blood Pressure and Cardiac Output Measures   INTERVENTIONS - hemodynamic mngmt, Metobolic interventions and management of persistent shock requiring broadening of IV abx, titration of vasopressors, continued albumin and fluid resuscitation   CASE REVIEW - Hospitalist, Nursing and Family  TREATMENT RESPONSE -Improved  PERFORMED BY - Self    NOTES   :    I have spent 55 minutes of critical care time involved in lab review, consultations with specialist, family decision- making, bedside attention and documentation. During this entire length of time I was immediately available to the patient . Critical Care: The reason for providing this level of medical care for this critically ill patient was due to a critical illness that impaired one or more vital organ systems, such that there was a high probability of imminent or life threatening deterioration in the patient's condition. This care involved high complexity decision making to assess, manipulate, and support vital system functions, to treat this degree of vital organ system failure, and to prevent further life threatening deterioration of the patients condition. Danika Sandhu MD    Disposition:  ADMIT  Patient is being admitted to the hospital.  The results of their tests and reasons for their admission have been discussed with them and/or available family. They convey agreement and understanding for the need to be admitted and for their admission diagnosis. Consultation has been made with the inpatient physician specialist for hospitalization. Diagnosis     Clinical Impression:   1. Septic shock ([address]    2. Severe sepsis ([address]    3. Acute renal failure, unspecified acute renal failure type ([address]    4. Urinary tract infection without hematuria, site unspecified    5.  Lactic acidosis 6. Sinus bradycardia        Attestation:  I personally performed the services described in this documentation on this date 8/15/2019 for patient, Felisha Neri. Clarke Osorio MD    Please note that this dictation was completed with [employer], the computer voice recognition software. Quite often unanticipated grammatical, syntax, homophones, and other interpretive errors are inadvertently transcribed by the computer software. Please disregard these errors. Please excuse any errors that have escaped final proofreading. This note will not be viewable in [address]

## 2019-08-15 NOTE — ROUTINE PROCESS
TRANSFER - OUT REPORT:    Verbal report given to [facility] RN(name) on Geetha Lanza  being transferred to ICU(unit) for routine progression of care       Report consisted of patients Situation, Background, Assessment and   Recommendations(SBAR). Information from the following report(s) SBAR, Kardex, ED Summary, [facility] and Recent Results was reviewed with the receiving nurse. Lines:       Opportunity for questions and clarification was provided.       Patient transported with:   Monitor  O2 @ 4 liters  Registered Nurse

## 2019-08-15 NOTE — PROGRESS NOTES
Pharmacy Automatic Renal Dosing Protocol - Antimicrobials    Indication for Antimicrobials: UTI; septic shock    Current Regimen of Each Antimicrobial:  Cefepime 2 gm IV q8h (Start Date 8/15; Day # 1/)  Flagyl 500 mg IV q8h (Start Date 8/15; Day # 1)  Vancomycin IV - pharmacy to dose - Start Date 8/15; Day # 1)    Previous Antimicrobial Therapy:    Date Dose & Interval Measured (mcg/mL) Extrapolated (mcg/mL)                       Vancomycin Tough Goal:  ~15-20 or -600    Significant Cultures:   8/15 - blood - pending  8/15 - urine - pending    Radiology / Imaging results: (X-ray, CT scan or MRI):   8/15 - CT abdomen - no acute findings      Labs:  Recent Labs     08/15/19  1138 08/15/19  0325   CREA 1.83* 1.41*   BUN 16 13   WBC 18.5* 5.1     Temp (24hrs), Av °F (37.8 °C), Min:97.8 °F (36.6 °C), Max:103.5 °F (39.7 °C)    Paralysis, amputations, malnutrition: n/a  Creatinine Clearance (mL/min) or Dialysis: 39 ml/min    Impression/Plan:   Adjusted cefepime to 2 gm IV q12h for renal function  Flagyl adjusted to 500 mg IV q12h per protocol  Will start vancomycin 2000 mg IV load, followed by 1500 mg IV q24h for expected trough of ~16 (AUC 58). CMP in am.  Pharmacy to adjust dose based on scr if needed. Antimicrobial stop date 7 days cefepime; none for flagylmaritao     Pharmacy will follow daily and adjust medications as appropriate for renal function and/or serum levels.     Thank you,  Yahaira Padron, [facility]

## 2019-08-15 NOTE — PROGRESS NOTES
Pharmacy Automatic Renal Dosing Protocol - Antimicrobials    Indication for Antimicrobials: UTI    Current Regimen of Each Antimicrobial:  Cefepime 2 gm IV q8h (Start Date 8/15; Day # 1)    Previous Antimicrobial Therapy:      Date Dose & Interval Measured (mcg/mL) Extrapolated (mcg/mL)                         Significant Cultures:   8/15 - blood - pending  8/15 - urine - pending    Radiology / Imaging results: (X-ray, CT scan or MRI):   8/15 - CT abdomen - no acute findings    Paralysis, amputations, malnutrition: n/a    Labs:  Recent Labs     08/15/19  0325   CREA 1.41*   BUN 13   WBC 5.1     Temp (24hrs), Av.4 °F (38 °C), Min:97.8 °F (36.6 °C), Max:103.5 °F (39.7 °C)    Creatinine Clearance (mL/min) or Dialysis: ~50 ml/min    Impression/Plan:   · Adjusted cefepime to 2 gm IV q12h for renal function  · Flagyl adjusted to 500 mg IV q12h per protocol  · Will start vancomycin 2000 mg IV load, followed by 1500 mg IV q18h for expected trough of ~17. CMP in am.  Pharmacy to adjust dose based on scr if needed. · Antimicrobial stop date 7 days cefepime     Pharmacy will follow daily and adjust medications as appropriate for renal function and/or serum levels.     Thank you,  Jm Addison, PHARMD

## 2019-08-16 ENCOUNTER — APPOINTMENT (OUTPATIENT)
Dept: GENERAL RADIOLOGY | Age: 74
DRG: 871 | End: 2019-08-16
Attending: INTERNAL MEDICINE
Payer: MEDICARE

## 2019-08-16 LAB
ALBUMIN SERPL-MCNC: 2.9 G/DL (ref 3.5–5)
ALBUMIN/GLOB SERPL: 0.9 {RATIO} (ref 1.1–2.2)
ALP SERPL-CCNC: 64 U/L (ref 45–117)
ALT SERPL-CCNC: 31 U/L (ref 12–78)
ANION GAP SERPL CALC-SCNC: 6 MMOL/L (ref 5–15)
AST SERPL-CCNC: 31 U/L (ref 15–37)
BASOPHILS # BLD: 0 K/UL (ref 0–0.1)
BASOPHILS NFR BLD: 0 % (ref 0–1)
BILIRUB SERPL-MCNC: 0.9 MG/DL (ref 0.2–1)
BUN SERPL-MCNC: 15 MG/DL (ref 6–20)
BUN/CREAT SERPL: 11 (ref 12–20)
CALCIUM SERPL-MCNC: 7.3 MG/DL (ref 8.5–10.1)
CHLORIDE SERPL-SCNC: 114 MMOL/L (ref 97–108)
CO2 SERPL-SCNC: 23 MMOL/L (ref 21–32)
CREAT SERPL-MCNC: 1.33 MG/DL (ref 0.7–1.3)
DIFFERENTIAL METHOD BLD: ABNORMAL
EOSINOPHIL # BLD: 0 K/UL (ref 0–0.4)
EOSINOPHIL NFR BLD: 0 % (ref 0–7)
ERYTHROCYTE [DISTWIDTH] IN BLOOD BY AUTOMATED COUNT: 13.6 % (ref 11.5–14.5)
GLOBULIN SER CALC-MCNC: 3.1 G/DL (ref 2–4)
GLUCOSE SERPL-MCNC: 121 MG/DL (ref 65–100)
HCT VFR BLD AUTO: 35.3 % (ref 36.6–50.3)
HGB BLD-MCNC: 12 G/DL (ref 12.1–17)
IMM GRANULOCYTES # BLD AUTO: 0 K/UL (ref 0–0.04)
IMM GRANULOCYTES NFR BLD AUTO: 0 % (ref 0–0.5)
LACTATE SERPL-SCNC: 1.7 MMOL/L (ref 0.4–2)
LYMPHOCYTES # BLD: 1.9 K/UL (ref 0.8–3.5)
LYMPHOCYTES NFR BLD: 9 % (ref 12–49)
MAGNESIUM SERPL-MCNC: 1.6 MG/DL (ref 1.6–2.4)
MCH RBC QN AUTO: 33.9 PG (ref 26–34)
MCHC RBC AUTO-ENTMCNC: 34 G/DL (ref 30–36.5)
MCV RBC AUTO: 99.7 FL (ref 80–99)
METAMYELOCYTES NFR BLD MANUAL: 1 %
MONOCYTES # BLD: 0.4 K/UL (ref 0–1)
MONOCYTES NFR BLD: 2 % (ref 5–13)
NEUTS BAND NFR BLD MANUAL: 3 %
NEUTS SEG # BLD: 18.5 K/UL (ref 1.8–8)
NEUTS SEG NFR BLD: 85 % (ref 32–75)
NRBC # BLD: 0 K/UL (ref 0–0.01)
NRBC BLD-RTO: 0 PER 100 WBC
PHOSPHATE SERPL-MCNC: 1.9 MG/DL (ref 2.6–4.7)
PLATELET # BLD AUTO: 121 K/UL (ref 150–400)
PMV BLD AUTO: 10.2 FL (ref 8.9–12.9)
POTASSIUM SERPL-SCNC: 4.4 MMOL/L (ref 3.5–5.1)
PROT SERPL-MCNC: 6 G/DL (ref 6.4–8.2)
RBC # BLD AUTO: 3.54 M/UL (ref 4.1–5.7)
RBC MORPH BLD: ABNORMAL
SODIUM SERPL-SCNC: 143 MMOL/L (ref 136–145)
WBC # BLD AUTO: 21 K/UL (ref 4.1–11.1)

## 2019-08-16 PROCEDURE — 74011250637 HC RX REV CODE- 250/637: Performed by: HOSPITALIST

## 2019-08-16 PROCEDURE — 74011000250 HC RX REV CODE- 250: Performed by: HOSPITALIST

## 2019-08-16 PROCEDURE — 93005 ELECTROCARDIOGRAM TRACING: CPT

## 2019-08-16 PROCEDURE — 74011000258 HC RX REV CODE- 258: Performed by: INTERNAL MEDICINE

## 2019-08-16 PROCEDURE — 74011250636 HC RX REV CODE- 250/636: Performed by: HOSPITALIST

## 2019-08-16 PROCEDURE — 83735 ASSAY OF MAGNESIUM: CPT

## 2019-08-16 PROCEDURE — 74011250636 HC RX REV CODE- 250/636: Performed by: INTERNAL MEDICINE

## 2019-08-16 PROCEDURE — 80053 COMPREHEN METABOLIC PANEL: CPT

## 2019-08-16 PROCEDURE — 85025 COMPLETE CBC W/AUTO DIFF WBC: CPT

## 2019-08-16 PROCEDURE — 77010033678 HC OXYGEN DAILY

## 2019-08-16 PROCEDURE — 36415 COLL VENOUS BLD VENIPUNCTURE: CPT

## 2019-08-16 PROCEDURE — 71045 X-RAY EXAM CHEST 1 VIEW: CPT

## 2019-08-16 PROCEDURE — 74011000250 HC RX REV CODE- 250: Performed by: EMERGENCY MEDICINE

## 2019-08-16 PROCEDURE — 74011000258 HC RX REV CODE- 258: Performed by: HOSPITALIST

## 2019-08-16 PROCEDURE — 84100 ASSAY OF PHOSPHORUS: CPT

## 2019-08-16 PROCEDURE — 65610000006 HC RM INTENSIVE CARE

## 2019-08-16 RX ORDER — METOPROLOL TARTRATE 25 MG/1
12.5 TABLET, FILM COATED ORAL EVERY 12 HOURS
Status: DISCONTINUED | OUTPATIENT
Start: 2019-08-16 | End: 2019-08-16

## 2019-08-16 RX ORDER — AMIODARONE HCL/D5W 450 MG/250
0.5-1 PLASTIC BAG, INJECTION (ML) INTRAVENOUS
Status: DISCONTINUED | OUTPATIENT
Start: 2019-08-16 | End: 2019-08-17

## 2019-08-16 RX ORDER — THIAMINE HYDROCHLORIDE 100 MG/ML
100 INJECTION, SOLUTION INTRAMUSCULAR; INTRAVENOUS
Status: DISCONTINUED | OUTPATIENT
Start: 2019-08-16 | End: 2019-08-16

## 2019-08-16 RX ORDER — ENOXAPARIN SODIUM 150 MG/ML
120 INJECTION SUBCUTANEOUS EVERY 12 HOURS
Status: DISCONTINUED | OUTPATIENT
Start: 2019-08-16 | End: 2019-08-18

## 2019-08-16 RX ADMIN — Medication 1 AMPULE: at 08:16

## 2019-08-16 RX ADMIN — ENOXAPARIN SODIUM 120 MG: 120 INJECTION SUBCUTANEOUS at 19:14

## 2019-08-16 RX ADMIN — Medication 10 ML: at 13:10

## 2019-08-16 RX ADMIN — HEPARIN SODIUM 5000 UNITS: 5000 INJECTION INTRAVENOUS; SUBCUTANEOUS at 12:08

## 2019-08-16 RX ADMIN — ACETAMINOPHEN 650 MG: 325 TABLET ORAL at 17:21

## 2019-08-16 RX ADMIN — SODIUM CHLORIDE 1000 ML: 900 INJECTION, SOLUTION INTRAVENOUS at 09:11

## 2019-08-16 RX ADMIN — Medication 10 ML: at 21:01

## 2019-08-16 RX ADMIN — SODIUM CHLORIDE 150 ML/HR: 900 INJECTION, SOLUTION INTRAVENOUS at 10:34

## 2019-08-16 RX ADMIN — AMIODARONE HYDROCHLORIDE 1 MG/MIN: 50 INJECTION, SOLUTION INTRAVENOUS at 18:15

## 2019-08-16 RX ADMIN — HEPARIN SODIUM 5000 UNITS: 5000 INJECTION INTRAVENOUS; SUBCUTANEOUS at 04:17

## 2019-08-16 RX ADMIN — CEFEPIME HYDROCHLORIDE 2 G: 2 INJECTION, POWDER, FOR SOLUTION INTRAVENOUS at 12:08

## 2019-08-16 RX ADMIN — SODIUM CHLORIDE 150 ML/HR: 900 INJECTION, SOLUTION INTRAVENOUS at 23:38

## 2019-08-16 RX ADMIN — Medication 10 ML: at 05:01

## 2019-08-16 RX ADMIN — FAMOTIDINE 20 MG: 10 INJECTION, SOLUTION INTRAVENOUS at 20:01

## 2019-08-16 RX ADMIN — Medication 14 MCG/MIN: at 03:57

## 2019-08-16 RX ADMIN — SODIUM CHLORIDE 150 ML/HR: 900 INJECTION, SOLUTION INTRAVENOUS at 17:21

## 2019-08-16 RX ADMIN — Medication 2 MCG/MIN: at 21:31

## 2019-08-16 RX ADMIN — THIAMINE HYDROCHLORIDE 100 MG: 100 INJECTION, SOLUTION INTRAMUSCULAR; INTRAVENOUS at 17:58

## 2019-08-16 RX ADMIN — Medication 1 AMPULE: at 20:00

## 2019-08-16 RX ADMIN — CEFEPIME HYDROCHLORIDE 2 G: 2 INJECTION, POWDER, FOR SOLUTION INTRAVENOUS at 01:04

## 2019-08-16 RX ADMIN — VANCOMYCIN HYDROCHLORIDE 1000 MG: 1 INJECTION, POWDER, LYOPHILIZED, FOR SOLUTION INTRAVENOUS at 07:59

## 2019-08-16 RX ADMIN — SODIUM CHLORIDE 150 ML/HR: 900 INJECTION, SOLUTION INTRAVENOUS at 06:59

## 2019-08-16 NOTE — PROGRESS NOTES
[facility]  Pulmonary, Critical Care, and Sleep Medicine    Name: Fern De MRN: [id number]   : 1945 Hospital: [facility]   Date: 2019        Critical Care Patient Consult    IMPRESSION:   · Severe Sepsis and Shock, WBC of 18.5.   · Hgb of 12.8. · Possible Acute UTI, Gram negatives noted on Urine Cx. · Fevers, Rigors. · MIld Acute renal insufficiency, Cr of 1.8. · Sinus Bradycardia with HR of 40-50s. · BPH and Elevated PSA. · Desires to be full code. · Critically ill due to need for vasopressors and severe UTI. 35 min CC, EOP. RECOMMENDATIONS:   · On Cefepime, Flagyl, and Vanc.-Will stop the flagyl and vanc. · Will need to follow Cx. · Pressors to Keep MAP over 65. On levophed. Wean as tolerated. · IV hydration, will give additional saline bolus. · May need further  evaluation. · Serial labs. Subjective/History:     Last 24 hrs: No new chest pain, no back pain, no abdominal pain. NO headache. No dizziness. This patient has been seen and evaluated at the request of Dr. Ena Felix for above. Patient is a 75 y.o. male who went to bed last pm in his normal state of health. Awoke with rigors. He also noted that he had vomiting and diarrhea about 12 times. He was noted by his wife to have a low blood pressure. Per wife's report he had decrease po intake for several days. Has been feeling very fatigued over last 18 hrs. While in ER was noted to have low blood pressures. Had CVC placed and was placed on levophed. Has been passing his urine since in ER. He was seen in the ICU and reports he is feeling better. Past Medical History:   Diagnosis Date    Bradycardia     Dyslipidemia     Elevated PSA     Skin lesion of face     UTI (urinary tract infection)       History reviewed. No pertinent surgical history.    Prior to Admission medications    Not on File     Current Facility-Administered Medications Medication Dose Route Frequency    sodium chloride 0.9 % bolus infusion 1,000 mL  1,000 mL IntraVENous ONCE    NOREPINephrine (LEVOPHED) 8 mg in 5% dextrose 250mL infusion  2-90 mcg/min IntraVENous TITRATE    0.9% sodium chloride infusion  150 mL/hr IntraVENous CONTINUOUS    sodium chloride (NS) flush 5-40 mL  5-40 mL IntraVENous Q8H    heparin (porcine) injection 5,000 Units  5,000 Units SubCUTAneous Q8H    cefepime (MAXIPIME) 2 g in 0.9% sodium chloride (MBP/ADV) 100 mL  2 g IntraVENous Q12H    alcohol 62% (NOZIN) nasal  1 Ampule  1 Ampule Topical Q12H    famotidine (PF) (PEPCID) 20 mg in sodium chloride 0.9% 10 mL injection  20 mg IntraVENous Q24H     No Known Allergies   Social History     Tobacco Use    Smoking status: Former Smoker     Packs/day: 1.00     Years: 5.00     Pack years: 5.00     Types: Cigarettes     Last attempt to quit: 1967     Years since quittin.9    Smokeless tobacco: Never Used   Substance Use Topics    Alcohol use:  Yes     Alcohol/week: 7.0 standard drinks     Types: 7 Glasses of wine per week     Comment: 16 oz tonight      Family History   Problem Relation Age of Onset    No Known Problems Mother     Alcohol abuse Father     Coronary Artery Disease Neg Hx         Review of Systems:  Constitutional: positive for fatigue  Eyes: negative  Ears, nose, mouth, throat, and face: negative  Respiratory: negative  Cardiovascular: negative  Gastrointestinal: positive for nausea, vomiting and diarrhea  Genitourinary:positive for dysuria  Integument/breast: negative  Hematologic/lymphatic: negative  Musculoskeletal:negative  Neurological: negative  Behavioral/Psych: negative  Endocrine: negative  Allergic/Immunologic: negative    Objective:   Vital Signs:    Visit Vitals  /62   Pulse (!) 56   Temp 98.4 °F (36.9 °C)   Resp 20   Ht 6' (1.829 m)   Wt 112.7 kg (248 lb 7.3 oz)   SpO2 96%   BMI 33.70 kg/m²       O2 Device: Nasal cannula   O2 Flow Rate (L/min): 2 l/min   Temp (24hrs), Av.3 °F (36.8 °C), Min:97.8 °F (36.6 °C), Max:99.3 °F (37.4 °C)       Intake/Output:   Last shift:      701 - 1900  In: 418 [I.V.:418]  Out: 75 [Urine:75]  Last 3 shifts: 1901 -  0700  In: 5523.9 [I.V.:5523.9]  Out: 2430 [Urine:2430]    Intake/Output Summary (Last 24 hours) at 2019 0907  Last data filed at 2019 0817  Gross per 24 hour   Intake 5941.93 ml   Output 2505 ml   Net 3436.93 ml     Hemodynamics:   PAP:   CO:     Wedge:   CI:     CVP:    SVR:       PVR:       Ventilator Settings:  Mode Rate Tidal Volume Pressure FiO2 PEEP                    Peak airway pressure:      Minute ventilation:        Physical Exam:    General:  Alert, cooperative, no distress, appears stated age. Conversant. NO distress. IJ CVC in place on right side. Head:  Normocephalic, without obvious abnormality, atraumatic. Eyes:  Conjunctivae/corneas clear. PERRL, EOMs intact. Nose: Nares normal. Septum midline. Mucosa normal. No drainage or sinus tenderness. Throat: Lips, mucosa, and tongue normal. Teeth and gums normal.   Neck: Supple, symmetrical, trachea midline, no adenopathy, thyroid: no enlargment/tenderness/nodules, no carotid bruit and no JVD. Back:   Symmetric, no curvature. ROM normal.   Lungs:   Clear to auscultation bilaterally. Chest wall:  No tenderness or deformity. Heart:  Regular rate and rhythm, S1, S2 normal, no murmur, click, rub or gallop. Abdomen:   Soft, non-tender. Bowel sounds normal. No masses,  No organomegaly. Obese. Extremities: Extremities normal, atraumatic, no cyanosis or edema. Pulses: 2+ and symmetric all extremities. Skin: Skin color, texture, turgor normal. No rashes or lesions   Lymph nodes: Cervical, supraclavicular, and axillary nodes normal.   Neurologic: Grossly nonfocal, has good strength of BUE and BLE. Psych: NO anxiety or no depression.         Data:     Recent Results (from the past 24 hour(s))   LACTIC ACID Collection Time: 08/15/19 11:15 AM   Result Value Ref Range    Lactic acid 3.2 (HH) 0.4 - 2.0 MMOL/L   CBC W/O DIFF    Collection Time: 08/15/19 11:38 AM   Result Value Ref Range    WBC 18.5 (H) 4.1 - 11.1 K/uL    RBC 3.78 (L) 4.10 - 5.70 M/uL    HGB 12.8 12.1 - 17.0 g/dL    HCT 37.4 36.6 - 50.3 %    MCV 98.9 80.0 - 99.0 FL    MCH 33.9 26.0 - 34.0 PG    MCHC 34.2 30.0 - 36.5 g/dL    RDW 13.5 11.5 - 14.5 %    PLATELET 187 (L) 696 - 400 K/uL    MPV 10.1 8.9 - 12.9 FL    NRBC 0.0 0  WBC    ABSOLUTE NRBC 0.00 0.00 - 9.64 K/uL   METABOLIC PANEL, COMPREHENSIVE    Collection Time: 08/15/19 11:38 AM   Result Value Ref Range    Sodium 141 136 - 145 mmol/L    Potassium 4.1 3.5 - 5.1 mmol/L    Chloride 110 (H) 97 - 108 mmol/L    CO2 24 21 - 32 mmol/L    Anion gap 7 5 - 15 mmol/L    Glucose 101 (H) 65 - 100 mg/dL    BUN 16 6 - 20 MG/DL    Creatinine 1.83 (H) 0.70 - 1.30 MG/DL    BUN/Creatinine ratio 9 (L) 12 - 20      GFR est AA 44 (L) >60 ml/min/1.73m2    GFR est non-AA 36 (L) >60 ml/min/1.73m2    Calcium 7.9 (L) 8.5 - 10.1 MG/DL    Bilirubin, total 0.6 0.2 - 1.0 MG/DL    ALT (SGPT) 29 12 - 78 U/L    AST (SGOT) 34 15 - 37 U/L    Alk.  phosphatase 54 45 - 117 U/L    Protein, total 6.1 (L) 6.4 - 8.2 g/dL    Albumin 3.2 (L) 3.5 - 5.0 g/dL    Globulin 2.9 2.0 - 4.0 g/dL    A-G Ratio 1.1 1.1 - 2.2     LACTIC ACID    Collection Time: 08/15/19  5:48 PM   Result Value Ref Range    Lactic acid 2.9 (HH) 0.4 - 2.0 MMOL/L   LACTIC ACID    Collection Time: 08/15/19 11:37 PM   Result Value Ref Range    Lactic acid 1.7 0.4 - 2.0 MMOL/L   CBC WITH AUTOMATED DIFF    Collection Time: 08/16/19  4:30 AM   Result Value Ref Range    WBC 21.0 (H) 4.1 - 11.1 K/uL    RBC 3.54 (L) 4.10 - 5.70 M/uL    HGB 12.0 (L) 12.1 - 17.0 g/dL    HCT 35.3 (L) 36.6 - 50.3 %    MCV 99.7 (H) 80.0 - 99.0 FL    MCH 33.9 26.0 - 34.0 PG    MCHC 34.0 30.0 - 36.5 g/dL    RDW 13.6 11.5 - 14.5 %    PLATELET 833 (L) 641 - 400 K/uL    MPV 10.2 8.9 - 12.9 FL    NRBC 0.0 0  WBC    ABSOLUTE NRBC 0.00 0.00 - 0.01 K/uL    NEUTROPHILS 85 (H) 32 - 75 %    BAND NEUTROPHILS 3 %    LYMPHOCYTES 9 (L) 12 - 49 %    MONOCYTES 2 (L) 5 - 13 %    EOSINOPHILS 0 0 - 7 %    BASOPHILS 0 0 - 1 %    METAMYELOCYTES 1 %    IMMATURE GRANULOCYTES 0 0.0 - 0.5 %    ABS. NEUTROPHILS 18.5 (H) 1.8 - 8.0 K/UL    ABS. LYMPHOCYTES 1.9 0.8 - 3.5 K/UL    ABS. MONOCYTES 0.4 0.0 - 1.0 K/UL    ABS. EOSINOPHILS 0.0 0.0 - 0.4 K/UL    ABS. BASOPHILS 0.0 0.0 - 0.1 K/UL    ABS. IMM. GRANS. 0.0 0.00 - 0.04 K/UL    DF MANUAL      RBC COMMENTS NORMOCYTIC, NORMOCHROMIC     METABOLIC PANEL, COMPREHENSIVE    Collection Time: 08/16/19  4:30 AM   Result Value Ref Range    Sodium 143 136 - 145 mmol/L    Potassium 4.4 3.5 - 5.1 mmol/L    Chloride 114 (H) 97 - 108 mmol/L    CO2 23 21 - 32 mmol/L    Anion gap 6 5 - 15 mmol/L    Glucose 121 (H) 65 - 100 mg/dL    BUN 15 6 - 20 MG/DL    Creatinine 1.33 (H) 0.70 - 1.30 MG/DL    BUN/Creatinine ratio 11 (L) 12 - 20      GFR est AA >60 >60 ml/min/1.73m2    GFR est non-AA 53 (L) >60 ml/min/1.73m2    Calcium 7.3 (L) 8.5 - 10.1 MG/DL    Bilirubin, total 0.9 0.2 - 1.0 MG/DL    ALT (SGPT) 31 12 - 78 U/L    AST (SGOT) 31 15 - 37 U/L    Alk. phosphatase 64 45 - 117 U/L    Protein, total 6.0 (L) 6.4 - 8.2 g/dL    Albumin 2.9 (L) 3.5 - 5.0 g/dL    Globulin 3.1 2.0 - 4.0 g/dL    A-G Ratio 0.9 (L) 1.1 - 2.2     MAGNESIUM    Collection Time: 08/16/19  4:30 AM   Result Value Ref Range    Magnesium 1.6 1.6 - 2.4 mg/dL   PHOSPHORUS    Collection Time: 08/16/19  4:30 AM   Result Value Ref Range    Phosphorus 1.9 (L) 2.6 - 4.7 MG/DL             Telemetry: 8-15-19: Sinus rhythm with premature atrial complexes in a pattern of bigeminy   Low voltage QRS Borderline ECG     Imaging:  I have personally reviewed the patients radiographs and have reviewed the reports:  CT of abdomen: 8-15-19:  was non revealing. NO acute findings.         Sanchez Ochoa MD

## 2019-08-16 NOTE — PROGRESS NOTES
0700: Report received from Honorio Davis RN.     0242: Assessment completed at this time. Patient has no complaints. Alert and oriented x4. Levo titrated. 1430: Levo titrated off.     1510: Report given to Shant Guzman RN.

## 2019-08-16 NOTE — PROGRESS NOTES
Pharmacy Automatic Renal Dosing Protocol - Antimicrobials    Indication for Antimicrobials: UTI; septic shock    Current Regimen of Each Antimicrobial:  Cefepime 2 gm IV q8h - started 8/15 Day 2 of 7  Flagyl 500 mg IV q8h - started  8/15 Day 2  Vancomycin 1500mg IV q24h - started 8/15 Day 2    Previous Antimicrobial Therapy:    Date Dose & Interval Measured (mcg/mL) Extrapolated (mcg/mL)                       Vancomycin Tough Goal:  ~15-20     Significant Cultures:   8/15 BCx - ng - pending  8/15 UCx - GNR - pending  8/15 UCx - pending    Radiology / Imaging results: (X-ray, CT scan or MRI):   8/15 - CT abdomen - no acute findings      Labs:  Recent Labs     19  0430 08/15/19  1138 08/15/19  0325   CREA 1.33* 1.83* 1.41*   BUN 15 16 13   WBC 21.0* 18.5* 5.1     Temp (24hrs), Av.8 °F (37.1 °C), Min:97.8 °F (36.6 °C), Max:101.5 °F (38.6 °C)    Paralysis, amputations, malnutrition: n/a  Creatinine Clearance (mL/min) or Dialysis:  Estimated Creatinine Clearance: 63.1 mL/min (A) (based on SCr of 1.33 mg/dL (H)). Estimated Creatinine Clearance (using IBW):53.5 mL/min    Impression/Plan:   Afebrile, SCr improved  Adjusted Vancomycin for improved renal function to 1000mg IV q12h for a projected trough at Css of 18.6  Vancomycin trough level ordered before  8am dose  Continue Cefepime and Metronidazole  Antimicrobial stop date:  Cefepime = 7 days cefepime  Vancomycin and Metronidazole - pending     Pharmacy will follow daily and adjust medications as appropriate for renal function and/or serum levels.     Thank you,  Eamon Correa, [facility]

## 2019-08-16 NOTE — ROUTINE PROCESS
New rhythm noted on monitor. EKG completed. [address] Dr. Brent Tucker paged. Awaiting call back  1714 Per Dr. Brent Tucker cont to monitor BP and HR. If afib cont and ventricular rate > 120's sustained call Dr. Brent Tucker back. [address] [facility] [address]. rBent Tucker called back w/ new orders. 1819 Amio drip started as ordered.     1904 Report to oncpavithra RN

## 2019-08-16 NOTE — PROGRESS NOTES
Hospitalist Progress Note    NAME: Donavon Nunez   :  1945   MRN:  [id number]       Assessment / Plan:  Severe sepsis with septic shock, POA  UTI POA  Hx Citrobacter uti 2017  CASSIE Cr 1.4, was 0.9 in 2017, improving  Sinus bradycardia, chronic baseline HR 40-50s. Has seen Dr. Azeb Damon in 2017 with negative stress test, echo EF 65% with grade 1 diastolic dysfunction  BPH with hx elevated PSA. Bladder [facility] done at [facility] 250ml     --Continue IVF and Levophed to keep Map >65, Improving requirement of Levophed  -  Follow up urine culture. --continue IV ABX. DCed vanc and Flagyl by ICU team, agreed  --stress ulcer prophylaxis with pepcid     Avoid nephrotoxic meds    H/o HLD aware     Body mass index is 32.56 kg/m².     Code: FULL Code. DVT prophylaxis: heparin  Surrogate decision maker:  Wife Raymundo Jewell     Subjective:     Chief Complaint / Reason for Physician Visit  Levophed requirement going down, not dizzy anymore. Reported urinary frequency at home    Review of Systems:  Symptom Y/N Comments  Symptom Y/N Comments   Fever/Chills n   Chest Pain n    Poor Appetite n   Edema n    Cough n   Abdominal Pain n    Sputum    Joint Pain     SOB/PATEL    Pruritis/Rash     Nausea/vomit    Tolerating PT/OT     Diarrhea    Tolerating Diet     Constipation    Other       Could NOT obtain due to:      Objective:     VITALS:   Last 24hrs VS reviewed since prior progress note.  Most recent are:  Patient Vitals for the past 24 hrs:   Temp Pulse Resp BP SpO2   19 1000  60 17 102/60 97 %   19 0930  62 15 105/55 98 %   19 0900  (!) 56 20 110/62 96 %   19 0830  (!) 57 17 108/60 94 %   19 0800 98.4 °F (36.9 °C) (!) 55 19 111/56 95 %   19 0730  (!) 52 19 118/65 97 %   19 0700  (!) 57 21 105/58 96 %   19 0600  (!) 53 19 114/60 94 %   19 0530  (!) 54 19 109/56 95 %   19 0500  (!) 53 17 119/61 94 %   19 0400 99.3 °F (37.4 °C) (!) 50 17 118/62 95 %   19 1913  (!) 55 13 112/62 95 %   08/16/19 0300  (!) 52 17 106/67 93 %   08/16/19 0230  (!) 48 19 112/65 92 %   08/16/19 0200  (!) 47 15 121/63 94 %   08/16/19 0100  (!) 49 19 117/61 92 %   08/16/19 0030  (!) 51  101/53 94 %   08/16/19 0000  (!) 53 19 114/63 95 %   08/15/19 2330 98.5 °F (36.9 °C) (!) 56 21 115/56 95 %   08/15/19 2300  (!) 58 23 108/62 95 %   08/15/19 2200  (!) 56 20 113/59 91 %   08/15/19 2130  (!) 55 19 114/59 93 %   08/15/19 2100  (!) 57 19 (!) 90/36 96 %   08/15/19 2030  (!) 55 19 113/52 95 %   08/15/19 2000 98.4 °F (36.9 °C) (!) 53 19 117/61 97 %   08/15/19 1900  (!) 52 18 116/59 95 %   08/15/19 1800  (!) 52 23 117/67 96 %   08/15/19 1700  (!) 54 20 105/51 95 %   08/15/19 1645  (!) 50 18 106/51 95 %   08/15/19 1630  (!) 51 19 104/60 94 %   08/15/19 1615  (!) 52 17 106/62 94 %   08/15/19 1600  (!) 54 17 107/57 97 %   08/15/19 1545  (!) 52 20 98/52 95 %   08/15/19 1530  (!) 53 19 92/48 94 %   08/15/19 1515  (!) 56 18 98/57 92 %   08/15/19 1500  (!) 58 19 102/58 94 %   08/15/19 1454  66 21 114/62 92 %   08/15/19 1433 98.1 °F (36.7 °C) (!) 59 20 102/59 94 %   08/15/19 1340 97.9 °F (36.6 °C) 60 19 104/66 93 %   08/15/19 1330  63 18 95/68 92 %   08/15/19 1320  (!) 56 19 93/55 93 %   08/15/19 1300  (!) 57 18 99/59 91 %   08/15/19 1250  69 22 101/63 95 %   08/15/19 1240  60 19 94/53 91 %   08/15/19 1230  66 19 (!) 84/45 91 %   08/15/19 1220  (!) 55 18 94/48 91 %   08/15/19 1210  (!) 57 19 109/65 93 %   08/15/19 1200  68 17 100/51 92 %   08/15/19 1150  (!) 57 18 92/52 93 %   08/15/19 1140  73 20 (!) 79/57 95 %   08/15/19 1130  67 15 (!) 82/69 92 %   08/15/19 1122  68 18 (!) 84/62 91 %       Intake/Output Summary (Last 24 hours) at 8/16/2019 1050  Last data filed at 8/16/2019 1000  Gross per 24 hour   Intake 7143.15 ml   Output 2505 ml   Net 4638. 15 ml        PHYSICAL EXAM:  General: WD, WN. Alert, cooperative, no acute distress    EENT:  EOMI. Anicteric sclerae. MMM  Resp:  CTA bilaterally, no wheezing or rales. No accessory muscle use  CV:  Regular  rhythm,  No edema  GI:  Soft, Non distended, Non tender.  +Bowel sounds  Neurologic:  Alert and oriented X 3, normal speech,   Psych:   Good insight. Not anxious nor agitated  Skin:  No rashes. No jaundice    Reviewed most current lab test results and cultures  YES  Reviewed most current radiology test results   YES  Review and summation of old records today    NO  Reviewed patient's current orders and MAR    YES  PMH/SH reviewed - no change compared to H&P  ________________________________________________________________________  Care Plan discussed with:    Comments   Patient x    Family      RN x    Care Manager     Consultant                        Multidiciplinary team rounds were held today with , nursing, pharmacist and clinical coordinator. Patient's plan of care was discussed; medications were reviewed and discharge planning was addressed. ________________________________________________________________________  Total NON critical care TIME:  30   Minutes    Total CRITICAL CARE TIME Spent:   Minutes non procedure based      Comments   >50% of visit spent in counseling and coordination of care     ________________________________________________________________________  Chiki Cárdenas MD     Procedures: see electronic medical records for all procedures/Xrays and details which were not copied into this note but were reviewed prior to creation of Plan. LABS:  I reviewed today's most current labs and imaging studies.   Pertinent labs include:  Recent Labs     08/16/19  0430 08/15/19  1138 08/15/19  0325   WBC 21.0* 18.5* 5.1   HGB 12.0* 12.8 12.8   HCT 35.3* 37.4 39.1   * 144* 139*     Recent Labs     08/16/19  0430 08/15/19  1138 08/15/19  0620 08/15/19  0325    141  --  142   K 4.4 4.1  --  4.4   * 110*  --  105   CO2 23 24  --  26   * 101*  --  92   BUN 15 16  --  13 CREA 1.33* 1.83*  --  1.41*   CA 7.3* 7.9*  --  7.9*   MG 1.6  --   --   --    PHOS 1.9*  --   --   --    ALB 2.9* 3.2*  --  3.1*   TBILI 0.9 0.6  --  0.4   SGOT 31 34  --  42*   ALT 31 29  --  32   INR  --   --  1.1  --        Signed: Jose Cruz Ramirez MD

## 2019-08-16 NOTE — PROGRESS NOTES
Eventual night, Levophed titrated down to 9 mika/min from 17 mika/min, up to chair, feel much better at am, small coarse diminished at left side > I/S encourage 2500 ml, Lactic acid down to 1.7, WBC 21, Phos 1.9,   Bedside and Verbal shift change report given to Rich Lira RN (oncoming nurse) by Tremayne Goodwin RN (offgoing nurse). Report included the following information SBAR, Kardex, Intake/Output, MAR, Accordion, Recent Results, Med Rec Status and Cardiac Rhythm Sinus Bradycardia.

## 2019-08-16 NOTE — INTERDISCIPLINARY ROUNDS
Critical care interdisciplinary rounds held on 08/16/2019. Following members present, Pharmacy, Diabetes Treatment, Case Management, Respiratory Therapy, Physical Therapy and Nutrition. Led by MINA Ruiz RN and Dr. Mendoza Lazo. Plan of care discussed. See clinical pathway for plan of care and interventions and desired outcomes.

## 2019-08-17 ENCOUNTER — APPOINTMENT (OUTPATIENT)
Dept: NON INVASIVE DIAGNOSTICS | Age: 74
DRG: 871 | End: 2019-08-17
Attending: INTERNAL MEDICINE
Payer: MEDICARE

## 2019-08-17 LAB
ALBUMIN SERPL-MCNC: 2.7 G/DL (ref 3.5–5)
ALBUMIN/GLOB SERPL: 0.9 {RATIO} (ref 1.1–2.2)
ALP SERPL-CCNC: 62 U/L (ref 45–117)
ALT SERPL-CCNC: 28 U/L (ref 12–78)
ANION GAP SERPL CALC-SCNC: 6 MMOL/L (ref 5–15)
AST SERPL-CCNC: 30 U/L (ref 15–37)
ATRIAL RATE: 133 BPM
BACTERIA SPEC CULT: NORMAL
BASOPHILS # BLD: 0 K/UL (ref 0–0.1)
BASOPHILS NFR BLD: 0 % (ref 0–1)
BILIRUB SERPL-MCNC: 0.6 MG/DL (ref 0.2–1)
BUN SERPL-MCNC: 9 MG/DL (ref 6–20)
BUN/CREAT SERPL: 10 (ref 12–20)
CALCIUM SERPL-MCNC: 7.7 MG/DL (ref 8.5–10.1)
CALCULATED R AXIS, ECG10: 46 DEGREES
CALCULATED T AXIS, ECG11: 14 DEGREES
CC UR VC: NORMAL
CHLORIDE SERPL-SCNC: 110 MMOL/L (ref 97–108)
CO2 SERPL-SCNC: 24 MMOL/L (ref 21–32)
CREAT SERPL-MCNC: 0.94 MG/DL (ref 0.7–1.3)
DIAGNOSIS, 93000: NORMAL
DIFFERENTIAL METHOD BLD: ABNORMAL
ECHO AV AREA PEAK VELOCITY: 2.8 CM2
ECHO AV AREA VTI: 2.8 CM2
ECHO AV AREA/BSA PEAK VELOCITY: 1.2 CM2/M2
ECHO AV AREA/BSA VTI: 1.2 CM2/M2
ECHO AV CUSP MM: 2.46 CM
ECHO AV MEAN GRADIENT: 3.6 MMHG
ECHO AV PEAK GRADIENT: 6.5 MMHG
ECHO AV PEAK VELOCITY: 127.24 CM/S
ECHO AV VTI: 24.31 CM
ECHO EST RA PRESSURE: 10 MMHG
ECHO LA MAJOR AXIS: 4 CM
ECHO LV INTERNAL DIMENSION DIASTOLIC: 5.62 CM (ref 4.2–5.9)
ECHO LV INTERNAL DIMENSION SYSTOLIC: 3.93 CM
ECHO LV IVSD: 1.1 CM (ref 0.6–1)
ECHO LV MASS 2D: 350.5 G (ref 88–224)
ECHO LV MASS INDEX 2D: 150.2 G/M2 (ref 49–115)
ECHO LV POSTERIOR WALL DIASTOLIC: 0.94 CM (ref 0.6–1)
ECHO LVOT DIAM: 2.44 CM
ECHO LVOT PEAK GRADIENT: 2.3 MMHG
ECHO LVOT PEAK VELOCITY: 76.1 CM/S
ECHO LVOT SV: 68.6 ML
ECHO LVOT VTI: 14.68 CM
ECHO MV A VELOCITY: 37.38 CM/S
ECHO MV AREA PHT: 1.9 CM2
ECHO MV AREA VTI: 3.7 CM2
ECHO MV E DECELERATION TIME (DT): 389.3 MS
ECHO MV E VELOCITY: 55.16 CM/S
ECHO MV E/A RATIO: 1.48
ECHO MV MAX VELOCITY: 61.36 CM/S
ECHO MV MEAN GRADIENT: 0.5 MMHG
ECHO MV PEAK GRADIENT: 1.5 MMHG
ECHO MV PRESSURE HALF TIME (PHT): 117.2 MS
ECHO MV VTI: 18.77 CM
ECHO PV MAX VELOCITY: 83.95 CM/S
ECHO PV MEAN GRADIENT: 1.5 MMHG
ECHO PV PEAK GRADIENT: 2.8 MMHG
ECHO PV VTI: 17.17 CM
ECHO RV TAPSE: 2.36 CM (ref 1.5–2)
EOSINOPHIL # BLD: 0.2 K/UL (ref 0–0.4)
EOSINOPHIL NFR BLD: 1 % (ref 0–7)
ERYTHROCYTE [DISTWIDTH] IN BLOOD BY AUTOMATED COUNT: 13.9 % (ref 11.5–14.5)
GLOBULIN SER CALC-MCNC: 2.9 G/DL (ref 2–4)
GLUCOSE SERPL-MCNC: 99 MG/DL (ref 65–100)
HCT VFR BLD AUTO: 34.6 % (ref 36.6–50.3)
HGB BLD-MCNC: 11.4 G/DL (ref 12.1–17)
IMM GRANULOCYTES # BLD AUTO: 0.6 K/UL (ref 0–0.04)
IMM GRANULOCYTES NFR BLD AUTO: 4 % (ref 0–0.5)
LYMPHOCYTES # BLD: 1.9 K/UL (ref 0.8–3.5)
LYMPHOCYTES NFR BLD: 12 % (ref 12–49)
MAGNESIUM SERPL-MCNC: 1.7 MG/DL (ref 1.6–2.4)
MCH RBC QN AUTO: 32.9 PG (ref 26–34)
MCHC RBC AUTO-ENTMCNC: 32.9 G/DL (ref 30–36.5)
MCV RBC AUTO: 100 FL (ref 80–99)
MONOCYTES # BLD: 0.8 K/UL (ref 0–1)
MONOCYTES NFR BLD: 5 % (ref 5–13)
NEUTS SEG # BLD: 12.7 K/UL (ref 1.8–8)
NEUTS SEG NFR BLD: 78 % (ref 32–75)
NRBC # BLD: 0 K/UL (ref 0–0.01)
NRBC BLD-RTO: 0 PER 100 WBC
PHOSPHATE SERPL-MCNC: 1.7 MG/DL (ref 2.6–4.7)
PLATELET # BLD AUTO: 117 K/UL (ref 150–400)
PMV BLD AUTO: 10.8 FL (ref 8.9–12.9)
POTASSIUM SERPL-SCNC: 3.8 MMOL/L (ref 3.5–5.1)
PROT SERPL-MCNC: 5.6 G/DL (ref 6.4–8.2)
Q-T INTERVAL, ECG07: 338 MS
QRS DURATION, ECG06: 106 MS
QTC CALCULATION (BEZET), ECG08: 457 MS
RBC # BLD AUTO: 3.46 M/UL (ref 4.1–5.7)
RBC MORPH BLD: ABNORMAL
SERVICE CMNT-IMP: NORMAL
SODIUM SERPL-SCNC: 140 MMOL/L (ref 136–145)
VENTRICULAR RATE, ECG03: 110 BPM
WBC # BLD AUTO: 16.2 K/UL (ref 4.1–11.1)

## 2019-08-17 PROCEDURE — 65660000000 HC RM CCU STEPDOWN

## 2019-08-17 PROCEDURE — 74011000250 HC RX REV CODE- 250: Performed by: HOSPITALIST

## 2019-08-17 PROCEDURE — 74011250636 HC RX REV CODE- 250/636: Performed by: HOSPITALIST

## 2019-08-17 PROCEDURE — 74011250637 HC RX REV CODE- 250/637: Performed by: HOSPITALIST

## 2019-08-17 PROCEDURE — 74011000258 HC RX REV CODE- 258: Performed by: HOSPITALIST

## 2019-08-17 PROCEDURE — 84100 ASSAY OF PHOSPHORUS: CPT

## 2019-08-17 PROCEDURE — 77030027138 HC INCENT SPIROMETER -A

## 2019-08-17 PROCEDURE — 36415 COLL VENOUS BLD VENIPUNCTURE: CPT

## 2019-08-17 PROCEDURE — 74011000258 HC RX REV CODE- 258: Performed by: INTERNAL MEDICINE

## 2019-08-17 PROCEDURE — 74011250636 HC RX REV CODE- 250/636: Performed by: INTERNAL MEDICINE

## 2019-08-17 PROCEDURE — 77010033678 HC OXYGEN DAILY

## 2019-08-17 PROCEDURE — 93306 TTE W/DOPPLER COMPLETE: CPT

## 2019-08-17 PROCEDURE — 80053 COMPREHEN METABOLIC PANEL: CPT

## 2019-08-17 PROCEDURE — 85025 COMPLETE CBC W/AUTO DIFF WBC: CPT

## 2019-08-17 PROCEDURE — 83735 ASSAY OF MAGNESIUM: CPT

## 2019-08-17 RX ADMIN — CEFEPIME HYDROCHLORIDE 2 G: 2 INJECTION, POWDER, FOR SOLUTION INTRAVENOUS at 20:30

## 2019-08-17 RX ADMIN — Medication 10 ML: at 05:19

## 2019-08-17 RX ADMIN — SODIUM CHLORIDE 150 ML/HR: 900 INJECTION, SOLUTION INTRAVENOUS at 18:27

## 2019-08-17 RX ADMIN — FAMOTIDINE 20 MG: 10 INJECTION, SOLUTION INTRAVENOUS at 20:31

## 2019-08-17 RX ADMIN — SODIUM CHLORIDE 150 ML/HR: 900 INJECTION, SOLUTION INTRAVENOUS at 14:01

## 2019-08-17 RX ADMIN — Medication 10 ML: at 21:54

## 2019-08-17 RX ADMIN — CEFEPIME HYDROCHLORIDE 2 G: 2 INJECTION, POWDER, FOR SOLUTION INTRAVENOUS at 14:12

## 2019-08-17 RX ADMIN — ENOXAPARIN SODIUM 120 MG: 120 INJECTION SUBCUTANEOUS at 06:47

## 2019-08-17 RX ADMIN — ENOXAPARIN SODIUM 120 MG: 120 INJECTION SUBCUTANEOUS at 18:29

## 2019-08-17 RX ADMIN — Medication 1 AMPULE: at 09:00

## 2019-08-17 RX ADMIN — Medication 10 ML: at 14:13

## 2019-08-17 RX ADMIN — CEFEPIME HYDROCHLORIDE 2 G: 2 INJECTION, POWDER, FOR SOLUTION INTRAVENOUS at 00:33

## 2019-08-17 RX ADMIN — SODIUM CHLORIDE 150 ML/HR: 900 INJECTION, SOLUTION INTRAVENOUS at 06:47

## 2019-08-17 RX ADMIN — AMIODARONE HYDROCHLORIDE 0.5 MG/MIN: 50 INJECTION, SOLUTION INTRAVENOUS at 00:16

## 2019-08-17 NOTE — PROGRESS NOTES
0700: Report received from Honorio Davis RN.    0730: Assessment completed. Patient alert and oriented in bed. Eager to get up and move. 0900: Echo at bedside. 1200: RIJ d/c'd    1400: Patient encouraged to sit up to chair. Patient refuses at this time. States he would like to visit with his family. 1800: Report given to Dada Singh, [address]: Patient transferred to room [phone] [id number] with personal belongings on monitor.

## 2019-08-17 NOTE — PROGRESS NOTES
Bedside shift change report GIVEN TO Bertha Oquendo RN. Report included the following information SBAR. SIGNIFICANT CHANGES DURING SHIFT:  Patient arrived from CCU at 1811, sitting comfortably in recliner on room air. CONCERNS TO ADDRESS WITH MD:            [address] NURSING NOTE   Admission Date 8/15/2019   Admission Diagnosis Septic shock ([facility] [A41.9, R65.21]  UTI (urinary tract infection) [N39.0]  CASSIE (acute kidney injury) ([facility] [N17.9]   Consults IP CONSULT TO HOSPITALIST  IP CONSULT TO CARDIOLOGY      Cardiac Monitoring [x] Yes [] No      Purposeful Hourly Rounding [x] Yes    Rosa Score Total Score: 3   Rosa score 3 or > [x] Bed Alarm [] Avasys [] 1:1 sitter [] Patient refused (Signed refusal form in chart)   Junior Score Junior Score: 20   Junior score 14 or < [] PMT consult [] Wound Care consult    []  Specialty bed  [] Nutrition consult      Influenza Vaccine Received Flu Vaccine for Current Season (usually Sept-March): Not Flu Season           Oxygen needs? [x] Room air Oxygen @  []1L    []2L    []3L   []4L    []5L   []6L via  NC   Chronic home O2 use? [] Yes [] No  Perform O2 challenge test and document in progress note using smartHexagoe (.Homeoxygen)      Last bowel movement Last Bowel Movement Date: 08/16/19      Urinary Catheter             LDAs               Peripheral IV 08/15/19 Right Antecubital (Active)   Site Assessment Clean, dry, & intact 8/17/2019  4:00 PM   Phlebitis Assessment 1 8/17/2019  4:00 PM   Infiltration Assessment 0 8/17/2019  4:00 PM   Dressing Status Clean, dry, & intact 8/17/2019  4:00 PM   Dressing Type Transparent;Tape 8/17/2019  4:00 PM   Hub Color/Line Status Green; Infusing 8/17/2019  4:00 PM   Alcohol Cap Used Yes 8/17/2019  4:00 AM                         Readmission Risk Assessment Tool Score Low Risk            10       Total Score        3 Has Seen PCP in Last 6 Months (Yes=3, No=0)    2 . Living with Significant Other. Assisted Living. LTAC. SNF.  or Rehab    5 Pt.  Coverage (Medicare=5 , Medicaid, or Self-Pay=4)        Criteria that do not apply:    Patient Length of Stay (>5 days = 3)    IP Visits Last 12 Months (1-3=4, 4=9, >4=11)    Charlson Comorbidity Score (Age + Comorbid Conditions)       Expected Length of Stay 4d 19h   Actual Length of Stay 2

## 2019-08-17 NOTE — PROGRESS NOTES
Levophed restarted at 2 mika/min as SBP 87/60 mmHg,  To follow up      08/16/19 2130   Vitals   Pulse (Heart Rate) 100   Resp Rate 16   O2 Sat (%) 95 %   BP (!) 87/60   MAP (Monitor) 67   MAP (Calculated) 69   Oxygen Therapy   Pulse via Oximetry 101 beats per minute     0410  BP been fine all the night, Levophed titrated off. To follow up.

## 2019-08-17 NOTE — PROGRESS NOTES
TRANSFER - IN REPORT:    Verbal report received from CCU (name) on Anna Rick  being received from [facility], [address] [facility] (unit) for routine progression of care      Report consisted of patients Situation, Background, Assessment and   Recommendations(SBAR). Information from the following report(s) SBAR was reviewed with the receiving nurse. Opportunity for questions and clarification was provided. Assessment completed upon patients arrival to unit and care assumed. 46 - CCU RN will get levophed drip discontinued before transfer to [facility]. [phone] - Patient arrived in room, walked to recliner. 1820 - Dual skin done with Noemy, RN. Bilateral second toes black, per patient from his shoes. Scar on R knee, L arm. Sacrum pink, blanchable.

## 2019-08-17 NOTE — PROGRESS NOTES
Hospitalist Progress Note    NAME: Steve Cam   :  1945   MRN:  [id number]       Assessment / Plan:  Severe sepsis with septic shock, POA, improving  UTI POA  Hx Citrobacter uti 2017  CASSIE Cr 1.4, was 0.9 in 2017, improving/resolved  Sinus bradycardia, chronic baseline HR 40-50s. Has seen Dr. Eli Lynn in 2017 with negative stress test, echo EF 65% with grade 1 diastolic dysfunction  BPH with hx elevated PSA. Bladder scan done at [facility] 250ml       --Continue IVF and Levophed to keep Map >65, Improving requirement of Levophed  -  Follow up urine culture. --continue IV ABX. DCed vanc and Flagyl by ICU team, agreed  --stress ulcer prophylaxis with pepcid     Avoid nephrotoxic meds    H/o HLD aware    Afib with RVR   Was placed on amio drip without bolus. -now NSR. Cardiology evaluation  -ECHO pending  -currently full dose lovenox.       Body mass index is 32.56 kg/m².     Code: FULL Code. DVT prophylaxis: heparin  Surrogate decision maker:  Wife Nnamdi Villa       Subjective:     Chief Complaint / Reason for Physician Visit  Off levo, was on amio, on hold due to bradycardia. No symptoms of afib overnight    Review of Systems:  Symptom Y/N Comments  Symptom Y/N Comments   Fever/Chills n   Chest Pain n    Poor Appetite n   Edema n    Cough n   Abdominal Pain n    Sputum    Joint Pain     SOB/PATEL    Pruritis/Rash     Nausea/vomit    Tolerating PT/OT     Diarrhea    Tolerating Diet     Constipation    Other       Could NOT obtain due to:      Objective:     VITALS:   Last 24hrs VS reviewed since prior progress note.  Most recent are:  Patient Vitals for the past 24 hrs:   Temp Pulse Resp BP SpO2   19 1221 98.6 °F (37 °C)       19 1200  (!) 56 21 110/66 99 %   19 1130  (!) 56 16 112/67 97 %   19 1036    104/63    19 1030  (!) 58 19 108/65 94 %   19 1000  61 19 112/62 95 %   19 0900  (!) 59 17 101/55 93 %   19 0800 98.4 °F (36.9 °C) (!) 59 17 96/57 96 %   08/17/19 0700  (!) 50 14 97/54 96 %   08/17/19 0600  (!) 48 15 90/56 96 %   08/17/19 0550  (!) 45 14 96/56 96 %   08/17/19 0547  (!) 45 15 91/50 97 %   08/17/19 0500  81 16 110/65 96 %   08/17/19 0400 98.2 °F (36.8 °C) 78 14 102/68 94 %   08/17/19 0300  80 14 116/66 96 %   08/17/19 0200  86 17 115/73 95 %   08/17/19 0100  90 15 109/63 95 %   08/17/19 0000 98 °F (36.7 °C) 85 15 102/65 96 %   08/16/19 2300  96 20 99/58 96 %   08/16/19 2200  85 15 102/59 96 %   08/16/19 2145  91 16 96/59 95 %   08/16/19 2130  100 16 (!) 87/60 95 %   08/16/19 2100  (!) 101 14 96/53 93 %   08/16/19 2000  (!) 102 19 100/61 98 %   08/16/19 1900 98.5 °F (36.9 °C) (!) 105 17 112/69 93 %   08/16/19 1830  (!) 117 19 110/69 92 %   08/16/19 1800  (!) 113 17 121/72 92 %   08/16/19 1730  (!) 111 16 113/81 93 %   08/16/19 1700  (!) 115 20 110/70 (!) 77 %   08/16/19 1621  71 16 116/64 (!) 75 %   08/16/19 1500 98.4 °F (36.9 °C) 61 17 108/61 94 %       Intake/Output Summary (Last 24 hours) at 8/17/2019 1451  Last data filed at 8/17/2019 1404  Gross per 24 hour   Intake 4499.93 ml   Output 2230 ml   Net 2269.93 ml        PHYSICAL EXAM:  General: WD, WN. Alert, cooperative, no acute distress    EENT:  EOMI. Anicteric sclerae. MMM  Resp:  CTA bilaterally, no wheezing or rales. No accessory muscle use  CV:  Regular  rhythm,  No edema, fab  GI:  Soft, Non distended, Non tender.  +Bowel sounds  Neurologic:  Alert and oriented X 3, normal speech,   Psych:   Good insight. Not anxious nor agitated  Skin:  No rashes.   No jaundice    Reviewed most current lab test results and cultures  YES  Reviewed most current radiology test results   YES  Review and summation of old records today    NO  Reviewed patient's current orders and MAR    YES  PMH/SH reviewed - no change compared to H&P  ________________________________________________________________________  Care Plan discussed with:    Comments   Patient x    Family      RN x    Care Manager     Consultant                        Multidiciplinary team rounds were held today with , nursing, pharmacist and clinical coordinator. Patient's plan of care was discussed; medications were reviewed and discharge planning was addressed. ________________________________________________________________________  Total NON critical care TIME:  25  Minutes    Total CRITICAL CARE TIME Spent:   Minutes non procedure based      Comments   >50% of visit spent in counseling and coordination of care     ________________________________________________________________________  Luana Byrnes MD     Procedures: see electronic medical records for all procedures/Xrays and details which were not copied into this note but were reviewed prior to creation of Plan. LABS:  I reviewed today's most current labs and imaging studies.   Pertinent labs include:  Recent Labs     08/17/19 0418 08/16/19  0430 08/15/19  1138   WBC 16.2* 21.0* 18.5*   HGB 11.4* 12.0* 12.8   HCT 34.6* 35.3* 37.4   * 121* 144*     Recent Labs     08/17/19 0418 08/16/19  0430 08/15/19  1138 08/15/19  0620    143 141  --    K 3.8 4.4 4.1  --    * 114* 110*  --    CO2 24 23 24  --    GLU 99 121* 101*  --    BUN 9 15 16  --    CREA 0.94 1.33* 1.83*  --    CA 7.7* 7.3* 7.9*  --    MG 1.7 1.6  --   --    PHOS 1.7* 1.9*  --   --    ALB 2.7* 2.9* 3.2*  --    TBILI 0.6 0.9 0.6  --    SGOT 30 31 34  --    ALT 28 31 29  --    INR  --   --   --  1.1       Signed: Luana Byrnes MD

## 2019-08-17 NOTE — PROGRESS NOTES
[facility]  Pulmonary, Critical Care, and Sleep Medicine    Name: Iman Estes MRN: [id number]   : 1945 Hospital: [facility]   Date: 2019        Critical Care Progress Note    IMPRESSION:   · Severe Sepsis and Shock, WBC of 18.5.   · Hgb of 12.8. · Possible Acute UTI, Gram negatives noted on Urine Cx. · Fevers, Rigors. · Afib  · MIld Acute renal insufficiency, Cr of 1.8. · Sinus Bradycardia with HR of 40-50s. · BPH and Elevated PSA. · Desires to be full code. · Critically ill due to need for vasopressors and severe UTI. 35 min CC, EOP. RECOMMENDATIONS:   · On Amiodarone  · Cards evaluation  · On Cefepime, Flagyl, and Vanc.-Will stop the flagyl and vanc. · Will need to follow Cx. · Pressors to Keep MAP over 65. On levophed. Wean as tolerated. · IV hydration, will give additional saline bolus. · May need further  evaluation. · Serial labs. Subjective/History:       Seen and examined  Episode of Afib yesterday started on Amiodarone   Awaiting cards evaluation today  Off pressors      Last 24 hrs: No new chest pain, no back pain, no abdominal pain. NO headache. No dizziness. This patient has been seen and evaluated at the request of Dr. Dom Rosado for above. Patient is a 75 y.o. male who went to bed last pm in his normal state of health. Awoke with rigors. He also noted that he had vomiting and diarrhea about 12 times. He was noted by his wife to have a low blood pressure. Per wife's report he had decrease po intake for several days. Has been feeling very fatigued over last 18 hrs. While in ER was noted to have low blood pressures. Had CVC placed and was placed on levophed. Has been passing his urine since in ER. He was seen in the ICU and reports he is feeling better.            Past Medical History:   Diagnosis Date    Bradycardia     Dyslipidemia     Elevated PSA     Skin lesion of face     UTI (urinary tract infection)       History reviewed. No pertinent surgical history. Prior to Admission medications    Not on File     Current Facility-Administered Medications   Medication Dose Route Frequency    enoxaparin (LOVENOX) injection 120 mg  120 mg SubCUTAneous Q12H    amiodarone (CORDARONE) 900 mg in dextrose 5% 250 ml infusion  0.5-1 mg/min IntraVENous TITRATE    NOREPINephrine (LEVOPHED) 8 mg in 5% dextrose 250mL infusion  2-90 mcg/min IntraVENous TITRATE    0.9% sodium chloride infusion  150 mL/hr IntraVENous CONTINUOUS    sodium chloride (NS) flush 5-40 mL  5-40 mL IntraVENous Q8H    cefepime (MAXIPIME) 2 g in 0.9% sodium chloride (MBP/ADV) 100 mL  2 g IntraVENous Q12H    alcohol 62% (NOZIN) nasal  1 Ampule  1 Ampule Topical Q12H    famotidine (PF) (PEPCID) 20 mg in sodium chloride 0.9% 10 mL injection  20 mg IntraVENous Q24H     No Known Allergies   Social History     Tobacco Use    Smoking status: Former Smoker     Packs/day: 1.00     Years: 5.00     Pack years: 5.00     Types: Cigarettes     Last attempt to quit: 1967     Years since quittin.9    Smokeless tobacco: Never Used   Substance Use Topics    Alcohol use:  Yes     Alcohol/week: 7.0 standard drinks     Types: 7 Glasses of wine per week     Comment: 16 oz tonight      Family History   Problem Relation Age of Onset    No Known Problems Mother     Alcohol abuse Father     Coronary Artery Disease Neg Hx         Review of Systems:  Constitutional: positive for fatigue  Eyes: negative  Ears, nose, mouth, throat, and face: negative  Respiratory: negative  Cardiovascular: negative  Gastrointestinal: positive for nausea, vomiting and diarrhea  Genitourinary:positive for dysuria  Integument/breast: negative  Hematologic/lymphatic: negative  Musculoskeletal:negative  Neurological: negative  Behavioral/Psych: negative  Endocrine: negative  Allergic/Immunologic: negative    Objective:   Vital Signs:    Visit Vitals  BP 97/54 Pulse (!) 50   Temp 98.2 °F (36.8 °C)   Resp 14   Ht 6' (1.829 m)   Wt 116.6 kg (257 lb 0.9 oz)   SpO2 96%   BMI 34.86 kg/m²       O2 Device: Nasal cannula   O2 Flow Rate (L/min): 2 l/min   Temp (24hrs), Av.3 °F (36.8 °C), Min:98 °F (36.7 °C), Max:98.5 °F (36.9 °C)       Intake/Output:   Last shift:      No intake/output data recorded. Last 3 shifts: 08/15 1901 -  0700  In: 7932.1 [P.O.:325; I.V.:7607.1]  Out: 3330 [Urine:3330]    Intake/Output Summary (Last 24 hours) at 2019 0839  Last data filed at 2019 0700  Gross per 24 hour   Intake 5179.28 ml   Output 1850 ml   Net 3329.28 ml       Physical Exam:    General:  Alert, cooperative, no distress, appears stated age. Conversant. NO distress. IJ CVC in place on right side. Head:  Normocephalic, without obvious abnormality, atraumatic. Eyes:  Conjunctivae/corneas clear. PERRL, EOMs intact. Nose: Nares normal. Septum midline. Mucosa normal. No drainage or sinus tenderness. Throat: Lips, mucosa, and tongue normal. Teeth and gums normal.   Neck: Supple, symmetrical, trachea midline, no adenopathy, thyroid: no enlargment/tenderness/nodules, no carotid bruit and no JVD. Back:   Symmetric, no curvature. ROM normal.   Lungs:   Clear to auscultation bilaterally. Chest wall:  No tenderness or deformity. Heart:  Regular rate and rhythm, S1, S2 normal, no murmur, click, rub or gallop. Abdomen:   Soft, non-tender. Bowel sounds normal. No masses,  No organomegaly. Obese. Extremities: Extremities normal, atraumatic, no cyanosis or edema. Pulses: 2+ and symmetric all extremities. Skin: Skin color, texture, turgor normal. No rashes or lesions   Lymph nodes: Cervical, supraclavicular, and axillary nodes normal.   Neurologic: Grossly nonfocal, has good strength of BUE and BLE. Psych: NO anxiety or no depression.         Data:     Recent Results (from the past 24 hour(s))   EKG, 12 LEAD, INITIAL    Collection Time: 19  4:53 PM Result Value Ref Range    Ventricular Rate 110 BPM    Atrial Rate 133 BPM    QRS Duration 106 ms    Q-T Interval 338 ms    QTC Calculation (Bezet) 457 ms    Calculated R Axis 46 degrees    Calculated T Axis 14 degrees    Diagnosis       Atrial fibrillation with rapid ventricular response  Low voltage QRS  Abnormal ECG  When compared with ECG of 15-AUG-2019 04:32,  Atrial fibrillation has replaced Sinus rhythm  Nonspecific T wave abnormality now evident in Inferior leads     CBC WITH AUTOMATED DIFF    Collection Time: 08/17/19  4:18 AM   Result Value Ref Range    WBC 16.2 (H) 4.1 - 11.1 K/uL    RBC 3.46 (L) 4.10 - 5.70 M/uL    HGB 11.4 (L) 12.1 - 17.0 g/dL    HCT 34.6 (L) 36.6 - 50.3 %    .0 (H) 80.0 - 99.0 FL    MCH 32.9 26.0 - 34.0 PG    MCHC 32.9 30.0 - 36.5 g/dL    RDW 13.9 11.5 - 14.5 %    PLATELET 357 (L) 658 - 400 K/uL    MPV 10.8 8.9 - 12.9 FL    NRBC 0.0 0  WBC    ABSOLUTE NRBC 0.00 0.00 - 0.01 K/uL    NEUTROPHILS 78 (H) 32 - 75 %    LYMPHOCYTES 12 12 - 49 %    MONOCYTES 5 5 - 13 %    EOSINOPHILS 1 0 - 7 %    BASOPHILS 0 0 - 1 %    IMMATURE GRANULOCYTES 4 (H) 0.0 - 0.5 %    ABS. NEUTROPHILS 12.7 (H) 1.8 - 8.0 K/UL    ABS. LYMPHOCYTES 1.9 0.8 - 3.5 K/UL    ABS. MONOCYTES 0.8 0.0 - 1.0 K/UL    ABS. EOSINOPHILS 0.2 0.0 - 0.4 K/UL    ABS. BASOPHILS 0.0 0.0 - 0.1 K/UL    ABS. IMM.  GRANS. 0.6 (H) 0.00 - 0.04 K/UL    DF SMEAR SCANNED      RBC COMMENTS NORMOCYTIC, NORMOCHROMIC     METABOLIC PANEL, COMPREHENSIVE    Collection Time: 08/17/19  4:18 AM   Result Value Ref Range    Sodium 140 136 - 145 mmol/L    Potassium 3.8 3.5 - 5.1 mmol/L    Chloride 110 (H) 97 - 108 mmol/L    CO2 24 21 - 32 mmol/L    Anion gap 6 5 - 15 mmol/L    Glucose 99 65 - 100 mg/dL    BUN 9 6 - 20 MG/DL    Creatinine 0.94 0.70 - 1.30 MG/DL    BUN/Creatinine ratio 10 (L) 12 - 20      GFR est AA >60 >60 ml/min/1.73m2    GFR est non-AA >60 >60 ml/min/1.73m2    Calcium 7.7 (L) 8.5 - 10.1 MG/DL    Bilirubin, total 0.6 0.2 - 1.0 MG/DL ALT (SGPT) 28 12 - 78 U/L    AST (SGOT) 30 15 - 37 U/L    Alk.  phosphatase 62 45 - 117 U/L    Protein, total 5.6 (L) 6.4 - 8.2 g/dL    Albumin 2.7 (L) 3.5 - 5.0 g/dL    Globulin 2.9 2.0 - 4.0 g/dL    A-G Ratio 0.9 (L) 1.1 - 2.2     MAGNESIUM    Collection Time: 08/17/19  4:18 AM   Result Value Ref Range    Magnesium 1.7 1.6 - 2.4 mg/dL   PHOSPHORUS    Collection Time: 08/17/19  4:18 AM   Result Value Ref Range    Phosphorus 1.7 (L) 2.6 - 4.7 MG/DL             Telemetry: 8-15-19: Sinus rhythm with premature atrial complexes             D/W Nursing  Move out later today if OK with cards         Terry Puri MD

## 2019-08-17 NOTE — CONSULTS
Subjective:      Date of  Admission: 8/15/2019 10:16 AM     Admission type:Emergency    Concepción Farfan is a 75 y.o. male admitted for [facility] shock [facility] [A41.9, R65.21];UTI (urinary tract infection) [N39.0]; CASSIE (acute kidney injury) ([facility] [N17.9]. Presented with fever, chill, nausea and vomiting. Requiring pressors. Cardiology consult requested for a. Fib with RVR. now off amio and has been in sinus rhythm. Feels much better. He denies chest pain, chest pressure/discomfort, dyspnea, palpitations, irregular heart beats, near-syncope, syncope, fatigue, orthopnea, paroxysmal nocturnal dyspnea, exertional chest pressure/discomfort, claudication, lower extremity edema. Patient Active Problem List    Diagnosis Date Noted    UTI (urinary tract infection) 08/15/2019    CASSIE (acute kidney [facility]) ([facility] 08/15/2019    Sept[facility] shock ([facility] 08/15/2019    Atrial arrhythmia 10/12/2017    Precordial pain 09/27/2017    Dyslipidemia     Bradycardia     Advance directive discussed with patient 09/26/2016      Risa Faye PA-C  Past Medical History:   Diagnosis Date    Bradycardia     Dyslipidemia     Elevated PSA     Skin lesion of face     UTI (urinary tract infection)       History reviewed. No pertinent surgical history.   No Known Allergies   Family History   Problem Relation Age of Onset    No Known Problems Mother     Alcohol abuse Father     Coronary Artery Disease Neg Hx       Current Facility-Administered Medications   Medication Dose Route Frequency    enoxaparin (LOVENOX) injection 120 mg  120 mg SubCUTAneous Q12H    NOREPINephrine (LEVOPHED) 8 mg in 5% dextrose 250mL infusion  2-90 mcg/min IntraVENous TITRATE    0.9% sodium chloride infusion  150 mL/hr IntraVENous CONTINUOUS    sodium chloride (NS) flush 5-40 mL  5-40 mL IntraVENous Q8H    sodium chloride (NS) flush 5-40 mL  5-40 mL IntraVENous PRN    acetaminophen (TYLENOL) tablet 650 mg  650 mg Oral Q6H PRN    ondansetron [facility]) injection 4 mg  4 mg IntraVENous Q6H PRN    cefepime (MAXIPIME) 2 g in 0.9% sodium chloride (MBP/ADV) 100 mL  2 g IntraVENous Q12H    alcohol 62% (NOZIN) nasal  1 Ampule  1 Ampule Topical Q12H    famotidine (PF) (PEPCID) 20 mg in sodium chloride 0.9% 10 mL injection  20 mg IntraVENous Q24H      Review of Symptoms:  Constitutional: as above. Eyes: negative  Ears, nose, mouth, throat, and face: negative  Respiratory: No exertional dyspnea, orthopnea, PND, cough, hemoptysis, URI. Cardiovascular: No CP, palpitations, sweating, lightheadedness, dizziness, syncope, presyncope, lower extremity swelling. Gastrointestinal: No diarrhea, constipation, abdominal pain, hematemesis, melena, hematochezia  Genitourinary:No urinary complaints. Musculoskeletal:negative  Neurological: negative  Behvioral/Psych: negative  Endocrine: negative     Subjective:      Visit Vitals  /55   Pulse (!) 59   Temp 98.4 °F (36.9 °C)   Resp 17   Ht 6' (1.829 m)   Wt 257 lb 0.9 oz (116.6 kg)   SpO2 93%   BMI 34.86 kg/m²       Physical Exam  Abdomen: soft, non-tender.  Bowel sounds normal.   Extremities: no cyanosis or edema  Heart: regular rate and rhythm, S1, S2 normal, no murmur, click, rub or gallop  Lungs: clear to auscultation bilaterally  Neck: supple, no carotid bruit and no JVD  Neurologic: Grossly normal  Pulses: 2+       Cardiographics    Telemetry: normal sinus rhythm    ECG: atrial fibrillation    Echocardiogram: Not done    Labs:   Recent Results (from the past 24 hour(s))   EKG, 12 LEAD, INITIAL    Collection Time: 08/16/19  4:53 PM   Result Value Ref Range    Ventricular Rate 110 BPM    Atrial Rate 133 BPM    QRS Duration 106 ms    Q-T Interval 338 ms    QTC Calculation (Bezet) 457 ms    Calculated R Axis 46 degrees    Calculated T Axis 14 degrees    Diagnosis       Atrial fibrillation with rapid ventricular response  Low voltage QRS  Abnormal ECG  When compared with ECG of 15-AUG-2019 04:32,  Atrial fibrillation has replaced Sinus rhythm  Nonspecific T wave abnormality now evident in Inferior leads  Confirmed by Nazanin Bryson ([id number]) on 8/17/2019 9:43:51 AM     CBC WITH AUTOMATED DIFF    Collection Time: 08/17/19  4:18 AM   Result Value Ref Range    WBC 16.2 (H) 4.1 - 11.1 K/uL    RBC 3.46 (L) 4.10 - 5.70 M/uL    HGB 11.4 (L) 12.1 - 17.0 g/dL    HCT 34.6 (L) 36.6 - 50.3 %    .0 (H) 80.0 - 99.0 FL    MCH 32.9 26.0 - 34.0 PG    MCHC 32.9 30.0 - 36.5 g/dL    RDW 13.9 11.5 - 14.5 %    PLATELET 687 (L) 512 - 400 K/uL    MPV 10.8 8.9 - 12.9 FL    NRBC 0.0 0  WBC    ABSOLUTE NRBC 0.00 0.00 - 0.01 K/uL    NEUTROPHILS 78 (H) 32 - 75 %    LYMPHOCYTES 12 12 - 49 %    MONOCYTES 5 5 - 13 %    EOSINOPHILS 1 0 - 7 %    BASOPHILS 0 0 - 1 %    IMMATURE GRANULOCYTES 4 (H) 0.0 - 0.5 %    ABS. NEUTROPHILS 12.7 (H) 1.8 - 8.0 K/UL    ABS. LYMPHOCYTES 1.9 0.8 - 3.5 K/UL    ABS. MONOCYTES 0.8 0.0 - 1.0 K/UL    ABS. EOSINOPHILS 0.2 0.0 - 0.4 K/UL    ABS. BASOPHILS 0.0 0.0 - 0.1 K/UL    ABS. IMM. GRANS. 0.6 (H) 0.00 - 0.04 K/UL    DF SMEAR SCANNED      RBC COMMENTS NORMOCYTIC, NORMOCHROMIC     METABOLIC PANEL, COMPREHENSIVE    Collection Time: 08/17/19  4:18 AM   Result Value Ref Range    Sodium 140 136 - 145 mmol/L    Potassium 3.8 3.5 - 5.1 mmol/L    Chloride 110 (H) 97 - 108 mmol/L    CO2 24 21 - 32 mmol/L    Anion gap 6 5 - 15 mmol/L    Glucose 99 65 - 100 mg/dL    BUN 9 6 - 20 MG/DL    Creatinine 0.94 0.70 - 1.30 MG/DL    BUN/Creatinine ratio 10 (L) 12 - 20      GFR est AA >60 >60 ml/min/1.73m2    GFR est non-AA >60 >60 ml/min/1.73m2    Calcium 7.7 (L) 8.5 - 10.1 MG/DL    Bilirubin, total 0.6 0.2 - 1.0 MG/DL    ALT (SGPT) 28 12 - 78 U/L    AST (SGOT) 30 15 - 37 U/L    Alk.  phosphatase 62 45 - 117 U/L    Protein, total 5.6 (L) 6.4 - 8.2 g/dL    Albumin 2.7 (L) 3.5 - 5.0 g/dL    Globulin 2.9 2.0 - 4.0 g/dL    A-G Ratio 0.9 (L) 1.1 - 2.2     MAGNESIUM    Collection Time: 08/17/19  4:18 AM   Result Value Ref Range Magnesium 1.7 1.6 - 2.4 mg/dL   PHOSPHORUS    Collection Time: 08/17/19  4:18 AM   Result Value Ref Range    Phosphorus 1.7 (L) 2.6 - 4.7 MG/DL        Assessment:     Assessment:       Active Problems:    UTI (urinary tract infection) (8/15/2019)      CASSIE (acute kidney injury) ([facility] (8/15/2019)      Septic shock ([facility] (8/15/2019)         Plan:     1. Transient a. Fib: most likely due to septic shock. Now off amio. Clinically better. F/u Echo. Dc IV amio. Monitor. Hopefully with resolution of sepsis, will not need any long term medication. On lovenox. 2. Septic shock: resolving. 3. Ok to tx to floor.

## 2019-08-17 NOTE — PROGRESS NOTES
Problem: Falls - Risk of  Goal: *Absence of Falls  Description  Document Travis Boeck Fall Risk and appropriate interventions in the flowsheet.   Outcome: Progressing Towards Goal  Note:   Fall Risk Interventions:  Mobility Interventions: Assess mobility with egress test         Medication Interventions: Assess postural VS orthostatic hypotension    Elimination Interventions: Bed/chair exit alarm

## 2019-08-17 NOTE — PROGRESS NOTES
patient came with Urosepsis with HR 40-60, then turned to A Fib RVR required Amiodarone Drip, now HR back to Sinus bradycardia 47 b/min, BP 96/56 mmHg, on call paged to jamar about Amiodarone drip.  0600  On call advised to stopped the Amiodarone for now,    Bedside and Verbal shift change report given to Cuba Hall RN (oncoming nurse) by Honorio Davis RN (offgoing nurse). Report included the following information SBAR, Kardex, Intake/Output, MAR, Accordion, Recent Results, Med Rec Status and Cardiac Rhythm Sinus Bradycardia.

## 2019-08-18 LAB
ANION GAP SERPL CALC-SCNC: 4 MMOL/L (ref 5–15)
BASOPHILS # BLD: 0.1 K/UL (ref 0–0.1)
BASOPHILS NFR BLD: 1 % (ref 0–1)
BUN SERPL-MCNC: 8 MG/DL (ref 6–20)
BUN/CREAT SERPL: 9 (ref 12–20)
CALCIUM SERPL-MCNC: 8.1 MG/DL (ref 8.5–10.1)
CHLORIDE SERPL-SCNC: 111 MMOL/L (ref 97–108)
CO2 SERPL-SCNC: 26 MMOL/L (ref 21–32)
CREAT SERPL-MCNC: 0.89 MG/DL (ref 0.7–1.3)
DIFFERENTIAL METHOD BLD: ABNORMAL
EOSINOPHIL # BLD: 0.2 K/UL (ref 0–0.4)
EOSINOPHIL NFR BLD: 2 % (ref 0–7)
ERYTHROCYTE [DISTWIDTH] IN BLOOD BY AUTOMATED COUNT: 13.7 % (ref 11.5–14.5)
GLUCOSE SERPL-MCNC: 89 MG/DL (ref 65–100)
HCT VFR BLD AUTO: 35.7 % (ref 36.6–50.3)
HGB BLD-MCNC: 11.6 G/DL (ref 12.1–17)
IMM GRANULOCYTES # BLD AUTO: 0.1 K/UL (ref 0–0.04)
IMM GRANULOCYTES NFR BLD AUTO: 1 % (ref 0–0.5)
LYMPHOCYTES # BLD: 2.3 K/UL (ref 0.8–3.5)
LYMPHOCYTES NFR BLD: 20 % (ref 12–49)
MCH RBC QN AUTO: 32.9 PG (ref 26–34)
MCHC RBC AUTO-ENTMCNC: 32.5 G/DL (ref 30–36.5)
MCV RBC AUTO: 101.1 FL (ref 80–99)
MONOCYTES # BLD: 0.7 K/UL (ref 0–1)
MONOCYTES NFR BLD: 6 % (ref 5–13)
NEUTS SEG # BLD: 8 K/UL (ref 1.8–8)
NEUTS SEG NFR BLD: 71 % (ref 32–75)
NRBC # BLD: 0 K/UL (ref 0–0.01)
NRBC BLD-RTO: 0 PER 100 WBC
PLATELET # BLD AUTO: 130 K/UL (ref 150–400)
PMV BLD AUTO: 10.8 FL (ref 8.9–12.9)
POTASSIUM SERPL-SCNC: 3.9 MMOL/L (ref 3.5–5.1)
RBC # BLD AUTO: 3.53 M/UL (ref 4.1–5.7)
SODIUM SERPL-SCNC: 141 MMOL/L (ref 136–145)
TSH SERPL DL<=0.05 MIU/L-ACNC: 1.96 UIU/ML (ref 0.36–3.74)
WBC # BLD AUTO: 11.3 K/UL (ref 4.1–11.1)

## 2019-08-18 PROCEDURE — 97161 PT EVAL LOW COMPLEX 20 MIN: CPT

## 2019-08-18 PROCEDURE — 36415 COLL VENOUS BLD VENIPUNCTURE: CPT

## 2019-08-18 PROCEDURE — 74011250637 HC RX REV CODE- 250/637: Performed by: HOSPITALIST

## 2019-08-18 PROCEDURE — 74011000250 HC RX REV CODE- 250: Performed by: HOSPITALIST

## 2019-08-18 PROCEDURE — 85025 COMPLETE CBC W/AUTO DIFF WBC: CPT

## 2019-08-18 PROCEDURE — 74011000250 HC RX REV CODE- 250: Performed by: INTERNAL MEDICINE

## 2019-08-18 PROCEDURE — 97535 SELF CARE MNGMENT TRAINING: CPT

## 2019-08-18 PROCEDURE — 80048 BASIC METABOLIC PNL TOTAL CA: CPT

## 2019-08-18 PROCEDURE — 94760 N-INVAS EAR/PLS OXIMETRY 1: CPT

## 2019-08-18 PROCEDURE — 97165 OT EVAL LOW COMPLEX 30 MIN: CPT

## 2019-08-18 PROCEDURE — 74011250636 HC RX REV CODE- 250/636: Performed by: INTERNAL MEDICINE

## 2019-08-18 PROCEDURE — 84443 ASSAY THYROID STIM HORMONE: CPT

## 2019-08-18 PROCEDURE — 65660000000 HC RM CCU STEPDOWN

## 2019-08-18 PROCEDURE — 77010033678 HC OXYGEN DAILY

## 2019-08-18 PROCEDURE — 97530 THERAPEUTIC ACTIVITIES: CPT

## 2019-08-18 PROCEDURE — 74011250636 HC RX REV CODE- 250/636: Performed by: HOSPITALIST

## 2019-08-18 PROCEDURE — 74011000258 HC RX REV CODE- 258: Performed by: INTERNAL MEDICINE

## 2019-08-18 RX ORDER — AMOXICILLIN AND CLAVULANATE POTASSIUM 875; 125 MG/1; MG/1
1 TABLET, FILM COATED ORAL EVERY 12 HOURS
Status: DISCONTINUED | OUTPATIENT
Start: 2019-08-18 | End: 2019-08-20 | Stop reason: HOSPADM

## 2019-08-18 RX ADMIN — AMOXICILLIN AND CLAVULANATE POTASSIUM 1 TABLET: 875; 125 TABLET, FILM COATED ORAL at 21:07

## 2019-08-18 RX ADMIN — SODIUM CHLORIDE 150 ML/HR: 900 INJECTION, SOLUTION INTRAVENOUS at 03:27

## 2019-08-18 RX ADMIN — POLYETHYLENE GLYCOL-3350 AND ELECTROLYTES 2000 ML: 236; 6.74; 5.86; 2.97; 22.74 POWDER, FOR SOLUTION ORAL at 17:22

## 2019-08-18 RX ADMIN — CEFEPIME HYDROCHLORIDE 2 G: 2 INJECTION, POWDER, FOR SOLUTION INTRAVENOUS at 05:25

## 2019-08-18 RX ADMIN — ONDANSETRON 4 MG: 2 INJECTION INTRAMUSCULAR; INTRAVENOUS at 21:12

## 2019-08-18 RX ADMIN — FAMOTIDINE 20 MG: 10 INJECTION, SOLUTION INTRAVENOUS at 21:08

## 2019-08-18 RX ADMIN — Medication 10 ML: at 21:14

## 2019-08-18 RX ADMIN — ACETAMINOPHEN 650 MG: 325 TABLET ORAL at 21:20

## 2019-08-18 RX ADMIN — Medication 10 ML: at 05:27

## 2019-08-18 RX ADMIN — Medication 10 ML: at 07:39

## 2019-08-18 RX ADMIN — Medication 10 ML: at 13:37

## 2019-08-18 RX ADMIN — CEFEPIME HYDROCHLORIDE 2 G: 2 INJECTION, POWDER, FOR SOLUTION INTRAVENOUS at 13:37

## 2019-08-18 RX ADMIN — ENOXAPARIN SODIUM 120 MG: 120 INJECTION SUBCUTANEOUS at 06:06

## 2019-08-18 NOTE — PROGRESS NOTES
Patient seen by OT. Cleared by nursing for activity. Reports he runs fab in mid 40's to 50's baseline and 2 years ago had extensive workup to look into any needed intervention. Post walking HR 85% and identified feeling SOB O2 95% on room air. Educated on energy conservation and self identifying need for rest breaks. Does not need acute followup as is able to mobilize and do ADL, however PT will assess and my decide to follow to increase endurance for physiocal activity.

## 2019-08-18 NOTE — CONSULTS
[facility]    Name:  Carolina Montalvo  MR#:  [id number]  :  1945  ACCOUNT #:  [de-identified]  DATE OF SERVICE:  2019    REASON FOR CONSULTATION:  Rectal bleeding. HISTORY OF PRESENT ILLNESS:  The patient is a pleasant 79-year-old  man, who was admitted with urosepsis on 08/15. After initial stay in the ICU and treatment with IV antibiotics, the patient has been transferred to the floor. His ICU stay was complicated by atrial fibrillation with rapid ventricular response, which resolved and was felt to be triggered by the sepsis and no long-term treatment has been advised for this. He is currently still on antibiotics and relates that this morning, he had a single episode with a large amount of fresh blood in the toilet bowl. There was no chest pain, dizziness, or dyspnea with this, and he denies any abdominal pain. He does admit that just prior to his coming to the hospital with the septic episode, he did have several episodes of diarrhea at home and may have had episodic hematochezia at home over the past few weeks. He also thinks he may have had an episode of rectal bleeding in the ICU, but is not clear on this because he says he was somewhat confused during that  part of his hospitalization. Of note is the fact that he had a colonoscopy in , which he did not have complete records of, but apparently had a small polyp removed then, but he does not recall being told he had any other issues and does not remember ever being told he had diverticular disease. There is no family history of colon cancer that he is aware of or of inflammatory bowel disease. He has a hemoglobin of 11.6 today, hemoglobin was 12.8 on 08/15; white count is down to 11.3 from a peak of 21,000 on this admission; platelet count is 489 and his INR was 1.1 with a pro-time of 10.8 during this hospitalization.   Of note is the fact that a CT scan of the abdomen and pelvis on this admission demonstrated no significant colonic or bowel abnormalities and was essentially negative except for a right inguinal hernia and it has been felt that his urinary tract infection was triggered by prostatic hypertrophy. PAST MEDICAL HISTORY:  Remarkable for his general good health and the patient has had history of urosepsis as mentioned with past history of an elevated PSA, hyperlipidemia, bradycardia, of which a cardiac workup was negative and a history of skin lesion of his face. SOCIAL HISTORY:  He is a former smoker, quit smoking many years ago. Drinks about one glass of wine per day. He is a retired . He is  and lives in [facility] FAMILY HISTORY:  Noncontributory. His father did have a history of alcohol abuse and his mother was fairly healthy. REVIEW OF SYSTEMS:  No seizures or syncope. He did have fever, chills, and weakness on admission, this is improved. He has had some occasional cough, but none in the past day or so. He does admit to diminished appetite, but no nausea, vomiting, or diarrhea. Previously mentioned has resolved, otherwise review of systems is negative or noncontributory across all other systems. PHYSICAL EXAMINATION:  GENERAL:  Physical exam reveals him to be a well-developed, healthy-appearing gentleman. He is alert and oriented x3, comfortable, and in no acute distress. VITAL SIGNS:  Reveal a pulse of 85, blood pressure is 128/74, temperature is 97.8, respiratory rate was 20. HEENT:  Reveals no scleral icterus. NECK:  Supple. CHEST:  Clear. HEART:  Regular rate and rhythm. ABDOMEN:  Soft and nondistended. No tenderness. No masses or organomegaly. RECTAL:  Deferred. EXTREMITIES:  No clubbing, cyanosis, or edema. NEUROLOGIC:  Grossly intact. LABORATORY DATA:  As mentioned showed the hemoglobin of 11.6. His serum chemistries are pertinent for BUN of 8, creatinine 0.89. Liver enzymes were normal.    IMPRESSION:  1. Hematochezia, this may be hemorrhoidal.  Certainly it is possible the patient has developed a mild ischemic colitis, but he has no pain at this point which would make this less common and his white count is actually coming down. Diverticular bleeding is certainly a possibility, but he does not recall ever having diverticulosis and there was no mention of this on the CT scan on this admission. He does have a prior history of a colonic polyp as mentioned above. 2.  Urosepsis, improving. 3.  Atrial fibrillation with rapid ventricular response, resolved. PLAN:  We would recommend the patient be evaluated with colonoscopy to further determine the cause of the bleeding and this will be arranged for tomorrow. Discussed this with the patient. He understands the rationale and agrees with this and we will prep him today. Thanks very much for allowing us to see the patient. Daly Aquino MD      [id number]  D:  08/18/2019 10:37  T:  08/18/2019 13:54  JOB #:  [id number]  CC:   Yin Dalton MD

## 2019-08-18 NOTE — PROGRESS NOTES
OCCUPATIONAL THERAPY EVALUATION/DISCHARGE  Patient: Armen Gusman (82 y.o. male)  Date: 8/18/2019  Primary Diagnosis: Septic shock ([facility] [A41.9, R65.21]  UTI (urinary tract infection) [N39.0]  CASSIE (acute kidney injury) ([facility] [N17.9]       Precautions: fall      ASSESSMENT  Based on the objective data described below, the patient presents with independent level ADL and mobility with need for education on energy conservation. Has new onset BLE and scrotal swelling and abnormal ECG. Cleared by nursing for activity. Able to perform ADLs. Fatigued with mobility and HR up from 47-85, oxygen 95% on room air. Emphasized importance of energy conservation and self identifying fatigue prior to urgent need to rest. Discussed also with nurse    Current Level of Function (ADLs/self-care): independent    Functional Outcome Measure: The patient scored 85/100 on the Barthel Index outcome measure which is indicative of 15% deficit with ADL/mobility. Other factors to consider for discharge: will need education on new cardiac condition     PLAN :  Recommend with staff: remind patient to self pace    Recommendation for discharge: (in order for the patient to meet his/her long term goals)  No skilled occupational therapy/ follow up rehabilitation needs identified at this time. This discharge recommendation:  A follow-up discussion with the attending provider and/or case management is planned    Equipment recommendations for successful discharge: none       SUBJECTIVE:   Patient stated I realized I was feeling tired earlier than when I told you.     OBJECTIVE DATA SUMMARY:   HISTORY:   Past Medical History:   Diagnosis Date    Bradycardia     Dyslipidemia     Elevated PSA     Skin lesion of face     UTI (urinary tract infection)    History reviewed. No pertinent surgical history.     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  Expanded or extensive additional review of patient history:   Home Situation  Home Environment: Private residence  # Steps to Enter: 6  Wheelchair Ramp: Yes  One/Two Story Residence: One story  Living Alone: No  Support Systems: /, Child(kin), Family member(s), Spouse/Significant Other/Partner  Patient Expects to be Discharged to[de-identified] Private residence  Current DME Used/Available at Home: (wife has walker, wheelchair, crutches)  Tub or Shower Type: Shower    Hand dominance: Right    EXAMINATION OF PERFORMANCE DEFICITS:  Cognitive/Behavioral Status:            alert          Skin: intact    Edema: BLE, scrotum    Hearing: Auditory  Auditory Impairment: Hard of hearing, bilateral  Hearing Aids/Status: Left, With patient, Right, At home    Vision/Perceptual:                           Acuity: Within Defined Limits         Range of Motion:    AROM: Within functional limits  PROM: Within functional limits                      Strength:    Strength: Within functional limits                Coordination:  Coordination: Within functional limits  Fine Motor Skills-Upper: Left Intact; Right Intact    Gross Motor Skills-Upper: Left Intact; Right Intact    Tone & Sensation:    Tone: Normal  Sensation: Intact                      Balance:  Sitting: Intact  Standing: Intact    Functional Mobility and Transfers for ADLs:  Bed Mobility:       Transfers:  Sit to Stand: Independent  Stand to Sit: Independent  Bathroom Mobility: Independent  Toilet Transfer : Independent    ADL Assessment:  Feeding: Independent    Oral Facial Hygiene/Grooming: Independent    Bathing: Independent    Upper Body Dressing: Independent    Lower Body Dressing: Independent    Toileting: Independent                ADL Intervention and task modifications:    Lower Body Dressing Assistance  Socks:  Independent  Position Performed: Bending forward method;Seated in chair    Patient instructed and indicated understanding energy conservation techniques to increase independence and safety during ADLs. Recommend reinforcement. Functional Measure:  Barthel Index:    Bathin  Bladder: 10  Bowels: 10  Groomin  Dressing: 10  Feeding: 10  Mobility: 10  Stairs: 5  Toilet Use: 10  Transfer (Bed to Chair and Back): 15  Total: 85/100        Percentage of impairment   0%   1-19%   20-39%   40-59%   60-79%   80-99%   100%   Barthel Score 0-100 100 99-80 79-60 59-40 20-39 1-19   0     The Barthel ADL Index: Guidelines  1. The index should be used as a record of what a patient does, not as a record of what a patient could do. 2. The main aim is to establish degree of independence from any help, physical or verbal, however minor and for whatever reason. 3. The need for supervision renders the patient not independent. 4. A patient's performance should be established using the best available evidence. Asking the patient, friends/relatives and nurses are the usual sources, but direct observation and common sense are also important. However direct testing is not needed. 5. Usually the patient's performance over the preceding 24-48 hours is important, but occasionally longer periods will be relevant. 6. Middle categories imply that the patient supplies over 50 per cent of the effort. 7. Use of aids to be independent is allowed. Samaria Bolton., Barthel, D.W. (0064). Functional evaluation: the Barthel Index. [facility] (14)2. MARBIN Kessler, Lenora White., Bita Chakraborty., [address] (). Measuring the change indisability after inpatient rehabilitation; comparison of the responsiveness of the Barthel Index and Functional Gillespie Measure. Journal of Neurology, Neurosurgery, and Psychiatry, 66(4), 130-385. Trudy Simon, N.J.A, Mark Vu,  W.J.M, & Mary Roldan, M.A. (2004.) Assessment of post-stroke quality of life in cost-effectiveness studies: The usefulness of the Barthel Index and the EuroQoL-5D.  Quality of Life Research, 13, 500-60       Occupational Therapy Evaluation Charge Determination   History Examination Decision-Making   LOW Complexity : Brief history review  LOW Complexity : 1-3 performance deficits relating to physical, cognitive , or psychosocial skils that result in activity limitations and / or participation restrictions  LOW Complexity : No comorbidities that affect functional and no verbal or physical assistance needed to complete eval tasks       Based on the above components, the patient evaluation is determined to be of the following complexity level: LOW   Pain Rating:  minor LE discomfort with walking    Activity Tolerance:   Fair  Please refer to the flowsheet for vital signs taken during this treatment. After treatment patient left in no apparent distress:    Sitting in chair and Bed / chair alarm activated    COMMUNICATION/EDUCATION:   The patients plan of care was discussed with: Physical Therapist and Registered Nurse.     Thank you for this referral.  Mery Valdivia  Time Calculation: 17 mins

## 2019-08-18 NOTE — PROGRESS NOTES
Spoke with Dr. Luis Li from [facility]. Dr. Luis Li stated that he will be by to see the patient today.

## 2019-08-18 NOTE — PROGRESS NOTES
Problem: Sepsis: Day 3  Goal: *Oxygen saturation within defined limits  Outcome: Progressing Towards Goal  Goal: *Vital sign stability  Outcome: Progressing Towards Goal  Goal: *Demonstrates progressive activity  Outcome: Progressing Towards Goal  Goal: Activity/Safety  Outcome: Progressing Towards Goal     Problem: Falls - Risk of  Goal: *Absence of Falls  Description  Document Cas Lu Fall Risk and appropriate interventions in the flowsheet.   Outcome: Progressing Towards Goal  Note:   Fall Risk Interventions:  Mobility Interventions: Assess mobility with egress test         Medication Interventions: Assess postural VS orthostatic hypotension    Elimination Interventions: Bed/chair exit alarm

## 2019-08-18 NOTE — PROGRESS NOTES
PHYSICAL THERAPY EVALUATION/DISCHARGE  Patient: German Howard (31 y.o. male)  Date: 8/18/2019  Primary Diagnosis: [facility]ic [facility] ([facility] [A41.9, R65.21]  UTI (urinary tract infection) [N39.0]  CASSIE (acute kidney injury) ([facility] [N17.9]       Precautions:          ASSESSMENT  Based on the objective data described below, the patient presents with 4+ BLE pitting edema, decreased activity tolerance with PATEL. However, this does not impact his functional mobility as he remains independent and stable. He did c/o moderate PATEL, although this resolved with rest periods as expected. Pt did desaturate lowest 86% at top of stairs, although poor reading noted by author's pulse oximeter. Otherwise, VS stable with expected rise in HR with activity. Pt educated in length on monitoring VS via use of portable pulse oximeter, energy conservation, RPE scale, use of scale to monitor weight, and importance of maintaining mobility throughout remainder of admission. Other factors to consider for discharge: 6 minute walk test should be completed by RT, as well as consideration of OP cardiac rehab if decreased activity tolerance continues      Further skilled acute physical therapy is not indicated at this time. PLAN :  Recommendation for discharge: (in order for the patient to meet his/her long term goals)  No skilled physical therapy/ follow up rehabilitation needs identified at this time. OP cardiac rehab is likely appropriate     This discharge recommendation:  Has not yet been discussed the attending provider and/or case management    Equipment recommendations for successful discharge: none       SUBJECTIVE:   Patient stated You have been so helpful.     OBJECTIVE DATA SUMMARY:   HISTORY:    Past Medical History:   Diagnosis Date    Bradycardia     Dyslipidemia     Elevated PSA     Skin lesion of face     UTI (urinary tract infection)    History reviewed. No pertinent surgical history.     Prior level of function: I community ambulator, with negative subjective fall screen. Personal factors and/or comorbidities impacting plan of care: none    Home Situation  Home Environment: Private residence  # Steps to Enter: 6  Wheelchair Ramp: Yes  One/Two Story Residence: One story  Living Alone: No  Support Systems: /, Child(kin), Family member(s), Spouse/Significant Other/Partner  Patient Expects to be Discharged to[de-identified] Private residence  Current DME Used/Available at Home: (wife has walker, wheelchair, crutches)  Tub or Shower Type: Shower    EXAMINATION/PRESENTATION/DECISION MAKING:   Critical Behavior:  Neurologic State: Alert  Orientation Level: Appropriate for age  Cognition: Appropriate decision making  Safety/Judgement: Awareness of environment  Hearing: Auditory  Auditory Impairment: Hard of hearing, bilateral  Hearing Aids/Status: Left, With patient, Right, At home  Edema: 4+ pitting BLE   Range Of Motion:  AROM: Within functional limits           PROM: Within functional limits           Strength:    Strength: Within functional limits                    Tone & Sensation:   Tone: Normal              Sensation: Intact               Coordination:  Coordination: Within functional limits  Vision:   Acuity: Within Defined Limits  Functional Mobility:     Transfers:  Sit to Stand: Independent  Stand to Sit: Independent                       Balance:   Sitting: Intact  Standing: Intact  Ambulation/Gait Training:  Distance (ft): 250 Feet (ft)  Assistive Device: Gait belt  Ambulation - Level of Assistance: Independent     Gait Description (WDL): Exceptions to WDL                              Stairs:  Number of Stairs Trained: 24  Stairs - Level of Assistance: Independent   Rail Use: Right   Activity Tolerance:   Fair, requires rest breaks and observed SOB with activity  Please refer to the flowsheet for vital signs taken during this treatment.     After treatment patient left in no apparent distress:   Sitting in chair and Caregiver / family present    COMMUNICATION/EDUCATION:   The patients plan of care was discussed with: Certified Occupational Therapy Assistant and Registered Nurse. Fall prevention education was provided and the patient/caregiver indicated understanding., Patient/family have participated as able in goal setting and plan of care. and Patient/family agree to work toward stated goals and plan of care.     Thank you for this referral.  Denisha Mejias, PT, DPT   Board-Certified Geriatric Clinical Specialist   Certified Exercise Expert for Aging Adults      Time Calculation: 30 mins

## 2019-08-18 NOTE — PROGRESS NOTES
Talked MD Dannielle Jaramillo form cardiology about the patient's heart rate dropping into the 40's. Pasquale stated that this heart rate is a baseline for the patient and cardiology is aware.

## 2019-08-18 NOTE — PROGRESS NOTES
Hospitalist Progress Note    NAME: Ronnie Varela   :  1945   MRN:  [id number]       Assessment / Plan:    Rectal bleed noticed today  -monitor H/H  -GI consulted    Volume overload scrotal swelling  -gained 9 pound since admission, on  cc/hr  -pt has good oral intake and maintaining BP, will d/c fluids  -scrotal elevation    Severe sepsis with septic shock, POA, resolved  UTI POA, being treated  -Urine cx grew Citrobacter Koseri  -off levo, transferred to [address] [place] [address] yesterday  -on empiric cefepime day 3, will switch to PO abx    Acute renal failure POA  -resolved    Afib with RVR   Was placed on amio drip without bolus. -now NSR.  -Cardiology evaluation aprreciation  -ECHO   -currently full dose lovenox,  Anticoagulation per cardiology    Sinus bradycardia, chronic baseline HR 40-50s. Has seen Dr. Eyal Hester in 2017 with negative stress test, echo EF 65% with grade 1 diastolic dysfunction    BPH with hx elevated PSA. Bladder scan done at [facility] 250ml    Obese  Body mass index is 32.56 kg/m².     Code: FULL Code. DVT prophylaxis: heparin  Surrogate decision maker:  Wife Sai Damon       Subjective:     Chief Complaint / Reason for Physician Visit  Off levo, was on amio, on hold due to bradycardia. No symptoms of afib overnight    Review of Systems:  Symptom Y/N Comments  Symptom Y/N Comments   Fever/Chills n   Chest Pain n    Poor Appetite n   Edema n    Cough n   Abdominal Pain n    Sputum    Joint Pain     SOB/PATEL    Pruritis/Rash     Nausea/vomit n   Tolerating PT/OT     Diarrhea    Tolerating Diet     Constipation    Other x Bloody BM     Could NOT obtain due to:      Objective:     VITALS:   Last 24hrs VS reviewed since prior progress note.  Most recent are:  Patient Vitals for the past 24 hrs:   Temp Pulse Resp BP SpO2   19 0848  85   95 %   19 0806 97.8 °F (36.6 °C) (!) 47 20 128/74 95 %   19 0329 97.5 °F (36.4 °C) (!) 50 20 131/75 96 %   19 2258 98.3 °F (36.8 °C) 68 20 122/86 96 %   08/17/19 1905 98.2 °F (36.8 °C) (!) 53 20 134/70 96 %   08/17/19 1811 97.5 °F (36.4 °C) (!) 57 21 131/81 96 %   08/17/19 1700  (!) 56 25 121/73 98 %   08/17/19 1600 98.4 °F (36.9 °C) (!) 53 20 116/66 96 %   08/17/19 1500  (!) 52 19 115/70 97 %   08/17/19 1400  (!) 53 20 111/66 96 %   08/17/19 1300  63 22 114/70 95 %   08/17/19 1221 98.6 °F (37 °C)       08/17/19 1200  (!) 56 21 110/66 99 %   08/17/19 1130  (!) 56 16 112/67 97 %       Intake/Output Summary (Last 24 hours) at 8/18/2019 1044  Last data filed at 8/18/2019 0818  Gross per 24 hour   Intake 2255 ml   Output 1905 ml   Net 350 ml        PHYSICAL EXAM:  General: WD, WN. Alert, cooperative, no acute distress    EENT:  EOMI. Anicteric sclerae. MMM  Resp:  CTA bilaterally, no wheezing or rales. No accessory muscle use  CV:  Regular  rhythm,  No edema, fab  GI:  Soft, Non distended, Non tender.  +Bowel sounds  Neurologic:  Alert and oriented X 3, normal speech, cn 2-12 grossly intact  Psych:   Not anxious nor agitated  Skin:  No rashes. No jaundice  Genitourinary:  Scrotal swelling    Reviewed most current lab test results and cultures  YES  Reviewed most current radiology test results   YES  Review and summation of old records today    NO  Reviewed patient's current orders and MAR    YES  PMH/SH reviewed - no change compared to H&P  ________________________________________________________________________  Care Plan discussed with:    Comments   Patient x    Family      RN x    Care Manager     Consultant                        Multidiciplinary team rounds were held today with , nursing, pharmacist and clinical coordinator. Patient's plan of care was discussed; medications were reviewed and discharge planning was addressed.      ________________________________________________________________________  Total NON critical care TIME:  25  Minutes    Total CRITICAL CARE TIME Spent:   Minutes non procedure based      Comments >50% of visit spent in counseling and coordination of care     ________________________________________________________________________  Estelita Holden MD     Procedures: see electronic medical records for all procedures/Xrays and details which were not copied into this note but were reviewed prior to creation of Plan. LABS:  I reviewed today's most current labs and imaging studies.   Pertinent labs include:  Recent Labs     08/18/19 0323 08/17/19 0418 08/16/19  0430   WBC 11.3* 16.2* 21.0*   HGB 11.6* 11.4* 12.0*   HCT 35.7* 34.6* 35.3*   * 117* 121*     Recent Labs     08/18/19 0323 08/17/19  0418 08/16/19  0430 08/15/19  1138    140 143 141   K 3.9 3.8 4.4 4.1   * 110* 114* 110*   CO2 26 24 23 24   GLU 89 99 121* 101*   BUN 8 9 15 16   CREA 0.89 0.94 1.33* 1.83*   CA 8.1* 7.7* 7.3* 7.9*   MG  --  1.7 1.6  --    PHOS  --  1.7* 1.9*  --    ALB  --  2.7* 2.9* 3.2*   TBILI  --  0.6 0.9 0.6   SGOT  --  30 31 34   ALT  --  28 31 29       Signed: Estelita Holden MD

## 2019-08-18 NOTE — PROGRESS NOTES
RAPID RESPONSE TEAM-Recent CCU Transfer    Rounded on patient due to recent transfer out of CCU on 8/17. Discussed with primary RN, Nick Mason. No acute concerns. No patient complaints. Vitals stable. MEWS 1. No RRT interventions indicated at this time. RN encouraged to call with any questions or concerns.     1956 \[facility] [place] [facility]\""  Rapid Response RN  Cruz Quispe

## 2019-08-18 NOTE — PROGRESS NOTES
8/18/2019 10:25 AM    Admit Date: 8/15/2019    Admit Diagnosis: Septic shock ([facility].) [A41.9, R65.21];UTI (urinary tract infection) [N39.0]; CASSIE (acute kidney injury) ([facility].) [N17.9]    Subjective:     German Howard denies chest pain, chest pressure/discomfort, dyspnea, palpitations, irregular heart beats, near-syncope, syncope, fatigue, orthopnea, paroxysmal nocturnal dyspnea, exertional chest pressure/discomfort. Episode of GI bleed. Visit Vitals  /74 (BP 1 Location: Left arm, BP Patient Position: At rest)   Pulse 85   Temp 97.8 °F (36.6 °C)   Resp 20   Ht 6' (1.829 m)   Wt 258 lb 4.8 oz (117.2 kg)   SpO2 95%   BMI 35.03 kg/m²     Current Facility-Administered Medications   Medication Dose Route Frequency    peg 3350-electrolytes (COLYTE) 4000 mL  2,000 mL Oral BID    cefepime (MAXIPIME) 2 g in 0.9% sodium chloride (MBP/ADV) 100 mL  2 g IntraVENous Q8H    enoxaparin (LOVENOX) injection 120 mg  120 mg SubCUTAneous Q12H    sodium chloride (NS) flush 5-40 mL  5-40 mL IntraVENous Q8H    sodium chloride (NS) flush 5-40 mL  5-40 mL IntraVENous PRN    acetaminophen (TYLENOL) tablet 650 mg  650 mg Oral Q6H PRN    ondansetron (ZOFRAN) injection 4 mg  4 mg IntraVENous Q6H PRN    famotidine (PF) (PEPCID) 20 mg in sodium chloride 0.9% 10 mL injection  20 mg IntraVENous Q24H         Objective:      Visit Vitals  /74 (BP 1 Location: Left arm, BP Patient Position: At rest)   Pulse 85   Temp 97.8 °F (36.6 °C)   Resp 20   Ht 6' (1.829 m)   Wt 258 lb 4.8 oz (117.2 kg)   SpO2 95%   BMI 35.03 kg/m²       Physical Exam:  Abdomen: soft, non-tender.  Bowel sounds normal.   Extremities: no cyanosis or edema  Heart: regular rate and rhythm, S1, S2 normal, no murmur, click, rub or gallop  Lungs: clear to auscultation bilaterally  Neurologic: Grossly normal    Data Review:   Labs:    Recent Results (from the past 24 hour(s))   ECHO ADULT COMPLETE    Collection Time: 08/17/19 10:36 AM   Result Value Ref Range Tapse 2.36 (A) 1.5 - 2.0 cm    Aortic Valve Systolic Peak Velocity 824.96 cm/s    AoV VTI 24.31 cm    Aortic Valve Area by Continuity of Peak Velocity 2.8 cm2    Aortic Valve Area by Continuity of VTI 2.8 cm2    AoV PG 6.5 mmHg    LVIDd 5.62 4.2 - 5.9 cm    LVPWd 0.94 0.6 - 1.0 cm    LVIDs 3.93 cm    IVSd 1.10 (A) 0.6 - 1.0 cm    LVOT d 2.44 cm    LVOT Peak Velocity 76.10 cm/s    LVOT Peak Gradient 2.3 mmHg    LVOT VTI 14.68 cm    MVA (PHT) 1.9 cm2    MV A Byron 37.38 cm/s    MV E Byron 55.16 cm/s    MV E/A 1.48     MV Mean Gradient 0.5 mmHg    Mitral Valve Annulus Velocity Time Integral 18.77 cm    Pulmonic Valve Systolic Mean Gradient 1.5 mmHg    Pulmonic Valve Systolic Velocity Time Integral 17.17 cm    Aortic Valve Systolic Mean Gradient 3.6 mmHg    LV Mass .5 (A) 88 - 224 g    LV Mass AL Index 150.2 (A) 49 - 115 g/m2    MV Peak Gradient 1.5 mmHg    Est. RA Pressure 10.0 mmHg    Mitral Valve E Wave Deceleration Time 389.3 ms    Mitral Valve Pressure Half-time 117.2 ms    Left Atrium Major Axis 4.00 cm    Pulmonic Valve Max Velocity 83.95 cm/s    Mitral Valve Max Velocity 61.36 cm/s    MVA VTI 3.7 cm2    LVOT SV 68.6 ml    IVA/BSA Pk Byron 1.2 cm2/m2    IVA/BSA VTI 1.2 cm2/m2    AV Cusp 2.46 cm    PV peak gradient 2.8 mmHg   METABOLIC PANEL, BASIC    Collection Time: 08/18/19  3:23 AM   Result Value Ref Range    Sodium 141 136 - 145 mmol/L    Potassium 3.9 3.5 - 5.1 mmol/L    Chloride 111 (H) 97 - 108 mmol/L    CO2 26 21 - 32 mmol/L    Anion gap 4 (L) 5 - 15 mmol/L    Glucose 89 65 - 100 mg/dL    BUN 8 6 - 20 MG/DL    Creatinine 0.89 0.70 - 1.30 MG/DL    BUN/Creatinine ratio 9 (L) 12 - 20      GFR est AA >60 >60 ml/min/1.73m2    GFR est non-AA >60 >60 ml/min/1.73m2    Calcium 8.1 (L) 8.5 - 10.1 MG/DL   CBC WITH AUTOMATED DIFF    Collection Time: 08/18/19  3:23 AM   Result Value Ref Range    WBC 11.3 (H) 4.1 - 11.1 K/uL    RBC 3.53 (L) 4.10 - 5.70 M/uL    HGB 11.6 (L) 12.1 - 17.0 g/dL    HCT 35.7 (L) 36.6 - 50.3 %    .1 (H) 80.0 - 99.0 FL    MCH 32.9 26.0 - 34.0 PG    MCHC 32.5 30.0 - 36.5 g/dL    RDW 13.7 11.5 - 14.5 %    PLATELET 729 (L) 573 - 400 K/uL    MPV 10.8 8.9 - 12.9 FL    NRBC 0.0 0  WBC    ABSOLUTE NRBC 0.00 0.00 - 0.01 K/uL    NEUTROPHILS 71 32 - 75 %    LYMPHOCYTES 20 12 - 49 %    MONOCYTES 6 5 - 13 %    EOSINOPHILS 2 0 - 7 %    BASOPHILS 1 0 - 1 %    IMMATURE GRANULOCYTES 1 (H) 0.0 - 0.5 %    ABS. NEUTROPHILS 8.0 1.8 - 8.0 K/UL    ABS. LYMPHOCYTES 2.3 0.8 - 3.5 K/UL    ABS. MONOCYTES 0.7 0.0 - 1.0 K/UL    ABS. EOSINOPHILS 0.2 0.0 - 0.4 K/UL    ABS. BASOPHILS 0.1 0.0 - 0.1 K/UL    ABS. IMM. GRANS. 0.1 (H) 0.00 - 0.04 K/UL    DF AUTOMATED     TSH 3RD GENERATION    Collection Time: 08/18/19  3:23 AM   Result Value Ref Range    TSH 1.96 0.36 - 3.74 uIU/mL       Telemetry: normal sinus rhythm      Assessment:     Active Problems:    UTI (urinary tract infection) (8/15/2019)      CASSIE (acute kidney injury) ([facility] (8/15/2019)      Septic shock ([facility] (8/15/2019)        Plan:     1. Transient a. Fib: most likely due to septic shock. Remains in SR. Echo noted. Monitor. Dc lovenox due to GI bleed concern. 2. Septic shock resolved.

## 2019-08-19 ENCOUNTER — ANESTHESIA EVENT (OUTPATIENT)
Dept: ENDOSCOPY | Age: 74
DRG: 871 | End: 2019-08-19
Payer: MEDICARE

## 2019-08-19 ENCOUNTER — ANESTHESIA (OUTPATIENT)
Dept: ENDOSCOPY | Age: 74
DRG: 871 | End: 2019-08-19
Payer: MEDICARE

## 2019-08-19 LAB
ANION GAP SERPL CALC-SCNC: 6 MMOL/L (ref 5–15)
BASOPHILS # BLD: 0.1 K/UL (ref 0–0.1)
BASOPHILS NFR BLD: 1 % (ref 0–1)
BUN SERPL-MCNC: 7 MG/DL (ref 6–20)
BUN/CREAT SERPL: 8 (ref 12–20)
CALCIUM SERPL-MCNC: 8.2 MG/DL (ref 8.5–10.1)
CHLORIDE SERPL-SCNC: 110 MMOL/L (ref 97–108)
CO2 SERPL-SCNC: 26 MMOL/L (ref 21–32)
CREAT SERPL-MCNC: 0.85 MG/DL (ref 0.7–1.3)
DIFFERENTIAL METHOD BLD: ABNORMAL
EOSINOPHIL # BLD: 0.2 K/UL (ref 0–0.4)
EOSINOPHIL NFR BLD: 2 % (ref 0–7)
ERYTHROCYTE [DISTWIDTH] IN BLOOD BY AUTOMATED COUNT: 13.2 % (ref 11.5–14.5)
GLUCOSE SERPL-MCNC: 82 MG/DL (ref 65–100)
HCT VFR BLD AUTO: 33 % (ref 36.6–50.3)
HGB BLD-MCNC: 11.2 G/DL (ref 12.1–17)
IMM GRANULOCYTES # BLD AUTO: 0.1 K/UL (ref 0–0.04)
IMM GRANULOCYTES NFR BLD AUTO: 1 % (ref 0–0.5)
LYMPHOCYTES # BLD: 2.1 K/UL (ref 0.8–3.5)
LYMPHOCYTES NFR BLD: 27 % (ref 12–49)
MCH RBC QN AUTO: 32.8 PG (ref 26–34)
MCHC RBC AUTO-ENTMCNC: 33.9 G/DL (ref 30–36.5)
MCV RBC AUTO: 96.8 FL (ref 80–99)
MONOCYTES # BLD: 0.5 K/UL (ref 0–1)
MONOCYTES NFR BLD: 7 % (ref 5–13)
NEUTS SEG # BLD: 4.9 K/UL (ref 1.8–8)
NEUTS SEG NFR BLD: 62 % (ref 32–75)
NRBC # BLD: 0 K/UL (ref 0–0.01)
NRBC BLD-RTO: 0 PER 100 WBC
PLATELET # BLD AUTO: 139 K/UL (ref 150–400)
PMV BLD AUTO: 11.1 FL (ref 8.9–12.9)
POTASSIUM SERPL-SCNC: 3.7 MMOL/L (ref 3.5–5.1)
RBC # BLD AUTO: 3.41 M/UL (ref 4.1–5.7)
SODIUM SERPL-SCNC: 142 MMOL/L (ref 136–145)
WBC # BLD AUTO: 7.9 K/UL (ref 4.1–11.1)

## 2019-08-19 PROCEDURE — 74011000250 HC RX REV CODE- 250: Performed by: HOSPITALIST

## 2019-08-19 PROCEDURE — 74011250637 HC RX REV CODE- 250/637: Performed by: HOSPITALIST

## 2019-08-19 PROCEDURE — 36415 COLL VENOUS BLD VENIPUNCTURE: CPT

## 2019-08-19 PROCEDURE — 74011250637 HC RX REV CODE- 250/637: Performed by: INTERNAL MEDICINE

## 2019-08-19 PROCEDURE — 0DJD8ZZ INSPECTION OF LOWER INTESTINAL TRACT, VIA NATURAL OR ARTIFICIAL OPENING ENDOSCOPIC: ICD-10-PCS | Performed by: INTERNAL MEDICINE

## 2019-08-19 PROCEDURE — 76040000019: Performed by: INTERNAL MEDICINE

## 2019-08-19 PROCEDURE — 74011250636 HC RX REV CODE- 250/636: Performed by: HOSPITALIST

## 2019-08-19 PROCEDURE — 76060000031 HC ANESTHESIA FIRST 0.5 HR: Performed by: INTERNAL MEDICINE

## 2019-08-19 PROCEDURE — 65660000000 HC RM CCU STEPDOWN

## 2019-08-19 PROCEDURE — 94760 N-INVAS EAR/PLS OXIMETRY 1: CPT

## 2019-08-19 PROCEDURE — 74011250636 HC RX REV CODE- 250/636: Performed by: ANESTHESIOLOGY

## 2019-08-19 PROCEDURE — 85025 COMPLETE CBC W/AUTO DIFF WBC: CPT

## 2019-08-19 PROCEDURE — 80048 BASIC METABOLIC PNL TOTAL CA: CPT

## 2019-08-19 PROCEDURE — 74011250636 HC RX REV CODE- 250/636: Performed by: INTERNAL MEDICINE

## 2019-08-19 RX ORDER — EPINEPHRINE 0.1 MG/ML
1 INJECTION INTRACARDIAC; INTRAVENOUS
Status: DISCONTINUED | OUTPATIENT
Start: 2019-08-19 | End: 2019-08-19 | Stop reason: HOSPADM

## 2019-08-19 RX ORDER — PROPOFOL 10 MG/ML
INJECTION, EMULSION INTRAVENOUS AS NEEDED
Status: DISCONTINUED | OUTPATIENT
Start: 2019-08-19 | End: 2019-08-19 | Stop reason: HOSPADM

## 2019-08-19 RX ORDER — FLUMAZENIL 0.1 MG/ML
0.2 INJECTION INTRAVENOUS
Status: DISCONTINUED | OUTPATIENT
Start: 2019-08-19 | End: 2019-08-19 | Stop reason: HOSPADM

## 2019-08-19 RX ORDER — SODIUM CHLORIDE 0.9 % (FLUSH) 0.9 %
5-40 SYRINGE (ML) INJECTION EVERY 8 HOURS
Status: DISCONTINUED | OUTPATIENT
Start: 2019-08-19 | End: 2019-08-20 | Stop reason: SDUPTHER

## 2019-08-19 RX ORDER — HYDROCORTISONE ACETATE 25 MG/1
25 SUPPOSITORY RECTAL EVERY 12 HOURS
Status: DISCONTINUED | OUTPATIENT
Start: 2019-08-19 | End: 2019-08-19

## 2019-08-19 RX ORDER — SODIUM CHLORIDE 0.9 % (FLUSH) 0.9 %
5-40 SYRINGE (ML) INJECTION AS NEEDED
Status: DISCONTINUED | OUTPATIENT
Start: 2019-08-19 | End: 2019-08-20 | Stop reason: SDUPTHER

## 2019-08-19 RX ORDER — SODIUM CHLORIDE 9 MG/ML
25 INJECTION, SOLUTION INTRAVENOUS CONTINUOUS
Status: DISPENSED | OUTPATIENT
Start: 2019-08-19 | End: 2019-08-19

## 2019-08-19 RX ORDER — HYDROCORTISONE 25 MG/G
CREAM TOPICAL 2 TIMES DAILY
Status: DISCONTINUED | OUTPATIENT
Start: 2019-08-19 | End: 2019-08-20 | Stop reason: HOSPADM

## 2019-08-19 RX ORDER — NALOXONE HYDROCHLORIDE 0.4 MG/ML
0.4 INJECTION, SOLUTION INTRAMUSCULAR; INTRAVENOUS; SUBCUTANEOUS
Status: DISCONTINUED | OUTPATIENT
Start: 2019-08-19 | End: 2019-08-19 | Stop reason: HOSPADM

## 2019-08-19 RX ORDER — LIDOCAINE HYDROCHLORIDE 20 MG/ML
INJECTION, SOLUTION EPIDURAL; INFILTRATION; INTRACAUDAL; PERINEURAL AS NEEDED
Status: DISCONTINUED | OUTPATIENT
Start: 2019-08-19 | End: 2019-08-19 | Stop reason: HOSPADM

## 2019-08-19 RX ORDER — DEXTROMETHORPHAN/PSEUDOEPHED 2.5-7.5/.8
1.2 DROPS ORAL
Status: DISCONTINUED | OUTPATIENT
Start: 2019-08-19 | End: 2019-08-19 | Stop reason: HOSPADM

## 2019-08-19 RX ORDER — ATROPINE SULFATE 0.1 MG/ML
0.5 INJECTION INTRAVENOUS
Status: DISCONTINUED | OUTPATIENT
Start: 2019-08-19 | End: 2019-08-19 | Stop reason: HOSPADM

## 2019-08-19 RX ADMIN — Medication 10 ML: at 14:43

## 2019-08-19 RX ADMIN — AMOXICILLIN AND CLAVULANATE POTASSIUM 1 TABLET: 875; 125 TABLET, FILM COATED ORAL at 08:42

## 2019-08-19 RX ADMIN — SIMETHICONE 80 MG: 20 SUSPENSION/ DROPS ORAL at 15:41

## 2019-08-19 RX ADMIN — SODIUM CHLORIDE 25 ML/HR: 900 INJECTION, SOLUTION INTRAVENOUS at 15:21

## 2019-08-19 RX ADMIN — AMOXICILLIN AND CLAVULANATE POTASSIUM 1 TABLET: 875; 125 TABLET, FILM COATED ORAL at 21:50

## 2019-08-19 RX ADMIN — Medication 10 ML: at 06:16

## 2019-08-19 RX ADMIN — ACETAMINOPHEN 650 MG: 325 TABLET ORAL at 08:42

## 2019-08-19 RX ADMIN — LIDOCAINE HYDROCHLORIDE 40 MG: 20 INJECTION, SOLUTION EPIDURAL; INFILTRATION; INTRACAUDAL; PERINEURAL at 15:35

## 2019-08-19 RX ADMIN — HYDROCORTISONE: 25 CREAM TOPICAL at 21:52

## 2019-08-19 RX ADMIN — FAMOTIDINE 20 MG: 10 INJECTION, SOLUTION INTRAVENOUS at 21:50

## 2019-08-19 RX ADMIN — PROPOFOL 220 MG: 10 INJECTION, EMULSION INTRAVENOUS at 15:52

## 2019-08-19 RX ADMIN — Medication 10 ML: at 21:52

## 2019-08-19 NOTE — PROGRESS NOTES
Rianna Logan  1945  [id number]    Situation:  Verbal report received from: Rahel Smith RN  Procedure: Procedure(s):  COLONOSCOPY    Background:    Preoperative diagnosis: Hematochezia [K92.1]  Postoperative diagnosis: diverticulosis, hemorrhoids    :  Dr. Jan Cameron  Assistant(s): Endoscopy Technician-1: General Seguin  Endoscopy RN-1: Raz Nelson RN    Specimens: * No specimens in log *  H. Pylori  no    Assessment:  Intra-procedure medications       Anesthesia gave intra-procedure sedation and medications, see anesthesia flow sheet yes    Intravenous fluids: NS@ KVO     Vital signs stable       Abdominal assessment: round and soft       Recommendation:  Discharge patient per MD order  .   Return to floor yes  Room 2225  Family or Friend   no  Permission to share finding with family or friend n/a

## 2019-08-19 NOTE — PROGRESS NOTES
8/19/2019 8:10 AM    Admit Date: 8/15/2019    Admit Diagnosis: Septic shock ([facility] [A41.9, R65.21];UTI (urinary tract infection) [N39.0]; CASSIE (acute kidney injury) ([facility] [N17.9]    Subjective:     No cardiac events. For endoscopy today. Visit Vitals  /77 (BP 1 Location: Right arm, BP Patient Position: At rest)   Pulse (!) 42   Temp 97.7 °F (36.5 °C)   Resp 18   Ht 6' (1.829 m)   Wt 254 lb 9.6 oz (115.5 kg)   SpO2 98%   BMI 34.53 kg/m²     Current Facility-Administered Medications   Medication Dose Route Frequency    amoxicillin-clavulanate (AUGMENTIN) 875-125 mg per tablet 1 Tab  1 Tab Oral Q12H    sodium chloride (NS) flush 5-40 mL  5-40 mL IntraVENous Q8H    sodium chloride (NS) flush 5-40 mL  5-40 mL IntraVENous PRN    acetaminophen (TYLENOL) tablet 650 mg  650 mg Oral Q6H PRN    ondansetron (ZOFRAN) injection 4 mg  4 mg IntraVENous Q6H PRN    famotidine (PF) (PEPCID) 20 mg in sodium chloride 0.9% 10 mL injection  20 mg IntraVENous Q24H         Objective:      Visit Vitals  /77 (BP 1 Location: Right arm, BP Patient Position: At rest)   Pulse (!) 42   Temp 97.7 °F (36.5 °C)   Resp 18   Ht 6' (1.829 m)   Wt 254 lb 9.6 oz (115.5 kg)   SpO2 98%   BMI 34.53 kg/m²       Physical Exam:  Abdomen: soft, non-tender.  Bowel sounds normal.   Extremities: no cyanosis or edema  Heart: regular rate and rhythm, S1, S2 normal, no murmur, click, rub or gallop  Lungs: clear to auscultation bilaterally  Neurologic: Grossly normal    Data Review:   Labs:    Recent Results (from the past 24 hour(s))   METABOLIC PANEL, BASIC    Collection Time: 08/19/19  3:55 AM   Result Value Ref Range    Sodium 142 136 - 145 mmol/L    Potassium 3.7 3.5 - 5.1 mmol/L    Chloride 110 (H) 97 - 108 mmol/L    CO2 26 21 - 32 mmol/L    Anion gap 6 5 - 15 mmol/L    Glucose 82 65 - 100 mg/dL    BUN 7 6 - 20 MG/DL    Creatinine 0.85 0.70 - 1.30 MG/DL    BUN/Creatinine ratio 8 (L) 12 - 20      GFR est AA >60 >60 ml/min/1.73m2 GFR est non-AA >60 >60 ml/min/1.73m2    Calcium 8.2 (L) 8.5 - 10.1 MG/DL   CBC WITH AUTOMATED DIFF    Collection Time: 08/19/19  3:55 AM   Result Value Ref Range    WBC 7.9 4.1 - 11.1 K/uL    RBC 3.41 (L) 4.10 - 5.70 M/uL    HGB 11.2 (L) 12.1 - 17.0 g/dL    HCT 33.0 (L) 36.6 - 50.3 %    MCV 96.8 80.0 - 99.0 FL    MCH 32.8 26.0 - 34.0 PG    MCHC 33.9 30.0 - 36.5 g/dL    RDW 13.2 11.5 - 14.5 %    PLATELET 969 (L) 024 - 400 K/uL    MPV 11.1 8.9 - 12.9 FL    NRBC 0.0 0  WBC    ABSOLUTE NRBC 0.00 0.00 - 0.01 K/uL    NEUTROPHILS 62 32 - 75 %    LYMPHOCYTES 27 12 - 49 %    MONOCYTES 7 5 - 13 %    EOSINOPHILS 2 0 - 7 %    BASOPHILS 1 0 - 1 %    IMMATURE GRANULOCYTES 1 (H) 0.0 - 0.5 %    ABS. NEUTROPHILS 4.9 1.8 - 8.0 K/UL    ABS. LYMPHOCYTES 2.1 0.8 - 3.5 K/UL    ABS. MONOCYTES 0.5 0.0 - 1.0 K/UL    ABS. EOSINOPHILS 0.2 0.0 - 0.4 K/UL    ABS. BASOPHILS 0.1 0.0 - 0.1 K/UL    ABS. IMM. GRANS. 0.1 (H) 0.00 - 0.04 K/UL    DF AUTOMATED         Telemetry: SB      Assessment:     Active Problems:    UTI (urinary tract infection) (8/15/2019)      CASSIE (acute kidney injury) ([facility] (8/15/2019)      Septic shock ([facility] (8/15/2019)        Plan:     1. Transient a. Fib: most likely due to septic shock. Remains in SR. Ok to dc from cardiac standpoint.

## 2019-08-19 NOTE — PROGRESS NOTES
Problem: Patient Education: Go to Patient Education Activity  Goal: Patient/Family Education  Outcome: Progressing Towards Goal     Problem: Sepsis: Day of Diagnosis  Goal: Activity/Safety  Outcome: Progressing Towards Goal     Problem: Falls - Risk of  Goal: *Absence of Falls  Description  Document Nayely Fox Fall Risk and appropriate interventions in the flowsheet.   Outcome: Progressing Towards Goal  Note:   Fall Risk Interventions:  Mobility Interventions: Assess mobility with egress test         Medication Interventions: Teach patient to arise slowly    Elimination Interventions: Call light in reach              Problem: Patient Education: Go to Patient Education Activity  Goal: Patient/Family Education  Outcome: Progressing Towards Goal

## 2019-08-19 NOTE — PROGRESS NOTES
TRANSFER - OUT REPORT:    Verbal report given to Laura(name) on Esther Hernández  being transferred to [facility](unit) for routine progression of care       Report consisted of patients Situation, Background, Assessment and   Recommendations(SBAR). Information from the following report(s) SBAR and Procedure Summary was reviewed with the receiving nurse. Lines:   Peripheral IV 08/15/19 Right Antecubital (Active)   Site Assessment Clean, dry, & intact 8/19/2019  4:05 PM   Phlebitis Assessment 0 8/19/2019  4:05 PM   Infiltration Assessment 0 8/19/2019  4:05 PM   Dressing Status Clean, dry, & intact 8/19/2019  4:05 PM   Dressing Type Transparent;Tape 8/19/2019  2:00 PM   Hub Color/Line Status Green 8/19/2019  2:00 PM   Alcohol Cap Used Yes 8/17/2019  4:00 AM        Opportunity for questions and clarification was provided.

## 2019-08-19 NOTE — PROGRESS NOTES
Bedside shift change report GIVEN TO Basilio Duarte RN. Report included the following information SBAR. SIGNIFICANT CHANGES DURING SHIFT:          CONCERNS TO ADDRESS WITH MD:              [facility] NURSING NOTE   Admission Date 8/15/2019   Admission Diagnosis Septic shock ([facility] [A41.9, R65.21]  UTI (urinary tract infection) [N39.0]  CASSIE (acute kidney injury) ([facility] [N17.9]   Consults IP CONSULT TO HOSPITALIST  IP CONSULT TO CARDIOLOGY  IP CONSULT TO GASTROENTEROLOGY      Cardiac Monitoring [x] Yes [] No      Purposeful Hourly Rounding [x] Yes    Rosa Score Total Score: 1   Rosa score 3 or > [] Bed Alarm [] Avasys [] 1:1 sitter [] Patient refused (Signed refusal form in chart)   Junior Score Junior Score: 20   Junior score 14 or < [] PMT consult [] Wound Care consult    []  Specialty bed  [] Nutrition consult      Influenza Vaccine Received Flu Vaccine for Current Season (usually Sept-March): Not Flu Season           Oxygen needs? [x] Room air Oxygen @  []1L    []2L    []3L   []4L    []5L   []6L via  NC   Chronic home O2 use? [] Yes [] No  Perform O2 challenge test and document in progress note using smartphrase (.Homeoxygen)      Last bowel movement Last Bowel Movement Date: 08/18/19      Urinary Catheter             LDAs               Peripheral IV 08/15/19 Right Antecubital (Active)   Site Assessment Clean, dry, & intact 8/18/2019  7:37 PM   Phlebitis Assessment 0 8/18/2019  7:37 PM   Infiltration Assessment 0 8/18/2019  7:37 PM   Dressing Status Clean, dry, & intact 8/18/2019  7:37 PM   Dressing Type Transparent 8/18/2019  7:37 PM   Hub Color/Line Status Green 8/18/2019  7:37 PM   Alcohol Cap Used Yes 8/17/2019  4:00 AM                         Readmission Risk Assessment Tool Score Low Risk            10       Total Score        3 Has Seen PCP in Last 6 Months (Yes=3, No=0)    2 . Living with Significant Other. Assisted Living. LTAC. SNF. or   Rehab    5 Pt.  Coverage (Medicare=5 , Medicaid, or Self-Pay=4) Criteria that do not apply:    Patient Length of Stay (>5 days = 3)    IP Visits Last 12 Months (1-3=4, 4=9, >4=11)    Charlson Comorbidity Score (Age + Comorbid Conditions)       Expected Length of Stay 4d 19h   Actual Length of Stay 3

## 2019-08-19 NOTE — PROGRESS NOTES
0700: Bedside shift change report given to MARYANA Null RN (oncoming nurse) by Albina Goldberg RN (offgoing nurse). Report included the following information SBAR and Kardex. 0830: Cardio MD at bedside, pt is stable from his standpoint, cardio clears him for d/c.    1010: Paged Dr. Cecilie Bernheim about colonoscopy order, awaiting call back. 1120: Spoke to Dr. Cecilie Bernheim, instructed to call [facility] office, called [facility] office, Jones Donato states she will get pt an order from on call MD.    1230: Spoke to Endo, Endo staff to speak to MD present and call back. [phone]: Endo called spoke to GABRIEL CORMIER [facility], pt scheduled at [place] [id number] today. 1500: Pt off the floor. 1700: Pt back in the room. 1830: Paged Dr. Rachell Hawkins about scrotum swelling, recommended to keep it elevated. 1900:   Bedside shift change report GIVEN TO Albina Goldberg RN. Report included the following information SBAR and Kardex. SIGNIFICANT CHANGES DURING SHIFT:  Pt had colonoscopy today, now on a cardiac diet      CONCERNS TO ADDRESS WITH MD:  D/c planning? [facility] NURSING NOTE   Admission Date 8/15/2019   Admission Diagnosis Septic shock ([facility] [A41.9, R65.21]  UTI (urinary tract infection) [N39.0]  CASSIE (acute kidney injury) ([facility] [N17.9]   Consults IP CONSULT TO HOSPITALIST  IP CONSULT TO CARDIOLOGY  IP CONSULT TO GASTROENTEROLOGY      Cardiac Monitoring [x] Yes [] No      Purposeful Hourly Rounding [x] Yes    Rosa Score Total Score: 1   Rosa score 3 or > [] Bed Alarm [] Avasys [] 1:1 sitter [] Patient refused (Signed refusal form in chart)   Junior Score Junior Score: 20   Junior score 14 or < [] PMT consult [] Wound Care consult    []  Specialty bed  [] Nutrition consult      Influenza Vaccine Received Flu Vaccine for Current Season (usually Sept-March): Not Flu Season           Oxygen needs? [x] Room air Oxygen @  []1L    []2L    []3L   []4L    []5L   []6L via  NC   Chronic home O2 use?  [] Yes [] No  Perform O2 challenge test and document in progress note using smartphrase (.Homeoxygen)      Last bowel movement Last Bowel Movement Date: 08/19/19      Urinary Catheter             LDAs               Peripheral IV 08/15/19 Right Antecubital (Active)   Site Assessment Clean, dry, & intact 8/19/2019  4:05 PM   Phlebitis Assessment 0 8/19/2019  4:05 PM   Infiltration Assessment 0 8/19/2019  4:05 PM   Dressing Status Clean, dry, & intact 8/19/2019  4:05 PM   Dressing Type Transparent;Tape 8/19/2019  2:00 PM   Hub Color/Line Status Green 8/19/2019  2:00 PM   Alcohol Cap Used Yes 8/17/2019  4:00 AM                         Readmission Risk Assessment Tool Score Low Risk            10       Total Score        3 Has Seen PCP in Last 6 Months (Yes=3, No=0)    2 . Living with Significant Other. Assisted Living. LTAC. SNF. or   Rehab    5 Pt.  Coverage (Medicare=5 , Medicaid, or Self-Pay=4)        Criteria that do not apply:    Patient Length of Stay (>5 days = 3)    IP Visits Last 12 Months (1-3=4, 4=9, >4=11)    Charlson Comorbidity Score (Age + Comorbid Conditions)       Expected Length of Stay 4d 19h   Actual Length of Stay 4

## 2019-08-19 NOTE — ANESTHESIA POSTPROCEDURE EVALUATION
Procedure(s):  COLONOSCOPY.    total IV anesthesia    Anesthesia Post Evaluation        Patient location during evaluation: PACU  Note status: Adequate. Level of consciousness: responsive to verbal stimuli and sleepy but conscious  Pain management: satisfactory to patient  Airway patency: patent  Anesthetic complications: no  Cardiovascular status: acceptable  Respiratory status: acceptable  Hydration status: acceptable  Comments: +Post-Anesthesia Evaluation and Assessment    Patient: Caro Sarkar MRN: [id number]  SSN: [id number]   YOB: 1945  Age: 75 y.o. Sex: male      Cardiovascular Function/Vital Signs    BP (!) 127/94   Pulse (!) 56   Temp 36.8 °C (98.2 °F)   Resp 15   Ht 6' (1.829 m)   Wt 115.5 kg (254 lb 9.6 oz)   SpO2 92%   BMI 34.53 kg/m²     Patient is status post Procedure(s):  COLONOSCOPY. Nausea/Vomiting: Controlled. Postoperative hydration reviewed and adequate. Pain:  Pain Scale 1: Numeric (0 - 10) (08/19/19 1607)  Pain Intensity 1: 0 (08/19/19 1607)   Managed. Neurological Status: At baseline. Mental Status and Level of Consciousness: Arousable. Pulmonary Status:   O2 Device: Room air (08/19/19 1612)   Adequate oxygenation and airway patent. Complications related to anesthesia: None    Post-anesthesia assessment completed. No concerns. Signed By: Haroon Schmidt DO    8/19/2019  Post anesthesia nausea and vomiting:  controlled      Vitals Value Taken Time   /94 8/19/2019  4:10 PM   Temp 36.8 °C (98.2 °F) 8/19/2019  4:00 PM   Pulse 56 8/19/2019  4:12 PM   Resp 15 8/19/2019  4:12 PM   SpO2 90 % 8/19/2019  4:13 PM   Vitals shown include unvalidated device data.

## 2019-08-19 NOTE — PROGRESS NOTES
Hospitalist Progress Note    NAME: Ronnie Varela   :  1945   MRN:  [id number]       Assessment / Plan:    Rectal bleed noticed today  -H/H stable  -GI consult appreciate, plan for colonoscopy today    Volume overload scrotal swelling, improving  -gained 9 pound since admission, off fluids since yesterday  -scrotal elevation  -monitor I and O    Severe sepsis with septic shock, POA, resolved  UTI POA, being treated  -Urine cx grew Citrobacter Koseri  -off levo, transferred to [facility] on   -treated with empiric cefepime for 3 days, transitioned to po Augmentin on . Acute renal failure POA  -resolved    Afib with RVR   Was placed on amio drip without bolus. -now NSR.  -Cardiology evaluation aprreciation  -ECHO   -currently full dose lovenox,  Anticoagulation per cardiology    Sinus bradycardia, chronic baseline HR 40-50s. Has seen Dr. Eyal Hester in 2017 with negative stress test, echo EF 65% with grade 1 diastolic dysfunction    BPH with hx elevated PSA. Bladder scan done at [facility] 250ml    Obese  Body mass index is 32.56 kg/m².     Code: FULL Code. DVT prophylaxis: heparin  Surrogate decision maker:  Wife Sai Damon       Subjective:     Chief Complaint / Reason for Physician Visit  Off levo, was on amio, on hold due to bradycardia. No symptoms of afib overnight    Review of Systems:  Symptom Y/N Comments  Symptom Y/N Comments   Fever/Chills n   Chest Pain n    Poor Appetite n   Edema n    Cough n   Abdominal Pain n    Sputum    Joint Pain     SOB/PATEL    Pruritis/Rash     Nausea/vomit n   Tolerating PT/OT     Diarrhea    Tolerating Diet     Constipation    Other x Bloody BM     Could NOT obtain due to:      Objective:     VITALS:   Last 24hrs VS reviewed since prior progress note.  Most recent are:  Patient Vitals for the past 24 hrs:   Temp Pulse Resp BP SpO2   19 1047 98.1 °F (36.7 °C) (!) 41 20 121/71 97 %   19 0744 97.7 °F (36.5 °C) (!) 42 18 128/77 98 %   19 0427 97.9 °F (36.6 °C) (!) 42 18 118/63 94 %   08/18/19 2254 97.8 °F (36.6 °C) (!) 45 18 130/80 92 %   08/18/19 1855 97.8 °F (36.6 °C) (!) 46 20 114/71 96 %   08/18/19 1508 97.6 °F (36.4 °C) (!) 45 20 104/64 94 %   08/18/19 1139 97.7 °F (36.5 °C) (!) 49 21 114/75 93 %       Intake/Output Summary (Last 24 hours) at 8/19/2019 1054  Last data filed at 8/19/2019 0018  Gross per 24 hour   Intake 3750 ml   Output 1400 ml   Net 2350 ml        PHYSICAL EXAM:  General: WD, WN. Alert, cooperative, no acute distress    EENT:  EOMI. Anicteric sclerae. MMM  Resp:  CTA bilaterally, no wheezing or rales. No accessory muscle use  CV:  Regular  rhythm,  No edema, fab  GI:  Soft, Non distended, Non tender.  +Bowel sounds  Neurologic:  Alert and oriented X 3, normal speech, cn 2-12 grossly intact  Psych:   Not anxious nor agitated  Skin:  No rashes. No jaundice  Genitourinary:  Scrotal swelling, Improving    Reviewed most current lab test results and cultures  YES  Reviewed most current radiology test results   YES  Review and summation of old records today    NO  Reviewed patient's current orders and MAR    YES  PMH/ reviewed - no change compared to H&P  ________________________________________________________________________  Care Plan discussed with:    Comments   Patient x    Family      RN x    Care Manager     Consultant                        Multidiciplinary team rounds were held today with , nursing, pharmacist and clinical coordinator. Patient's plan of care was discussed; medications were reviewed and discharge planning was addressed.      ________________________________________________________________________  Total NON critical care TIME:  25  Minutes    Total CRITICAL CARE TIME Spent:   Minutes non procedure based      Comments   >50% of visit spent in counseling and coordination of care     ________________________________________________________________________  Ana Cooper MD     Procedures: see electronic medical records for all procedures/Xrays and details which were not copied into this note but were reviewed prior to creation of Plan. LABS:  I reviewed today's most current labs and imaging studies.   Pertinent labs include:  Recent Labs     08/19/19 0355 08/18/19 0323 08/17/19 0418   WBC 7.9 11.3* 16.2*   HGB 11.2* 11.6* 11.4*   HCT 33.0* 35.7* 34.6*   * 130* 117*     Recent Labs     08/19/19 0355 08/18/19 0323 08/17/19 0418    141 140   K 3.7 3.9 3.8   * 111* 110*   CO2 26 26 24   GLU 82 89 99   BUN 7 8 9   CREA 0.85 0.89 0.94   CA 8.2* 8.1* 7.7*   MG  --   --  1.7   PHOS  --   --  1.7*   ALB  --   --  2.7*   TBILI  --   --  0.6   SGOT  --   --  30   ALT  --   --  28       Signed: Yaquelin Rodrigues MD

## 2019-08-19 NOTE — PROGRESS NOTES
Spoke to patient - He is aware and agreeable to colonoscopy that is planned for 1600 today with Dr Gila Ferguson. Plan for endoscopy to come get him before then. All questions were answered. He informed his wife of update in plan. He remains NPO.

## 2019-08-19 NOTE — PROGRESS NOTES
Problem: Sepsis: Day 4  Goal: Activity/Safety  Outcome: Progressing Towards Goal  Goal: Nutrition/Diet  Outcome: Progressing Towards Goal  Goal: Medications  Outcome: Progressing Towards Goal  Goal: Respiratory  Outcome: Progressing Towards Goal  Goal: *Oxygen saturation within defined limits  Outcome: Progressing Towards Goal     Problem: Falls - Risk of  Goal: *Absence of Falls  Description  Document Rosa Fall Risk and appropriate interventions in the flowsheet.   Outcome: Progressing Towards Goal  Note:   Fall Risk Interventions:  Mobility Interventions: Assess mobility with egress test         Medication Interventions: Teach patient to arise slowly    Elimination Interventions: Bed/chair exit alarm

## 2019-08-19 NOTE — PROCEDURES
[facility]                  Colonoscopy Operative Report    8/19/2019      Marija Stapleton  [id number]  1945    Procedure Type:   Colonoscopy --diagnostic     Indications:    rectal bleeding     Pre-operative Diagnosis: see indication above    Post-operative Diagnosis:  See findings below    :  Cecil Frias MD    Referring Provider: Jose Worthy PA-C      Sedation:  MAC anesthesia Propofol    Pre-Procedural Exam:      Airway: clear,  No airway problems anticipated  Heart: RRR, without gallops or rubs  Lungs: clear bilaterally without wheezes, crackles, or rhonchi  Abdomen: soft, nontender, nondistended, bowel sounds present  Mental Status: awake, alert and oriented to person, place and time     Procedure Details:  After informed consent was obtained with all risks and benefits of procedure explained and preoperative exam completed, the patient was taken to the endoscopy suite and placed in the left lateral decubitus position. Upon sequential sedation as per above, a digital rectal exam was performed . The Olympus videocolonoscope  was inserted in the rectum and carefully advanced to the cecum, which was identified by the ileocecal valve and appendiceal orifice. The cecum was identified by the ileocecal valve and appendiceal orifice. The quality of preparation was adequate. The colonoscope was slowly withdrawn with careful evaluation between folds. Retroflexion in the rectum was completed demonstrating internal hemorrhoids. Findings:   Rectum: Grade 2 internal hemorrhoid(s); Sigmoid:     - Diverticulosis, mild  Descending Colon: normal  Transverse Colon: normal  Ascending Colon: normal  Cecum: normal  Terminal Ileum: not intubated      Specimen Removed:  none    Complications: None. EBL:  None. Impression:    1. large internal hemorrhoids---suspect that is what bled  2.  Mild sigmoid diverticulosis    Recommendations: --Hemorrhoidal supp BID for 14 days. If more bleeding then consider rubber band ligation of the hemorrhoids. High fiber diet. Resume normal medication(s). Yossi Herbert MD    8/19/2019     BESSIE White MD  [facility],  Piyush [facility]  [place], [address] [place] [address]  [phone]  [email or web address]

## 2019-08-19 NOTE — PROGRESS NOTES
Patient in stable condition, and transporting back to inpatient room 2225 per [facility] per stretcher.

## 2019-08-19 NOTE — ANESTHESIA PREPROCEDURE EVALUATION
Relevant Problems   No relevant active problems       Anesthetic History   No history of anesthetic complications            Review of Systems / Medical History  Patient summary reviewed, nursing notes reviewed and pertinent labs reviewed    Pulmonary                Comments: Distant smoking hx, 5 pk yrs, quit in 1967   Neuro/Psych   Within defined limits           Cardiovascular            Dysrhythmias (Bradycardia into the 40's, asymptomatic, negative cardiac  workup) : atrial fibrillation  Hyperlipidemia    Exercise tolerance: >4 METS  Comments: 8-17-19 ECHO:  56-60% EF      8-16-19 EKG: AFib with RVR, rate of 110   GI/Hepatic/Renal               Comments: Hematochezia, hgb 11.2 Endo/Other        Obesity     Other Findings            Physical Exam    Airway  Mallampati: III  TM Distance: 4 - 6 cm  Neck ROM: normal range of motion   Mouth opening: Normal     Cardiovascular  Regular rate and rhythm,  S1 and S2 normal,  no murmur, click, rub, or gallop             Dental    Dentition: Lower partial plate and Full upper dentures     Pulmonary  Breath sounds clear to auscultation               Abdominal  GI exam deferred       Other Findings            Anesthetic Plan    ASA: 2  Anesthesia type: total IV anesthesia          Induction: Intravenous  Anesthetic plan and risks discussed with: Patient

## 2019-08-19 NOTE — PROGRESS NOTES
Bedside and Verbal shift change report GIVEN TO Riley Wright RN. Report included the following information SBAR, Intake/Output, MAR and Cardiac Rhythm nsr; sinus fab. SIGNIFICANT CHANGES DURING SHIFT:        CONCERNS TO ADDRESS WITH MD:            [address] NURSING NOTE   Admission Date 8/15/2019   Admission Diagnosis Septic shock ([facility] [A41.9, R65.21]  UTI (urinary tract infection) [N39.0]  CASSIE (acute kidney injury) ([facility] [N17.9]   Consults IP CONSULT TO HOSPITALIST  IP CONSULT TO CARDIOLOGY  IP CONSULT TO GASTROENTEROLOGY      Cardiac Monitoring [x] Yes [] No      Purposeful Hourly Rounding [x] Yes    Rosa Score Total Score: 1   Rosa score 3 or > [] Bed Alarm [] Avasys [] 1:1 sitter [] Patient refused (Signed refusal form in chart)   Junior Score Junior Score: 20   Junior score 14 or < [] PMT consult [] Wound Care consult    []  Specialty bed  [] Nutrition consult      Influenza Vaccine Received Flu Vaccine for Current Season (usually Sept-March): Not Flu Season           Oxygen needs? [x] Room air Oxygen @  []1L    []2L    []3L   []4L    []5L   []6L via  NC   Chronic home O2 use? [] Yes [] No  Perform O2 challenge test and document in progress note using [employer] (.Homeoxygen)      Last bowel movement Last Bowel Movement Date: 08/18/19      Urinary Catheter             LDAs               Peripheral IV 08/15/19 Right Antecubital (Active)   Site Assessment Clean, dry, & intact 8/18/2019  7:37 PM   Phlebitis Assessment 0 8/18/2019  7:37 PM   Infiltration Assessment 0 8/18/2019  7:37 PM   Dressing Status Clean, dry, & intact 8/18/2019  7:37 PM   Dressing Type Transparent 8/18/2019  7:37 PM   Hub Color/Line Status Green 8/18/2019  7:37 PM   Alcohol Cap Used Yes 8/17/2019  4:00 AM                         Readmission Risk Assessment Tool Score Low Risk            10       Total Score        3 Has Seen PCP in Last 6 Months (Yes=3, No=0)    2 . Living with Significant Other. Assisted Living. LTAC. SNF.  or Rehab    5 Pt.  Coverage (Medicare=5 , Medicaid, or Self-Pay=4)        Criteria that do not apply:    Patient Length of Stay (>5 days = 3)    IP Visits Last 12 Months (1-3=4, 4=9, >4=11)    Charlson Comorbidity Score (Age + Comorbid Conditions)       Expected Length of Stay 4d 19h   Actual Length of Stay 3

## 2019-08-20 VITALS
HEIGHT: 72 IN | TEMPERATURE: 97.9 F | RESPIRATION RATE: 18 BRPM | HEART RATE: 52 BPM | OXYGEN SATURATION: 94 % | BODY MASS INDEX: 34.1 KG/M2 | DIASTOLIC BLOOD PRESSURE: 72 MMHG | WEIGHT: 251.77 LBS | SYSTOLIC BLOOD PRESSURE: 142 MMHG

## 2019-08-20 LAB
ANION GAP SERPL CALC-SCNC: 6 MMOL/L (ref 5–15)
BUN SERPL-MCNC: 8 MG/DL (ref 6–20)
BUN/CREAT SERPL: 9 (ref 12–20)
CALCIUM SERPL-MCNC: 8.2 MG/DL (ref 8.5–10.1)
CHLORIDE SERPL-SCNC: 107 MMOL/L (ref 97–108)
CO2 SERPL-SCNC: 27 MMOL/L (ref 21–32)
CREAT SERPL-MCNC: 0.92 MG/DL (ref 0.7–1.3)
GLUCOSE SERPL-MCNC: 83 MG/DL (ref 65–100)
HCT VFR BLD AUTO: 33.6 % (ref 36.6–50.3)
HGB BLD-MCNC: 11.6 G/DL (ref 12.1–17)
POTASSIUM SERPL-SCNC: 3.6 MMOL/L (ref 3.5–5.1)
SODIUM SERPL-SCNC: 140 MMOL/L (ref 136–145)

## 2019-08-20 PROCEDURE — 74011250637 HC RX REV CODE- 250/637: Performed by: HOSPITALIST

## 2019-08-20 PROCEDURE — 94760 N-INVAS EAR/PLS OXIMETRY 1: CPT

## 2019-08-20 PROCEDURE — 80048 BASIC METABOLIC PNL TOTAL CA: CPT

## 2019-08-20 PROCEDURE — 36415 COLL VENOUS BLD VENIPUNCTURE: CPT

## 2019-08-20 PROCEDURE — 74011250636 HC RX REV CODE- 250/636: Performed by: HOSPITALIST

## 2019-08-20 PROCEDURE — 85018 HEMOGLOBIN: CPT

## 2019-08-20 RX ORDER — FUROSEMIDE 40 MG/1
40 TABLET ORAL DAILY
Qty: 7 TAB | Refills: 0 | Status: SHIPPED | OUTPATIENT
Start: 2019-08-20 | End: 2019-10-01 | Stop reason: ALTCHOICE

## 2019-08-20 RX ORDER — HYDROCORTISONE 25 MG/G
CREAM TOPICAL 2 TIMES DAILY
Qty: 30 G | Refills: 0 | Status: SHIPPED | OUTPATIENT
Start: 2019-08-20 | End: 2020-02-26

## 2019-08-20 RX ORDER — HYDROCORTISONE 25 MG/G
CREAM TOPICAL 2 TIMES DAILY
Qty: 30 G | Refills: 0 | Status: SHIPPED | OUTPATIENT
Start: 2019-08-20 | End: 2019-08-20 | Stop reason: SDUPTHER

## 2019-08-20 RX ORDER — ASPIRIN 81 MG/1
81 TABLET ORAL DAILY
Qty: 30 TAB | Refills: 1 | Status: SHIPPED | OUTPATIENT
Start: 2019-08-25 | End: 2019-10-01 | Stop reason: ALTCHOICE

## 2019-08-20 RX ORDER — POTASSIUM CHLORIDE 1500 MG/1
20 TABLET, FILM COATED, EXTENDED RELEASE ORAL DAILY
Qty: 7 TAB | Refills: 0 | Status: SHIPPED | OUTPATIENT
Start: 2019-08-20 | End: 2019-10-01 | Stop reason: ALTCHOICE

## 2019-08-20 RX ORDER — AMOXICILLIN AND CLAVULANATE POTASSIUM 875; 125 MG/1; MG/1
1 TABLET, FILM COATED ORAL EVERY 12 HOURS
Qty: 10 TAB | Refills: 0 | Status: SHIPPED | OUTPATIENT
Start: 2019-08-20 | End: 2019-08-25

## 2019-08-20 RX ORDER — FUROSEMIDE 10 MG/ML
40 INJECTION INTRAMUSCULAR; INTRAVENOUS ONCE
Status: COMPLETED | OUTPATIENT
Start: 2019-08-20 | End: 2019-08-20

## 2019-08-20 RX ORDER — FUROSEMIDE 40 MG/1
40 TABLET ORAL DAILY
Qty: 7 TAB | Refills: 0 | Status: SHIPPED | OUTPATIENT
Start: 2019-08-20 | End: 2019-08-20 | Stop reason: SDUPTHER

## 2019-08-20 RX ADMIN — AMOXICILLIN AND CLAVULANATE POTASSIUM 1 TABLET: 875; 125 TABLET, FILM COATED ORAL at 08:45

## 2019-08-20 RX ADMIN — Medication 10 ML: at 06:20

## 2019-08-20 RX ADMIN — Medication 10 ML: at 10:33

## 2019-08-20 RX ADMIN — FUROSEMIDE 40 MG: 10 INJECTION, SOLUTION INTRAMUSCULAR; INTRAVENOUS at 10:32

## 2019-08-20 RX ADMIN — HYDROCORTISONE: 25 CREAM TOPICAL at 08:46

## 2019-08-20 NOTE — PROGRESS NOTES
Bedside shift change report GIVEN TO Leilani Suazo RN. Report included the following information SBAR. SIGNIFICANT CHANGES DURING SHIFT:          CONCERNS TO ADDRESS WITH MD:              [facility]   Admission Date 8/15/2019   Admission Diagnosis Septic shock ([facility] [A41.9, R65.21]  UTI (urinary tract infection) [N39.0]  CASSIE (acute kidney injury) ([facility] [address] [facility] [N17.9]   Consults IP CONSULT TO HOSPITALIST  IP CONSULT TO CARDIOLOGY  IP CONSULT TO GASTROENTEROLOGY      Cardiac Monitoring [x] Yes [] No      Purposeful Hourly Rounding [x] Yes    Rosa Score Total Score: 1   Rosa score 3 or > [] Bed Alarm [] Avasys [] 1:1 sitter [] Patient refused (Signed refusal form in chart)   Junior Score Junior Score: 20   Junior score 14 or < [] PMT consult [] Wound Care consult    []  Specialty bed  [] Nutrition consult      Influenza Vaccine Received Flu Vaccine for Current Season (usually Sept-March): Not Flu Season           Oxygen needs? [x] Room air Oxygen @  []1L    []2L    []3L   []4L    []5L   []6L via  NC   Chronic home O2 use? [] Yes [] No  Perform O2 challenge test and document in progress note using smartphrase (.Homeoxygen)      Last bowel movement Last Bowel Movement Date: 08/19/19      Urinary Catheter             LDAs               Peripheral IV 08/15/19 Right Antecubital (Active)   Site Assessment Clean, dry, & intact 8/19/2019  7:12 PM   Phlebitis Assessment 0 8/19/2019  7:12 PM   Infiltration Assessment 0 8/19/2019  7:12 PM   Dressing Status Clean, dry, & intact 8/19/2019  7:12 PM   Dressing Type Transparent 8/19/2019  7:12 PM   Hub Color/Line Status Green 8/19/2019  7:12 PM   Alcohol Cap Used Yes 8/17/2019  4:00 AM                         Readmission Risk Assessment Tool Score Low Risk            10       Total Score        3 Has Seen PCP in Last 6 Months (Yes=3, No=0)    2 . Living with Significant Other. Assisted Living. LTAC. SNF. or   Rehab    5 Pt.  Coverage (Medicare=5 , Medicaid, or Self-Pay=4) Criteria that do not apply:    Patient Length of Stay (>5 days = 3)    IP Visits Last 12 Months (1-3=4, 4=9, >4=11)    Charlson Comorbidity Score (Age + Comorbid Conditions)       Expected Length of Stay 4d 19h   Actual Length of Stay 5

## 2019-08-20 NOTE — PROGRESS NOTES
STEVE plan: Pt will discharge home today, transported by his wife in a personal vehicle. Pt is scheduled to follow-up with his PCP. Pt has no post-acute therapy needs at this time and declined [facility] when offered. No further concern indicated at this time. AVS updated. Medicare pt has received, reviewed, and signed 2nd IM letter informing them of their right to appeal the discharge. Signed copy has been placed on pt bedside chart. Care Management Interventions  PCP Verified by CM: Yes  Last Visit to PCP: 01/24/19  Mode of Transport at Discharge: Other (see comment)(wife)  Transition of Care Consult (CM Consult):  Other(PCP f/u)  MyChart Signup: No  Discharge Durable Medical Equipment: No  Physical Therapy Consult: Yes  Occupational Therapy Consult: Yes  Speech Therapy Consult: No  Current Support Network: Lives with Spouse, Own Home, Family Lives Nearby  Confirm Follow Up Transport: Family  Plan discussed with Pt/Family/Caregiver: Yes(disucssed with pt and wife at bedside)  Discharge Location  Discharge Placement: Home with family assistance     ASHLEE Tavarez  Care Manager  [phone]

## 2019-08-20 NOTE — PROGRESS NOTES
Problem: Falls - Risk of  Goal: *Absence of Falls  Description  Document Charline Vee Fall Risk and appropriate interventions in the flowsheet.   Outcome: Progressing Towards Goal  Note:   Fall Risk Interventions:  Mobility Interventions: Assess mobility with egress test         Medication Interventions: Teach patient to arise slowly    Elimination Interventions: Call light in reach

## 2019-08-20 NOTE — PROGRESS NOTES
0700: Bedside shift change report given to MARYANA Null RN (oncoming nurse) by Bruce guevara RN (offgoing nurse). Report included the following information SBAR and Kardex. 1100: RN at bedside for d/c instructions, family present, no questions. 1115: Pt d/c in wheelchair in no acute distress.

## 2019-08-20 NOTE — DISCHARGE SUMMARY
Hospitalist Discharge Summary     Patient ID:  Donavon Nunez  [id number]  6 y.o.  1945  8/15/2019    PCP on record: Anju Marr PA-C    Admit date: 8/15/2019  Discharge date and time: 8/20/2019    DISCHARGE DIAGNOSIS:      Volume overload scrotal swelling, improving    Severe sepsis with septic shock  UTI  Rectal bleed  Acute renal failure  Afib with RVR 08/16  Sinus bradycardia, chronic baseline HR 40-50s.    BPH with hx elevated PSA.    Obese      CONSULTATIONS:  IP CONSULT TO HOSPITALIST  IP CONSULT TO CARDIOLOGY  IP CONSULT TO GASTROENTEROLOGY    Excerpted HPI from H&P of Harinder Espinal MD:  Donavon Nunez is a 75 y.o.  male transferred from [facility] ER to [facility] ER with septic shock. He reports feeling fine until yesterday evening at 9pm developed severe chills, shaking uncontrollably, fever (temp was 112 with home thermometer, he is not sure it is working right). Was nauseated and vomited x 6 tan colored liquid, would have diarrhea whenever he vomited. Weak, no control of body.     Denies abdominal pain, dysuria. Chronic urinary hesitancy from enlarged prostate. No chest pain. Has nonproductive cough for 2-3 weeks, no SOB. Denies rash, recent abx, travel, sick contact.     Outside ER had fever 103, BP in 80s so given IVF 2L, started on levophed and transferred to [facility]. Given zosyn.     We were asked to admit for work up and evaluation of the above problems. ______________________________________________________________________  DISCHARGE SUMMARY/HOSPITAL COURSE:  for full details see H&P, daily progress notes, labs, consult notes.        Patient admitted with Severe sepsis with septic shock due to citrobacter Koseri UTI. Initially patient was treated in ICU with pressors and IVF, he also had CASSIE that resolved with hydration.  He was treated with empiric cefepime for 3 days and when urine cx results were finalized, abx switched to Augmentin that he will continue to complete abx course. Pt went into Afib with RVR on 08/16 that was treated with amio, it was transient episode, he converted back to NSR. Pt was seen by Dr. Bandar Oneil in 2017 with negative stress test, echo on this admission showed EF 65% with grade 1 diastolic dysfunction Will be discharged on baby aspirin that he will be started on 8/25/2019 ( per GI recommendation to hold for 5 days)  Other medical problems he had during this hospitalization was Rectal bleed but H/H remained stable, seen by GI and had colonoscopy that showed large internal hemorrhoids---suspect that is what bled and Mild sigmoid diverticulosis. GI recommended Hemorrhoidal supp BID for 14 days. If more bleeding then consider rubber band ligation of the hemorrhoids. Pt was also noticed to have Volume overload with scrotal swelling, IVF was d/camille and he is treated with lasix with improvement, cont lasix for one week with po potassium supplement. Pt is hemodynamically stable and will be discharged home.      _______________________________________________________________________  Patient seen and examined by me on discharge day. Pertinent Findings:  Gen:    Not in distress  Chest: Clear lungs  CVS:   Regular rhythm. No edema  Abd:  Soft, not distended, not tender  Neuro:  Alert, cn 2-12 grossly intact  _______________________________________________________________________  DISCHARGE MEDICATIONS:   Current Discharge Medication List      START taking these medications    Details   amoxicillin-clavulanate (AUGMENTIN) 875-125 mg per tablet Take 1 Tab by mouth every twelve (12) hours for 5 days. Qty: 10 Tab, Refills: 0      hydrocortisone (ANUSOL-HC) 2.5 % rectal cream Insert  into rectum two (2) times a day. Qty: 30 g, Refills: 0      aspirin delayed-release 81 mg tablet Take 1 Tab by mouth daily. Qty: 30 Tab, Refills: 1      furosemide (LASIX) 40 mg tablet Take 1 Tab by mouth daily.   Qty: 7 Tab, Refills: 0      potassium chloride SR (K-TAB) 20 mEq tablet Take 1 Tab by mouth daily. Qty: 7 Tab, Refills: 0               Patient Follow Up Instructions: Activity: Activity as tolerated  Diet: Cardiac Diet  Wound Care: None needed    Follow-up with PCP.   Follow-up tests/labs: CBC and BMP in one week    Follow-up Information     Follow up With Specialties Details Why Contact Info    Chiquita Christina PA-C Physician Assistant Go on 8/22/2019 For hospital follow up appointment at 1:00PM [address],[facility] Floor  [phone]          ________________________________________________________________    Risk of deterioration: Low    Condition at Discharge:  Stable  __________________________________________________________________    Disposition  Home with family, no needs    ____________________________________________________________________    Code Status: Full Code  ___________________________________________________________________      Total time in minutes spent coordinating this discharge (includes going over instructions, follow-up, prescriptions, and preparing report for sign off to her PCP) :  40 minutes    Signed:  Jeanie Ceballos MD

## 2019-08-21 ENCOUNTER — PATIENT OUTREACH (OUTPATIENT)
Dept: INTERNAL MEDICINE CLINIC | Age: 74
End: 2019-08-21

## 2019-08-21 NOTE — PROGRESS NOTES
Hospital Discharge Follow-Up      Date/Time:  8/21/2019 9:46 AM    Patient was admitted to [facility] on 8/15 and discharged on 8/20 for DX. SEVERE SEPSIS w SEPTIC  CARMELA, UTI. The physician discharge summary was available at the time of outreach. Patient was contacted within 2 business days of discharge. [facility]    Top Challenges reviewed with the provider   Follow-up tests/labs: CBC and BMP in one week    Pt. AMD on file need  2 witness     DDNR \"not signed by physician\"     discharged on baby aspirin that he will be started on 8/25/2019 ( per GI recommendation to hold for 5 days)    seen by GI and had colonoscopy that showed large internal hemorrhoids-and Mild sigmoid diverticulosis. GI recommended Hemorrhoidal supp BID for 14 days. If more bleeding then consider rubber band ligation of the hemorrhoids. Volume overload with scrotal swelling --treated with lasix with improvement, cont lasix for one week with po potassium supplement. Method of communication with provider :chart routing    Inpatient RRAT score: 10  Was this a readmission? no     Disease Specific:   Sepsis    Summary of patient's top problems:  1. SEVERE SEPSIS- c/o nausea and vomiting approx 12 episodes of vomiting and diarrhea, severe chills, shaking uncontrollably, fever 103.5 and low B/P 80's  2. UTI - 8/15  -CITROBACTER KOSERI (Abnormal)  3. ACP - AMD  (2 witnesses needed), DDNR  (not signed by a physician)     [address] orders at discharge: none  [facility] visit 8/20    Advance Care Planning:   Does patient have an Advance Directive:  ACP on file, and DDNR (not valid)    Medication(s):   New Medications at Discharge:   amoxicillin-clavulanate (AUGMENTIN) 875-125 mg per tablet Take 1 Tab by mouth every twelve (12) hours for 5 days.         hydrocortisone (ANUSOL-HC) 2.5 % rectal cream Insert  into rectum two (2) times a day.         aspirin delayed-release 81 mg tablet Take 1 Tab by mouth daily.  (start on 8/25/19)       furosemide (LASIX) 40 mg tablet Take 1 Tab by mouth daily.         potassium chloride SR (K-TAB) 20 mEq tablet Take 1 Tab by mouth daily. Referral to Pharm D needed: no     Current Outpatient Medications   Medication Sig    amoxicillin-clavulanate (AUGMENTIN) 875-125 mg per tablet Take 1 Tab by mouth every twelve (12) hours for 5 days.  [START ON 8/25/2019] aspirin delayed-release 81 mg tablet Take 1 Tab by mouth daily.  potassium chloride SR (K-TAB) 20 mEq tablet Take 1 Tab by mouth daily.  furosemide (LASIX) 40 mg tablet Take 1 Tab by mouth daily.  hydrocortisone (ANUSOL-HC) 2.5 % rectal cream Insert  into rectum two (2) times a day. No current facility-administered medications for this visit. [facility] follow up appointment(s):   Future Appointments   Date Time Provider Reji Richard   8/22/2019  1:00 PM Polly Mosquera PA-C [address] [place] [address]:  out of service area     Goals      ACP:      8/21 ACP - AMD  (2 witnesses needed), DDNR  (not signed by a physician), paperwork invalid. CTN will inform pt. -[address]    8/23 Pt.f/u with PCP, [facility] on 8/22/19; Advance Care Planning (ACP) Provider Conversation;  PT WISHES TO BE FULL CODE!!!       Understands red flags post discharge. (sepsis)      8/21 NN  Will encourage pt.to monitor for fever > 101, or low body temp., fast heart rate >90, increased resp. rate, difficulty breathing, chills, shaking, fever and low B/P, change in mental status, to notify PCP office asap, or go to Urgent Care/ED for eval. NN contact information will be provided. NN will follow up with pt.in 1 week. -[address]    8/22 Pt.seen by PCP Justin Matthews on 8/22 (note from chart): Pt reports he has felt fine since d/c on Tues. No recurring n/v/d or fevers, edema 1+ bilateral LEs, pt notes progressively improving since d/c). CTN will attempt contact in 1 week. -[address]

## 2019-09-17 ENCOUNTER — PATIENT OUTREACH (OUTPATIENT)
Dept: INTERNAL MEDICINE CLINIC | Age: 74
End: 2019-09-17

## 2019-09-17 NOTE — PROGRESS NOTES
Goals      ACP:      8/21 ACP - AMD  (2 witnesses needed), DDNR  (not signed by a physician), paperwork invalid. CTN will inform pt. -[address]    8/23 Pt.f/u with PCP, [facility] on 8/22/19; Advance Care Planning (ACP) Provider Conversation;  PT WISHES TO BE FULL CODE!!!       Understands red flags post discharge. (sepsis)      8/21 NN  Will encourage pt.to monitor for fever > 101, or low body temp., fast heart rate >90, increased resp. rate, difficulty breathing, chills, shaking, fever and low B/P, change in mental status, to notify PCP office asap, or go to Urgent Care/ED for eval. NN contact information will be provided. NN will follow up with pt.in 1 week. -[address]    8/22 Pt.seen by PCP Saba Castro on 8/22 (note from chart): Pt reports he has felt fine since d/c on Tues. No recurring n/v/d or fevers, edema 1+ bilateral LEs, pt notes progressively improving since d/c). CTN will attempt contact in 1 week. -[address]    9/17 CTN attempt to contact pt., unable to reach, pt.without any future appts. CTN will continue to follow. CTN will f/u in 2-3 weeks. -[address]

## 2019-10-01 ENCOUNTER — OFFICE VISIT (OUTPATIENT)
Dept: CARDIOLOGY CLINIC | Age: 74
End: 2019-10-01

## 2019-10-01 VITALS
BODY MASS INDEX: 31.56 KG/M2 | HEIGHT: 72 IN | WEIGHT: 233 LBS | RESPIRATION RATE: 14 BRPM | SYSTOLIC BLOOD PRESSURE: 104 MMHG | OXYGEN SATURATION: 98 % | DIASTOLIC BLOOD PRESSURE: 80 MMHG | HEART RATE: 52 BPM

## 2019-10-01 DIAGNOSIS — I48.0 PAF (PAROXYSMAL ATRIAL FIBRILLATION) (HCC): Primary | ICD-10-CM

## 2019-10-01 DIAGNOSIS — R00.1 BRADYCARDIA: ICD-10-CM

## 2019-10-01 DIAGNOSIS — R65.21 SEPTIC SHOCK (HCC): ICD-10-CM

## 2019-10-01 DIAGNOSIS — R94.31 EKG ABNORMALITY: ICD-10-CM

## 2019-10-01 DIAGNOSIS — A41.9 SEPTIC SHOCK (HCC): ICD-10-CM

## 2019-10-01 DIAGNOSIS — E78.5 DYSLIPIDEMIA: ICD-10-CM

## 2019-10-01 NOTE — PROGRESS NOTES
Verified patient with two patient identifiers. Medications reviewed/approved by Dr. Fior Moreno. A verbal order from Dr. Fior Moreno was received with [facility] to remove any medications that were deleted during the visit. Medication(s) removed:  None    Chief Complaint   Patient presents with    Irregular Heart Beat     During August hospitalization. 1. Have you been to the ER, urgent care clinic since your last visit? Hospitalized since your last visit? yes, [facility] 8/2019 for sepsis(uti)    2. Have you seen or consulted any other health care providers outside of the [address] [place] [address] since your last visit? Include any pap smears or colon screening.  no

## 2019-10-01 NOTE — PROGRESS NOTES
Gordy Fisher is a 75 y.o. male is here for routine f/u. Hospitalized at [facility] in 8/19 with UTI/sepsis/septic shock, PAF/RVR to NSR (amiodarone)--seen in consult by Cardiology. Echo 8/17/19 with LVEF 55-60, structurally normal heart. No current CV sx or complaints. Is on ASA only, not anticoagulated (CHADs2-VASC 1, age only). The patient denies chest pain/ shortness of breath, orthopnea, PND, LE edema, palpitations, syncope, presyncope or fatigue.        Patient Active Problem List    Diagnosis Date Noted    UTI (urinary tract infection) 08/15/2019    CASSIE (acute kidney injury) ([facility] [address] 08/15/2019    Septic shock ([facility] [address] 08/15/2019    Atrial arrhythmia 10/12/2017    Precordial pain 09/27/2017    Dyslipidemia     Bradycardia     Advance directive discussed with patient 09/26/2016      Zbigniew Yung PA-C  Past Medical History:   Diagnosis Date    Bradycardia     Dyslipidemia     Elevated PSA     Skin lesion of face     UTI (urinary tract infection)       Past Surgical History:   Procedure Laterality Date    COLONOSCOPY N/A 8/19/2019    COLONOSCOPY performed by Ramya Hinkle MD at [facility] ENDOSCOPY    COLONOSCOPY,DIAGNOSTIC  8/19/2019          No Known Allergies   Family History   Problem Relation Age of Onset    No Known Problems Mother     Alcohol abuse Father     Coronary Artery Disease Neg Hx       Social History     Socioeconomic History    Marital status:      Spouse name: Not on file    Number of children: Not on file    Years of education: Not on file    Highest education level: Not on file   Occupational History    Not on file   Social Needs    Financial resource strain: Not on file    Food insecurity:     Worry: Not on file     Inability: Not on file    Transportation needs:     Medical: Not on file     Non-medical: Not on file   Tobacco Use    Smoking status: Former Smoker     Packs/day: 1.00     Years: 5.00     Pack years: 5.00     Types: Cigarettes     Last attempt to quit: 1967     Years since quittin.0    Smokeless tobacco: Never Used   Substance and Sexual Activity    Alcohol use: Yes     Alcohol/week: 7.0 standard drinks     Types: 7 Glasses of wine per week     Comment: 16 oz tonight    Drug use: No    Sexual activity: Yes   Lifestyle    Physical activity:     Days per week: Not on file     Minutes per session: Not on file    Stress: Not on file   Relationships    Social connections:     Talks on phone: Not on file     Gets together: Not on file     Attends Caodaism service: Not on file     Active member of club or organization: Not on file     Attends meetings of clubs or organizations: Not on file     Relationship status: Not on file    Intimate partner violence:     Fear of current or ex partner: Not on file     Emotionally abused: Not on file     Physically abused: Not on file     Forced sexual activity: Not on file   Other Topics Concern    Not on file   Social History Narrative    Not on file      Current Outpatient Medications   Medication Sig    aspirin delayed-release 81 mg tablet Take 1 Tab by mouth daily.  potassium chloride SR (K-TAB) 20 mEq tablet Take 1 Tab by mouth daily.  furosemide (LASIX) 40 mg tablet Take 1 Tab by mouth daily.  hydrocortisone (ANUSOL-HC) 2.5 % rectal cream Insert  into rectum two (2) times a day. No current facility-administered medications for this visit. Review of Symptoms:    CONST  No weight change. No fever, chills, sweats    ENT No visual changes, URI sx, sore throat    CV  See HPI   RESP  No cough, or sputum, wheezing. Also see HPI   GI  No abdominal pain or change in bowel habits. No heartburn or dysphagia. No melena or rectal bleeding.   No dysuria, urgency, frequency, hematuria   MSKEL  No joint pain, swelling. No muscle pain. SKIN  No rash or lesions. NEURO  No headache, syncope, or seizure. No weakness, loss of sensation, or paresthesias.     PSYCH  No low mood or depression  No anxiety. HE/LYMPH  No easy bruising, abnormal bleeding, or enlarged glands. Physical ExamPhysical Exam:    There were no vitals taken for this visit.   Gen: NAD  HEENT:  PERRL, throat clear  Neck: no adenopathy, no thyromegaly, no JVD   Heart:  Regular,Nl S1S2,  no murmur, gallop or rub.   Lungs:  clear  Abdomen:   Soft, non-tender, bowel sounds are active.   Extremities:  No edema  Pulse: symmetric  Neuro: A&O times 3, No focal neuro deficits    Cardiographics    ECG: sinus fab,PAC      Labs:   Lab Results   Component Value Date/Time    Sodium 141 08/26/2019 10:33 AM    Sodium 140 08/20/2019 02:39 AM    Sodium 142 08/19/2019 03:55 AM    Sodium 141 08/18/2019 03:23 AM    Sodium 140 08/17/2019 04:18 AM    Potassium 4.6 08/26/2019 10:33 AM    Potassium 3.6 08/20/2019 02:39 AM    Potassium 3.7 08/19/2019 03:55 AM    Potassium 3.9 08/18/2019 03:23 AM    Potassium 3.8 08/17/2019 04:18 AM    Chloride 102 08/26/2019 10:33 AM    Chloride 107 08/20/2019 02:39 AM    Chloride 110 (H) 08/19/2019 03:55 AM    Chloride 111 (H) 08/18/2019 03:23 AM    Chloride 110 (H) 08/17/2019 04:18 AM    CO2 25 08/26/2019 10:33 AM    CO2 27 08/20/2019 02:39 AM    CO2 26 08/19/2019 03:55 AM    CO2 26 08/18/2019 03:23 AM    CO2 24 08/17/2019 04:18 AM    Anion gap 6 08/20/2019 02:39 AM    Anion gap 6 08/19/2019 03:55 AM    Anion gap 4 (L) 08/18/2019 03:23 AM    Anion gap 6 08/17/2019 04:18 AM    Anion gap 6 08/16/2019 04:30 AM    Glucose 97 08/26/2019 10:33 AM    Glucose 83 08/20/2019 02:39 AM    Glucose 82 08/19/2019 03:55 AM    Glucose 89 08/18/2019 03:23 AM    Glucose 99 08/17/2019 04:18 AM    BUN 14 08/26/2019 10:33 AM    BUN 8 08/20/2019 02:39 AM    BUN 7 08/19/2019 03:55 AM    BUN 8 08/18/2019 03:23 AM    BUN 9 08/17/2019 04:18 AM    Creatinine 0.86 08/26/2019 10:33 AM    Creatinine 0.92 08/20/2019 02:39 AM    Creatinine 0.85 08/19/2019 03:55 AM    Creatinine 0.89 08/18/2019 03:23 AM    Creatinine 0.94 08/17/2019 04:18 AM    BUN/Creatinine ratio 16 08/26/2019 10:33 AM    BUN/Creatinine ratio 9 (L) 08/20/2019 02:39 AM    BUN/Creatinine ratio 8 (L) 08/19/2019 03:55 AM    BUN/Creatinine ratio 9 (L) 08/18/2019 03:23 AM    BUN/Creatinine ratio 10 (L) 08/17/2019 04:18 AM    GFR est AA 99 08/26/2019 10:33 AM    GFR est AA >60 08/20/2019 02:39 AM    GFR est AA >60 08/19/2019 03:55 AM    GFR est AA >60 08/18/2019 03:23 AM    GFR est AA >60 08/17/2019 04:18 AM    GFR est non-AA 85 08/26/2019 10:33 AM    GFR est non-AA >60 08/20/2019 02:39 AM    GFR est non-AA >60 08/19/2019 03:55 AM    GFR est non-AA >60 08/18/2019 03:23 AM    GFR est non-AA >60 08/17/2019 04:18 AM    Calcium 9.5 08/26/2019 10:33 AM    Calcium 8.2 (L) 08/20/2019 02:39 AM    Calcium 8.2 (L) 08/19/2019 03:55 AM    Calcium 8.1 (L) 08/18/2019 03:23 AM    Calcium 7.7 (L) 08/17/2019 04:18 AM    Bilirubin, total 0.6 08/17/2019 04:18 AM    Bilirubin, total 0.9 08/16/2019 04:30 AM    Bilirubin, total 0.6 08/15/2019 11:38 AM    Bilirubin, total 0.4 08/15/2019 03:25 AM    Bilirubin, total 0.6 09/18/2017 12:06 PM    AST (SGOT) 30 08/17/2019 04:18 AM    AST (SGOT) 31 08/16/2019 04:30 AM    AST (SGOT) 34 08/15/2019 11:38 AM    AST (SGOT) 42 (H) 08/15/2019 03:25 AM    AST (SGOT) 20 09/18/2017 12:06 PM    Alk. phosphatase 62 08/17/2019 04:18 AM    Alk. phosphatase 64 08/16/2019 04:30 AM    Alk. phosphatase 54 08/15/2019 11:38 AM    Alk. phosphatase 78 08/15/2019 03:25 AM    Alk.  phosphatase 77 09/18/2017 12:06 PM    Protein, total 5.6 (L) 08/17/2019 04:18 AM    Protein, total 6.0 (L) 08/16/2019 04:30 AM    Protein, total 6.1 (L) 08/15/2019 11:38 AM    Protein, total 5.7 (L) 08/15/2019 03:25 AM    Protein, total 7.5 09/18/2017 12:06 PM    Albumin 2.7 (L) 08/17/2019 04:18 AM    Albumin 2.9 (L) 08/16/2019 04:30 AM    Albumin 3.2 (L) 08/15/2019 11:38 AM    Albumin 3.1 (L) 08/15/2019 03:25 AM    Albumin 4.6 09/18/2017 12:06 PM    Globulin 2.9 08/17/2019 04:18 AM    Globulin 3.1 08/16/2019 04:30 AM    Globulin 2.9 08/15/2019 11:38 AM    Globulin 2.6 08/15/2019 03:25 AM    A-G Ratio 0.9 (L) 08/17/2019 04:18 AM    A-G Ratio 0.9 (L) 08/16/2019 04:30 AM    A-G Ratio 1.1 08/15/2019 11:38 AM    A-G Ratio 1.2 08/15/2019 03:25 AM    A-G Ratio 1.6 09/18/2017 12:06 PM    ALT (SGPT) 28 08/17/2019 04:18 AM    ALT (SGPT) 31 08/16/2019 04:30 AM    ALT (SGPT) 29 08/15/2019 11:38 AM    ALT (SGPT) 32 08/15/2019 03:25 AM    ALT (SGPT) 16 09/18/2017 12:06 PM     Lab Results   Component Value Date/Time    CK 98 08/15/2019 03:25 AM     Lab Results   Component Value Date/Time    Cholesterol, total 303 (H) 09/18/2017 12:06 PM    Cholesterol, total 245 (H) 09/23/2016 11:04 AM    HDL Cholesterol 36 (L) 09/18/2017 12:06 PM    HDL Cholesterol 41 09/23/2016 11:04 AM    LDL, calculated 221 (H) 09/18/2017 12:06 PM    LDL, calculated 185 (H) 09/23/2016 11:04 AM    Triglyceride 228 (H) 09/18/2017 12:06 PM    Triglyceride 97 09/23/2016 11:04 AM     No results found for this or any previous visit. Assessment:         Patient Active Problem List    Diagnosis Date Noted    UTI (urinary tract infection) 08/15/2019    CASSIE (acute kidney injury) ([facility] [address] 08/15/2019    Septic shock ([facility] [address] 08/15/2019    Atrial arrhythmia 10/12/2017    Precordial pain 09/27/2017    Dyslipidemia     Bradycardia     Advance directive discussed with patient 09/26/2016      Hospitalized at [address] [place] [address] in 8/15-8/20/2019 with UTI/sepsis/septic shock, PAF/RVR to NSR (amiodarone)--seen in consult by Cardiology. Echo 8/17/19 with LVEF 55-60, structurally normal heart. Had rectal bleeding, anemia--colonoscopy with int hemorrhoids. No current CV sx or complaints. Is on ASA only, not anticoagulated (CHADs2-VASC 1, age only). No hx DM, hypertension, CAD (stress test 2017 with Javon 12:00, normal). Prior Holter 2017 with PAC's, occ PVC's, short PAT only. Plan:     Doing well with no adverse cardiac symptoms.    OK to stop ASA at this point  Lipids and labs followed by PCP. Continue current care and f/u in 12 months.     Sundar Amaya MD

## 2020-02-27 ENCOUNTER — HOSPITAL ENCOUNTER (OUTPATIENT)
Dept: PREADMISSION TESTING | Age: 75
Discharge: HOME | End: 2020-02-27
Payer: MEDICARE

## 2020-02-27 LAB
ANION GAP SERPL CALC-SCNC: 5 MMOL/L (ref 3–18)
ATRIAL RATE: 52 BPM
BUN SERPL-MCNC: 15 MG/DL (ref 7–18)
BUN/CREAT SERPL: 16 (ref 12–20)
CALCIUM SERPL-MCNC: 8.6 MG/DL (ref 8.5–10.1)
CALCULATED P AXIS, ECG09: 45 DEGREES
CALCULATED R AXIS, ECG10: 52 DEGREES
CALCULATED T AXIS, ECG11: 70 DEGREES
CHLORIDE SERPL-SCNC: 106 MMOL/L (ref 100–111)
CO2 SERPL-SCNC: 30 MMOL/L (ref 21–32)
CREAT SERPL-MCNC: 0.93 MG/DL (ref 0.6–1.3)
DIAGNOSIS, 93000: NORMAL
GLUCOSE SERPL-MCNC: 95 MG/DL (ref 74–99)
HCT VFR BLD AUTO: 42.8 % (ref 36–48)
HGB BLD-MCNC: 14.4 G/DL (ref 13–16)
P-R INTERVAL, ECG05: 172 MS
POTASSIUM SERPL-SCNC: 4 MMOL/L (ref 3.5–5.5)
Q-T INTERVAL, ECG07: 414 MS
QRS DURATION, ECG06: 108 MS
QTC CALCULATION (BEZET), ECG08: 385 MS
SODIUM SERPL-SCNC: 141 MMOL/L (ref 136–145)
VENTRICULAR RATE, ECG03: 52 BPM

## 2020-02-27 PROCEDURE — 36415 COLL VENOUS BLD VENIPUNCTURE: CPT

## 2020-02-27 PROCEDURE — 80048 BASIC METABOLIC PNL TOTAL CA: CPT

## 2020-02-27 PROCEDURE — 93005 ELECTROCARDIOGRAM TRACING: CPT

## 2020-02-27 PROCEDURE — 85018 HEMOGLOBIN: CPT

## 2020-03-11 ENCOUNTER — ANESTHESIA EVENT (OUTPATIENT)
Dept: SURGERY | Age: 75
End: 2020-03-11
Payer: MEDICARE

## 2020-03-11 NOTE — H&P
Urology [address] [place] [address] Britta Romero  Tel: [phone]  Fax: [phone]      Patient: Yash Topete  YOB: 1945  Date: 03/11/2020 6:47 PM   Visit Type: Pre Op        Assessment/Plan  # Detail Type Description    1. Assessment Enlarged prostate with lower urinary tract symptoms (N40.1). Patient Plan Patient underwent a transrectal ultrasound, cystoscopy and a uroflow study. His findings revealed evidence of obstruction he had enlarged lateral lobes but no median lobe. His uroflow study showed a peak flow of 7 mL/second with an obstructed pattern. We reviewed treatment options including UroLift GreenLight laser prostate as well as rezum at this time he has elected to undergo resume therapy. We will schedule this for him all risks including pain infection bleeding need for a catheter irritative symptoms postoperatively and possible need for repeat procedure in the future were discussed he understands and agrees         2. Assessment Feeling of incomplete bladder emptying (R39.14). Patient Plan          3. Assessment Frequency of micturition (R35.0). Patient Plan          4. Assessment Elevated prostate specific antigen [PSA] (R97.20). Patient Plan                    This 75year old male presents for bph and Elevated PSA Uro. History of Present Illness:  1.  bph   Onset was gradual. Severity level is 6. It occurs intermittently. The problem is worse. Reviewed today was a PSA taken on 07/04/2015 with findings of 5.58 ng/mL. There were no identified risk factors. Associated symptoms include dysuria, nocturia (4 times per night) and urinary frequency (every 1 hour). Pertinent negatives include chills, constipation, fever, hematuria, incomplete emptying, intermittent stream, pelvic pain, pelvic pressure, pressure, slow stream, splitting stream, suprapubic pain, urgency, urinary incontinence, dribbling and urinary straining.  Additional information: Pt has been complaining of frequency and nocturia. he was treated with abx keflex for UtI 12/27 for C. koseri. He currently says he has dysuria again. Deneis any fever or chills. Pt is doing better with decreaesd nocturia 1-2 nights. He has stopped rapaflo and drinks coffee but DTF every 2 hrs. His repeat PSA has increased to 5.58 see above but pt  initially had a PSA of 8. PHI 8/6/15 score 33.8, 18.1% risk of prostate ca if had prostate bx. .      11/4/19  Pt last seen in 2015, BPH. Since last visit he has not had PSA. He was septic in August from UTI, he has slow stream, urge, frequency, nocturia x 2. He was on Flomax but stopped. He will need to restart Flomax 0.4mg.  1/13/20  Pt still with urinary difficulties. He did not refill Flomax he thinks it helped. I will schedule him for a cystoscopy uroflow and TRUS US. He will restart Flomax. 01/31/2020  Patient is here today to undergo a complete BPH workup including a transrectal ultrasound of the prostate measured size and volume, a cystoscopy and a uroflow study. 2.  Elevated PSA Uro   The onset of symptoms was sudden. Pertinent history includes age over 48 and prior UTIs. The patient does not report modifying factors such as medication, instrumentation, recent ejaculation or recent 5 SRIRAM. Associated symptoms include dysuria, nocturia 2 times per night, recurrent UTIs, slow stream, straining to urinate, urinary urgency and urinary frequency. Pertinent negatives include hematuria, incomplete emptying, urinary incontinence, intermittent urinary stream, pelvic pain, pressure, splitting stream, suprapubic pain and urinary dribbling. Additional information: Additional information: Pt with hx of elevated PSA he needs repeat prior to next visit. Ann Lopez Pt with hx of elevated PSA he needs repeat prior to next visit. .                Medications (active prior to today)  Medication Name Sig Description Start Date Stop Date Refilled Rx Elsewhere   Flomax 0.4 mg capsule take 1-2 capsule by oral route  at bedtime. 01/13/2020 01/13/2020 N       Allergies  Ingredient Reaction (Severity) Medication Name Comment   NO KNOWN ALLERGIES                Review of Systems  System Neg/Pos Details   Constitutional Negative Chills and Fever. GI Negative Constipation.  Positive Dysuria, Nocturia, Recurrent UTI, Slow stream, Urgency, Urinary frequency, Urinary straining.  Negative Hematuria, Incomplete emptying, Intermittent urinary stream, Pelvic pain, Pelvic pressure, Pressure, Splitting stream, Suprapubic pain, Urinary dribbling and Urinary incontinence. Physical Exam  Exam Findings Details   Constitutional Normal Well developed. Neck Exam Normal Inspection - Normal.   Respiratory Normal Inspection - Normal.   Extremity Normal No edema. Neurological Normal Alert and oriented to person, place and time. Cranial nerves intact. No motor or sensory deficits. Psychiatric Normal Orientation - Oriented to time, place, person & situation. Appropriate mood and affect. Medications (added, continued, or stopped today)  Start Date Medication Directions PRN Status PRN Reason Instruction Stop Date   01/13/2020 Flomax 0.4 mg capsule take 1-2 capsule by oral route  at bedtime. N      Active Patient Care Team Members    Name Contact Agency Type Support Role Relationship Active Date Inactive Date Specialty   Ifeanyichukwu Aylai   encounter provider    Urology   Lesley Bland   Emergency Contact Spouse      Tiara Jaimes   Patient provider PCP      Tiara Jaimes   primary care provider           Provider: Pilo Cohen MD 03/11/2020 06:47 PM  Document generated by:  Anita Hutchins 03/11/2020 06:59 PM

## 2020-03-12 ENCOUNTER — HOSPITAL ENCOUNTER (OUTPATIENT)
Age: 75
Setting detail: OUTPATIENT SURGERY
Discharge: HOME | End: 2020-03-12
Attending: UROLOGY | Admitting: UROLOGY
Payer: MEDICARE

## 2020-03-12 ENCOUNTER — ANESTHESIA (OUTPATIENT)
Dept: SURGERY | Age: 75
End: 2020-03-12
Payer: MEDICARE

## 2020-03-12 VITALS
WEIGHT: 234.38 LBS | DIASTOLIC BLOOD PRESSURE: 67 MMHG | BODY MASS INDEX: 31.74 KG/M2 | HEART RATE: 65 BPM | SYSTOLIC BLOOD PRESSURE: 115 MMHG | OXYGEN SATURATION: 100 % | HEIGHT: 72 IN | TEMPERATURE: 97.8 F | RESPIRATION RATE: 15 BRPM

## 2020-03-12 PROCEDURE — 77030020782 HC GWN BAIR PAWS FLX 3M -B: Performed by: UROLOGY

## 2020-03-12 PROCEDURE — 77030018836 HC SOL IRR NACL ICUM -A: Performed by: UROLOGY

## 2020-03-12 PROCEDURE — 74011250636 HC RX REV CODE- 250/636: Performed by: NURSE ANESTHETIST, CERTIFIED REGISTERED

## 2020-03-12 PROCEDURE — 74011000250 HC RX REV CODE- 250: Performed by: NURSE ANESTHETIST, CERTIFIED REGISTERED

## 2020-03-12 PROCEDURE — 76060000032 HC ANESTHESIA 0.5 TO 1 HR: Performed by: UROLOGY

## 2020-03-12 PROCEDURE — 74011250636 HC RX REV CODE- 250/636: Performed by: UROLOGY

## 2020-03-12 PROCEDURE — 76210000063 HC OR PH I REC FIRST 0.5 HR: Performed by: UROLOGY

## 2020-03-12 PROCEDURE — 77030010539 HC BG LEG URIN BARD -A: Performed by: UROLOGY

## 2020-03-12 PROCEDURE — 76210000021 HC REC RM PH II 0.5 TO 1 HR: Performed by: UROLOGY

## 2020-03-12 PROCEDURE — 76010000138 HC OR TIME 0.5 TO 1 HR: Performed by: UROLOGY

## 2020-03-12 PROCEDURE — 74011250637 HC RX REV CODE- 250/637: Performed by: UROLOGY

## 2020-03-12 RX ORDER — EPHEDRINE SULFATE/0.9% NACL/PF 50 MG/5 ML
SYRINGE (ML) INTRAVENOUS AS NEEDED
Status: DISCONTINUED | OUTPATIENT
Start: 2020-03-12 | End: 2020-03-12 | Stop reason: HOSPADM

## 2020-03-12 RX ORDER — ONDANSETRON 2 MG/ML
INJECTION INTRAMUSCULAR; INTRAVENOUS AS NEEDED
Status: DISCONTINUED | OUTPATIENT
Start: 2020-03-12 | End: 2020-03-12 | Stop reason: HOSPADM

## 2020-03-12 RX ORDER — FENTANYL CITRATE 50 UG/ML
50 INJECTION, SOLUTION INTRAMUSCULAR; INTRAVENOUS
Status: DISCONTINUED | OUTPATIENT
Start: 2020-03-12 | End: 2020-03-12 | Stop reason: HOSPADM

## 2020-03-12 RX ORDER — FUROSEMIDE 10 MG/ML
INJECTION INTRAMUSCULAR; INTRAVENOUS AS NEEDED
Status: DISCONTINUED | OUTPATIENT
Start: 2020-03-12 | End: 2020-03-12 | Stop reason: HOSPADM

## 2020-03-12 RX ORDER — PHENYLEPHRINE HCL IN 0.9% NACL 1 MG/10 ML
SYRINGE (ML) INTRAVENOUS AS NEEDED
Status: DISCONTINUED | OUTPATIENT
Start: 2020-03-12 | End: 2020-03-12 | Stop reason: HOSPADM

## 2020-03-12 RX ORDER — GLYCOPYRROLATE 0.2 MG/ML
INJECTION INTRAMUSCULAR; INTRAVENOUS AS NEEDED
Status: DISCONTINUED | OUTPATIENT
Start: 2020-03-12 | End: 2020-03-12 | Stop reason: HOSPADM

## 2020-03-12 RX ORDER — ONDANSETRON 2 MG/ML
4 INJECTION INTRAMUSCULAR; INTRAVENOUS ONCE
Status: DISCONTINUED | OUTPATIENT
Start: 2020-03-12 | End: 2020-03-12 | Stop reason: HOSPADM

## 2020-03-12 RX ORDER — SODIUM CHLORIDE, SODIUM LACTATE, POTASSIUM CHLORIDE, CALCIUM CHLORIDE 600; 310; 30; 20 MG/100ML; MG/100ML; MG/100ML; MG/100ML
125 INJECTION, SOLUTION INTRAVENOUS CONTINUOUS
Status: DISCONTINUED | OUTPATIENT
Start: 2020-03-12 | End: 2020-03-12 | Stop reason: HOSPADM

## 2020-03-12 RX ORDER — LEVOFLOXACIN 5 MG/ML
500 INJECTION, SOLUTION INTRAVENOUS ONCE
Status: COMPLETED | OUTPATIENT
Start: 2020-03-12 | End: 2020-03-12

## 2020-03-12 RX ORDER — PROPOFOL 10 MG/ML
INJECTION, EMULSION INTRAVENOUS AS NEEDED
Status: DISCONTINUED | OUTPATIENT
Start: 2020-03-12 | End: 2020-03-12 | Stop reason: HOSPADM

## 2020-03-12 RX ORDER — LIDOCAINE HYDROCHLORIDE 20 MG/ML
INJECTION, SOLUTION EPIDURAL; INFILTRATION; INTRACAUDAL; PERINEURAL AS NEEDED
Status: DISCONTINUED | OUTPATIENT
Start: 2020-03-12 | End: 2020-03-12 | Stop reason: HOSPADM

## 2020-03-12 RX ORDER — OXYCODONE AND ACETAMINOPHEN 5; 325 MG/1; MG/1
1 TABLET ORAL
Status: DISCONTINUED | OUTPATIENT
Start: 2020-03-12 | End: 2020-03-12 | Stop reason: HOSPADM

## 2020-03-12 RX ORDER — NALOXONE HYDROCHLORIDE 0.4 MG/ML
0.4 INJECTION, SOLUTION INTRAMUSCULAR; INTRAVENOUS; SUBCUTANEOUS AS NEEDED
Status: DISCONTINUED | OUTPATIENT
Start: 2020-03-12 | End: 2020-03-12 | Stop reason: HOSPADM

## 2020-03-12 RX ORDER — ATROPA BELLADONNA AND OPIUM 16.2; 3 MG/1; MG/1
SUPPOSITORY RECTAL AS NEEDED
Status: DISCONTINUED | OUTPATIENT
Start: 2020-03-12 | End: 2020-03-12 | Stop reason: HOSPADM

## 2020-03-12 RX ORDER — FENTANYL CITRATE 50 UG/ML
INJECTION, SOLUTION INTRAMUSCULAR; INTRAVENOUS AS NEEDED
Status: DISCONTINUED | OUTPATIENT
Start: 2020-03-12 | End: 2020-03-12 | Stop reason: HOSPADM

## 2020-03-12 RX ORDER — MIDAZOLAM HYDROCHLORIDE 1 MG/ML
INJECTION, SOLUTION INTRAMUSCULAR; INTRAVENOUS AS NEEDED
Status: DISCONTINUED | OUTPATIENT
Start: 2020-03-12 | End: 2020-03-12 | Stop reason: HOSPADM

## 2020-03-12 RX ORDER — SODIUM CHLORIDE, SODIUM LACTATE, POTASSIUM CHLORIDE, CALCIUM CHLORIDE 600; 310; 30; 20 MG/100ML; MG/100ML; MG/100ML; MG/100ML
100 INJECTION, SOLUTION INTRAVENOUS CONTINUOUS
Status: DISCONTINUED | OUTPATIENT
Start: 2020-03-12 | End: 2020-03-12 | Stop reason: HOSPADM

## 2020-03-12 RX ORDER — DEXAMETHASONE SODIUM PHOSPHATE 4 MG/ML
INJECTION, SOLUTION INTRA-ARTICULAR; INTRALESIONAL; INTRAMUSCULAR; INTRAVENOUS; SOFT TISSUE AS NEEDED
Status: DISCONTINUED | OUTPATIENT
Start: 2020-03-12 | End: 2020-03-12 | Stop reason: HOSPADM

## 2020-03-12 RX ORDER — FLUMAZENIL 0.1 MG/ML
0.2 INJECTION INTRAVENOUS
Status: DISCONTINUED | OUTPATIENT
Start: 2020-03-12 | End: 2020-03-12 | Stop reason: HOSPADM

## 2020-03-12 RX ADMIN — LIDOCAINE HYDROCHLORIDE 40 MG: 20 INJECTION, SOLUTION EPIDURAL; INFILTRATION; INTRACAUDAL; PERINEURAL at 14:42

## 2020-03-12 RX ADMIN — Medication 10 MG: at 15:04

## 2020-03-12 RX ADMIN — ONDANSETRON HYDROCHLORIDE 4 MG: 2 INJECTION INTRAMUSCULAR; INTRAVENOUS at 14:53

## 2020-03-12 RX ADMIN — Medication 100 MCG: at 15:27

## 2020-03-12 RX ADMIN — SODIUM CHLORIDE, SODIUM LACTATE, POTASSIUM CHLORIDE, AND CALCIUM CHLORIDE 125 ML/HR: 600; 310; 30; 20 INJECTION, SOLUTION INTRAVENOUS at 12:55

## 2020-03-12 RX ADMIN — LEVOFLOXACIN 500 MG: 5 INJECTION, SOLUTION INTRAVENOUS at 14:46

## 2020-03-12 RX ADMIN — Medication 100 MCG: at 15:19

## 2020-03-12 RX ADMIN — Medication 100 MCG: at 15:09

## 2020-03-12 RX ADMIN — FUROSEMIDE 10 MG: 10 INJECTION, SOLUTION INTRAVENOUS at 14:56

## 2020-03-12 RX ADMIN — SODIUM CHLORIDE, SODIUM LACTATE, POTASSIUM CHLORIDE, AND CALCIUM CHLORIDE: 600; 310; 30; 20 INJECTION, SOLUTION INTRAVENOUS at 15:08

## 2020-03-12 RX ADMIN — Medication 100 MCG: at 15:16

## 2020-03-12 RX ADMIN — DEXAMETHASONE SODIUM PHOSPHATE 4 MG: 4 INJECTION, SOLUTION INTRAMUSCULAR; INTRAVENOUS at 14:50

## 2020-03-12 RX ADMIN — GLYCOPYRROLATE 0.2 MG: 0.2 INJECTION INTRAMUSCULAR; INTRAVENOUS at 14:50

## 2020-03-12 RX ADMIN — Medication 10 MG: at 14:54

## 2020-03-12 RX ADMIN — Medication 100 MCG: at 15:07

## 2020-03-12 RX ADMIN — FENTANYL CITRATE 50 MCG: 50 INJECTION, SOLUTION INTRAMUSCULAR; INTRAVENOUS at 14:46

## 2020-03-12 RX ADMIN — MIDAZOLAM 2 MG: 1 INJECTION INTRAMUSCULAR; INTRAVENOUS at 14:37

## 2020-03-12 RX ADMIN — PROPOFOL 200 MG: 10 INJECTION, EMULSION INTRAVENOUS at 14:42

## 2020-03-12 RX ADMIN — FENTANYL CITRATE 25 MCG: 50 INJECTION, SOLUTION INTRAMUSCULAR; INTRAVENOUS at 15:14

## 2020-03-12 NOTE — BRIEF OP NOTE
BRIEF OPERATIVE NOTE    Date of Procedure: 3/12/2020   Preoperative Diagnosis: BENIGN PROSTATIS HYPERTROPHY W/OBSTRUCTION  Postoperative Diagnosis: BENIGN PROSTATIS HYPERTROPHY W/OBSTRUCTION    Procedure(s):  REZUM WATER VAPOR THERAPY 8 treatments  Surgeon(s) and Role:     Aron De Souza MD - Primary         Surgical Assistant: Amee Jennings    Surgical Staff:  Circ-1: Fayne Hamman, RN  Circ-2: Taylor Arboleda RN  Scrub Tech-1: Clearence Fulling  Surg Asst-1: Yg Al  Event Time In   Incision Start 1450   Incision Close 1527     Anesthesia: General   Estimated Blood Loss: minimal  Specimens: * No specimens in log *   Findings: very large prostatic lobes   Complications: none  Implants: * No implants in log *

## 2020-03-12 NOTE — ANESTHESIA PREPROCEDURE EVALUATION
Relevant Problems   No relevant active problems       Anesthetic History   No history of anesthetic complications            Review of Systems / Medical History  Patient summary reviewed, nursing notes reviewed and pertinent labs reviewed    Pulmonary              Pertinent negatives: No COPD, asthma, recent URI and sleep apnea  Comments: Former smoker   Neuro/Psych   Within defined limits           Cardiovascular  Within defined limits                Exercise tolerance: >4 METS  Comments: Occ.  Bradycardia - asymptomatic   GI/Hepatic/Renal  Within defined limits              Endo/Other        Obesity  Pertinent negatives: No diabetes, hypothyroidism, hyperthyroidism and blood dyscrasia   Other Findings              Physical Exam    Airway  Mallampati: III  TM Distance: 4 - 6 cm  Neck ROM: decreased range of motion   Mouth opening: Normal     Cardiovascular  Regular rate and rhythm,  S1 and S2 normal,  no murmur, click, rub, or gallop             Dental         Pulmonary  Breath sounds clear to auscultation               Abdominal  GI exam deferred       Other Findings            Anesthetic Plan    ASA: 2  Anesthesia type: general          Induction: Intravenous  Anesthetic plan and risks discussed with: Patient      GA/LMA

## 2020-03-12 NOTE — ANESTHESIA POSTPROCEDURE EVALUATION
Post-Anesthesia Evaluation & Assessment    Visit Vitals  /68   Pulse 76   Temp 36.6 °C (97.9 °F)   Resp 19   Ht 6' (1.829 m)   Wt 106.3 kg (234 lb 6 oz)   SpO2 96%   BMI 31.79 kg/m²       Nausea/Vomiting: Controlled. Post-operative hydration adequate. Pain Scale 1: Numeric (0 - 10) (03/12/20 1550)  Pain Intensity 1: 0 (03/12/20 1550)   Managed    Pain score at or below stated goal level. Mental status & Level of consciousness: alert and oriented x 3    Neurological status: moves all extremities, sensation grossly intact    Pulmonary status: airway patent, adequate oxygenation. Complications related to anesthesia: none    Patient has met all PACU discharge requirements.       Lilly Fabian DO

## 2020-03-12 NOTE — PERIOP NOTES
TRANSFER - IN REPORT:    Verbal report received from [address] [place], [place] [address]Luis 42 on [facility]  being received from OR (unit) for routine progression of care      Report consisted of patients Situation, Background, Assessment and   Recommendations(SBAR). Information from the following report(s) OR Summary, Procedure Summary, Intake/Output and MAR was reviewed with the receiving nurse. Opportunity for questions and clarification was provided. Assessment completed upon patients arrival to unit and care assumed.

## 2020-03-12 NOTE — OP NOTES
BRIEF OPERATIVE NOTE     Date of Procedure: 3/12/2020   Preoperative Diagnosis: BENIGN PROSTATIS HYPERTROPHY W/OBSTRUCTION  Postoperative Diagnosis: BENIGN PROSTATIS HYPERTROPHY W/OBSTRUCTION    Procedure(s):  REZUM WATER VAPOR THERAPY 8 treatments  Surgeon(s) and Role:     Robina Gardner MD - Primary         Surgical Assistant: Nasima Good     Surgical Staff:  Circ-1: Bib Bui RN  Circ-2: Kaitlin Del Valle RN  Scrub Tech-1: Dion Watkins  Surg Asst-1: Mary Palmer  Event Time In   Incision Start 1450   Incision Close 1527      Anesthesia: General   Estimated Blood Loss: minimal  Specimens: * No specimens in log *   Findings: very large prostatic lobes   Complications: none  Implants: * No implants in log *                    Procedure:     Patient was brought into the operating room and placed in the supine position, after the administration of general anesthesia he was placed in the lithotomy position. The groin and genitalia prepped and draped in the ureteral stone fashion. Began with a 21 Bengali cystoscope, cystourethroscopy was performed. The prostatic urethra was approximately 2 cm in length the patient had kissing lateral lobes both lateral lobes were very high, there was no elevation of the bladder neck or median lobe identified. Cystoscopy revealed normal ureteral orifices bilaterally, there were no masses or tumors throughout the bladder. At this point I removed the cystoscope under direct vision. I then placed the 30 degree cystoscope lens into the [employer] Hand-held device. Under direct vision I then entered into the patient's urethra and then through the prostatic urethra into the bladder 8 targeted treatments delivered into the prostate utilizing radiofrequency power to form sterile water vapor,  I began on the patient's left side at the 9 oclock position,   treating with Radio Frequency Water Vapor approximately 1 cm from the bladder neck.   I then treated again at 9:00 o'clock. on the left side approximately 9:00 o'clock. at the level of the verumontanum. Two additional treatments higher up in the prostate were performed on the left side  A total of 4 treatments were made on the patient's left lateral lobe. The same treatment was performed on the right side as well. At this point I placed a 22 [employer] Esme coude catheter into the patient's bladder,  Clear reflux of urine was identified. The patient was then extubated and transferred to recovery room in stable and satisfactory condition.

## 2020-03-12 NOTE — DISCHARGE INSTRUCTIONS
Rowan Sarkar. Romana Coyer, M.D. [facility] [address]  [address], [address] [place], [address]  Office: [phone]  Fax:    [phone]: Procedure(s):  [address] [facility] IMMEDIATELY if any of the following occur:     You are unable to urinate. Urgency to urinate is not uncommon.  You find yourself urinating small frequent amounts associated with severe lower abdominal discomfort.  Bright red blood with clots in the urine. Some reddish urine is not uncommon and should be treated with increasing the amount of fluids you drink.  Temperature above 101.5° and / or chills.  You are nauseous and / or vomiting and you cannot hold down any fluids.  Your pain is not controlled with the pain medication prescribed. Special Considerations:      Do not drive for at least 24 hours after the procedure and until you are no longer taking narcotic pain medication and you are able to move and react without hesitation. MEDICATIONS:  Pain   []  Norco®   []  Percocet® []  Dilaudid®    [x]  Tramadol   Antibiotics   []  Cipro   [x]  Keflex    [] Levaquin   []  Bactrim DS®       Urination   []  Vesicare®   []  Flomax     Burning   []  Pyridium®   []  UribelTM     Nausea   []  Zofran®   []  Phenergan®     Miscellaneous   []           [x] Prescriptions Written on Chart    [] Prescriptions sent Electronically           Our office will call you tomorrow to schedule your first follow-up appointment. Please contact [facility] at [phone] or go to the nearest Emergency Department / Urgent Care facility for any other medical questions or concerns.   Patient armband removed and shredded  DISCHARGE SUMMARY from Nurse    PATIENT INSTRUCTIONS:    After general anesthesia or intravenous sedation, for 24 hours or while taking prescription Narcotics:  · Limit your activities  · Do not drive and operate hazardous machinery  · Do not make important personal or business decisions  · Do  not drink alcoholic beverages  · If you have not urinated within 8 hours after discharge, please contact your surgeon on call. Report the following to your surgeon:  · Excessive pain, swelling, redness or odor of or around the surgical area  · Temperature over 100.5  · Nausea and vomiting lasting longer than 4 hours or if unable to take medications  · Any signs of decreased circulation or nerve impairment to extremity: change in color, persistent  numbness, tingling, coldness or increase pain  · Any questions    What to do at Home:  Recommended activity: Activity as tolerated, Activity as tolerated and no driving for today and Ambulate in house,     If you experience any of the following symptoms elevated temp, pain not relieved by medications prescribed for you, please follow up with Dr Romana Coyer. *  Please give a list of your current medications to your Primary Care Provider. *  Please update this list whenever your medications are discontinued, doses are      changed, or new medications (including over-the-counter products) are added. *  Please carry medication information at all times in case of emergency situations. These are general instructions for a healthy lifestyle:    No smoking/ No tobacco products/ Avoid exposure to second hand smoke  Surgeon General's Warning:  Quitting smoking now greatly reduces serious risk to your health. Obesity, smoking, and sedentary lifestyle greatly increases your risk for illness    A healthy diet, regular physical exercise & weight monitoring are important for maintaining a healthy lifestyle    You may be retaining fluid if you have a history of heart failure or if you experience any of the following symptoms:  Weight gain of 3 pounds or more overnight or 5 pounds in a week, increased swelling in our hands or feet or shortness of breath while lying flat in bed.   Please call your doctor as soon as you notice any of these symptoms; do not wait until your next office visit. The discharge information has been reviewed with the patient and spouse. The patient and spouse verbalized understanding. Discharge medications reviewed with the patient and spouse and appropriate educational materials and side effects teaching were provided.   ___________________________________________________________________________________________________________________________________

## 2020-03-12 NOTE — INTERVAL H&P NOTE
H&P Update:  Ro Mckinney was seen and examined. History and physical has been reviewed. The patient has been examined.  There have been no significant clinical changes since the completion of the originally dated History and Physical.

## 2020-03-12 NOTE — PERIOP NOTES
Reviewed PTA medication list with patient/caregiver and patient/caregiver denies any additional medications. Patient admits to having a responsible adult care for them at home for at least 24 hours after surgery. Patient encouraged to use gown warming system and informed that using said warming gown to regulate body temperature prior to a procedure has been shown to help reduce the risks of blood clots and infection. Dual skin assessment & fall risk band verification completed with Rony Sanz RN.

## 2020-08-07 ENCOUNTER — HOSPITAL ENCOUNTER (OUTPATIENT)
Dept: PREADMISSION TESTING | Age: 75
Discharge: HOME | End: 2020-08-07
Payer: MEDICARE

## 2020-08-07 LAB
ANION GAP SERPL CALC-SCNC: 3 MMOL/L (ref 3–18)
ATRIAL RATE: 52 BPM
BUN SERPL-MCNC: 10 MG/DL (ref 7–18)
BUN/CREAT SERPL: 11 (ref 12–20)
CALCIUM SERPL-MCNC: 8.7 MG/DL (ref 8.5–10.1)
CALCULATED P AXIS, ECG09: 42 DEGREES
CALCULATED R AXIS, ECG10: 50 DEGREES
CALCULATED T AXIS, ECG11: 65 DEGREES
CHLORIDE SERPL-SCNC: 108 MMOL/L (ref 100–111)
CO2 SERPL-SCNC: 30 MMOL/L (ref 21–32)
CREAT SERPL-MCNC: 0.94 MG/DL (ref 0.6–1.3)
DIAGNOSIS, 93000: NORMAL
GLUCOSE SERPL-MCNC: 74 MG/DL (ref 74–99)
HCT VFR BLD AUTO: 45.2 % (ref 36–48)
HGB BLD-MCNC: 15.1 G/DL (ref 13–16)
P-R INTERVAL, ECG05: 170 MS
POTASSIUM SERPL-SCNC: 4.3 MMOL/L (ref 3.5–5.5)
Q-T INTERVAL, ECG07: 436 MS
QRS DURATION, ECG06: 102 MS
QTC CALCULATION (BEZET), ECG08: 405 MS
SODIUM SERPL-SCNC: 141 MMOL/L (ref 136–145)
VENTRICULAR RATE, ECG03: 52 BPM

## 2020-08-07 PROCEDURE — 80048 BASIC METABOLIC PNL TOTAL CA: CPT

## 2020-08-07 PROCEDURE — 85018 HEMOGLOBIN: CPT

## 2020-08-07 PROCEDURE — 36415 COLL VENOUS BLD VENIPUNCTURE: CPT

## 2020-08-07 PROCEDURE — 93005 ELECTROCARDIOGRAM TRACING: CPT

## 2020-08-17 ENCOUNTER — HOSPITAL ENCOUNTER (OUTPATIENT)
Dept: PREADMISSION TESTING | Age: 75
Discharge: HOME | End: 2020-08-17
Attending: SURGERY
Payer: MEDICARE

## 2020-08-17 PROCEDURE — 87635 SARS-COV-2 COVID-19 AMP PRB: CPT

## 2020-08-18 LAB — SARS-COV-2, COV2NT: NOT DETECTED

## 2020-08-20 ENCOUNTER — ANESTHESIA EVENT (OUTPATIENT)
Dept: SURGERY | Age: 75
End: 2020-08-20
Payer: MEDICARE

## 2020-08-20 NOTE — H&P
Assessment/Plan  # Detail Type Description    1. Assessment Inguinal hernia (K40.90). Patient Plan  I have discussed the risks benefits and alternatives of the procedure to the patient including bleeding, infection, use of mesh, myofascial release, chronic post op pain, reason and side effects of nerve resections, recurrence and loss of testicle. They understand and wish to proceed. 2. 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 3. Assessment Body mass index (BMI) 31.0-31.9, adult (Z68.31). Plan Orders Today's instructions / counseling include(s) Dietary management education, guidance, and counseling. This 75year old male presents for Hernia. History of Present Illness:  1. Hernia   and RLQ  There is radiation to groin. The patient describes it as aching. Pertinent negatives include abdominal distention, anorexia, back pain, bloating, blood in stool, constipation, cough, diaphoresis, diarrhea, dizziness, dyspnea, epigastric pain, eructation, fatigue, fever, flank pain, flatulence, heartburn, hematuria, jaundice, lightheadedness, menstruation, milk/dairy intolerance, myalgia, nausea, post prandial fullness, reflux, scrotal swelling, vomiting, weight gain and weight loss. Additional information: 75year old male being seen today to discuss surgery. [facility]  and [place]. Rui Marcum PROBLEM LIST:   Problem List reviewed. PAST MEDICAL/SURGICAL HISTORY  (Detailed)    Disease/disorder Onset Date Management Date Comments     Moisés 8 treatments 2020    Prostatitis         Family History  (Detailed)  Relationship Family Member Name  Age at Death Condition Onset Age Cause of Death       Family history of Cancer, prostate  N   Family h/o    Cancer - prostate         Social History:  (Detailed)  Tobacco use reviewed. Preferred language is [place].     MARITAL STATUS/FAMILY/SOCIAL SUPPORT  Marital status:    Tobacco use status: Never smoked tobacco.  Smoking status: Never smoker. TOBACCO SCREENING:  Patient has never used tobacco. Patient has not used tobacco in the last 30 days. Patient has not used smokeless tobacco in the last 30 days. SMOKING STATUS  Type Smoking Status Usage Per Day Years Used Pack Years Total Pack Years    Never smoker         ALCOHOL  There is a history of alcohol use. consumed socially. Medications (active prior to today)  Medication Name Sig Description Start Date Stop Date Refilled Rx Elsewhere   Toviaz 4 mg tablet,extended release take 1 Tablet by Oral route  every day for 1 day 05/11/2020   N     Medication Reconciliation  Medications reconciled today. Medication Reviewed  Adherence Medication Name Sig Desc Elsewhere Status   taking as directed Toviaz 4 mg tablet,extended release take 1 Tablet by Oral route  every day for 1 day N Verified     Allergies:  Ingredient Reaction (Severity) Medication Name Comment   NO KNOWN ALLERGIES      Reviewed, no changes. Review of Systems  System Neg/Pos Details   Constitutional Negative Fatigue, Fever, Night sweats, Weight gain and Weight loss. ENMT Negative Hearing loss, Tinnitus, Vertigo and Voice change. Eyes Negative Diplopia and Vision loss. Respiratory Negative Asthma, Cough, Dyspnea, Hemoptysis, Known TB exposure and Wheezing. Cardio Negative Chest pain, Claudication, Edema, Irregular heartbeat/palpitations and Thrombophlebitis. GI Negative Abdominal distention, Anorexia, Bloating, Blood in stool, Constipation, Diarrhea, Dysphagia, Epigastric pain, Eructation, Flatulence, Heartburn, Hemorrhoids, Jaundice, Milk/diary intolerance, Nausea, Post prandial fullness, Reflux and Vomiting.  Negative Back pain, Dysuria, Flank pain, Hematuria, Menstruation, Nocturia, Passage stone/gravel, Scrotal swelling and Urinary incontinence. Endocrine Negative Cold intolerance, Diaphoresis and Goiter.    Neuro Negative Dizziness, Focal weakness, Headache, Lightheadedness, Paresthesia, Seizures and Syncope. Integumentary Negative Change in shape/size of mole(s) and Skin lesion. MS Negative Back pain, Bone/joint symptoms, Muscle weakness and Myalgia. Hema/Lymph Negative Easy bleeding and Easy bruising. Allergic/Immuno Negative Contact allergy and Contact dermatitis. Vital Signs   Height  Time ft in cm Last Measured Height Position   1:07 PM 6.0 0.00 182.88 07/02/2020 Standing   Weight/BSA/BMI  Time lb oz kg Context BMI kg/m2 BSA m2   1:07 .00  104.780 dressed with shoes 31.33    Blood Pressure  Time BP mm/Hg Position Side Site Method Cuff Size   1:07 /72 sitting left arm manual adult   Temperature/Pulse/Respiration  Time Temp F Temp C Temp Site Pulse/min Pattern Resp/ min   1:07 PM 97.90 36.61 oral 55 regular 12   Pulse Oximetry/FIO2  Time Pulse Ox (Rest %) Pulse Ox (Amb %) O2 Sat O2 L/Min Timing FiO2 % L/min Delivery Method Finger Probe   1:07 PM 94  RA      R Index   Pain Scale  Time Pain Score Method   1:07 PM 0/10 Numeric Pain Intensity Scale   Measured By  Time Measured by   1:07 PM Tennille Cruz     Physical Exam:  Exam Findings Details   Constitutional Normal Well developed. Eyes Normal Conjunctiva - Right: Normal, Left: Normal. Pupil - Right: Normal, Left: Normal.   Ears Normal Inspection - Right: Normal, Left: Normal. Hearing - Right: Normal, Left: Normal.   Nose/Mouth/Throat Normal External nose - Normal. Lips/teeth/gums - Normal. Oropharynx - Normal.   Neck Exam Normal Inspection - Normal. Palpation - Normal. Thyroid gland - Normal.   Lymph Detail Normal No cervical, supraclavicular, or axillary adenopathy. Respiratory Normal Inspection - Normal. Auscultation - Normal. Effort - Normal.   Cardiovascular Normal Regular rate and rhythm. No murmurs, gallops, or rubs. Vascular Normal Pulses - Dorsalis pedis: Normal. Capillary refill - Less than 2 seconds. Abdomen Normal Inspection - Normal. No abdominal tenderness.  No hepatic enlargement. No spleen enlargement. No hernia. Genitourinary * Hernia - Hernia Type: inguinal. Location: right, Reducible. Genitourinary Normal Scrotum - Normal. Testes - Normal.   Skin Normal Inspection - Normal.   Musculoskeletal Normal Visual overview of all four extremities is normal.   Extremity Normal No edema. Neurological Normal Memory - Normal. Cranial nerves - Cranial nerves I grossly intact, Cranial nerves II through XII grossly intact. Psychiatric Normal Orientation - Oriented to time, place, person & situation. Appropriate mood and affect. Normal insight. Normal judgment.            Medications (added, continued, or stopped this visit):  Start Date Medication Directions PRN Status PRN Reason Instruction Stop Date   05/11/2020 Toviaz 4 mg tablet,extended release take 1 Tablet by Oral route  every day for 1 day N            Active Patient Care Team Members    Name Contact Agency Type Support Role Relationship Active Date Inactive Date Specialty   Ifjulietnyichukwu Ani   encounter provider    Urology   Pily Minor   Emergency Contact Spouse      Nela Peña   Patient provider PCP      Nela Peña   primary care provider

## 2020-08-21 ENCOUNTER — HOSPITAL ENCOUNTER (OUTPATIENT)
Age: 75
Setting detail: OUTPATIENT SURGERY
Discharge: HOME | End: 2020-08-21
Attending: SURGERY | Admitting: SURGERY
Payer: MEDICARE

## 2020-08-21 ENCOUNTER — ANESTHESIA (OUTPATIENT)
Dept: SURGERY | Age: 75
End: 2020-08-21
Payer: MEDICARE

## 2020-08-21 VITALS
TEMPERATURE: 97.5 F | RESPIRATION RATE: 18 BRPM | OXYGEN SATURATION: 100 % | HEART RATE: 64 BPM | WEIGHT: 227.44 LBS | DIASTOLIC BLOOD PRESSURE: 72 MMHG | SYSTOLIC BLOOD PRESSURE: 124 MMHG | BODY MASS INDEX: 30.8 KG/M2 | HEIGHT: 72 IN

## 2020-08-21 DIAGNOSIS — K40.90 INGUINAL HERNIA WITHOUT OBSTRUCTION OR GANGRENE, RECURRENCE NOT SPECIFIED, UNSPECIFIED LATERALITY: Primary | ICD-10-CM

## 2020-08-21 PROCEDURE — 77030002966 HC SUT PDS J&J -A: Performed by: SURGERY

## 2020-08-21 PROCEDURE — 74011000250 HC RX REV CODE- 250: Performed by: NURSE ANESTHETIST, CERTIFIED REGISTERED

## 2020-08-21 PROCEDURE — C1781 MESH (IMPLANTABLE): HCPCS | Performed by: SURGERY

## 2020-08-21 PROCEDURE — 77030016151 HC PROTCTR LNS DFOG COVD -B: Performed by: SURGERY

## 2020-08-21 PROCEDURE — 74011250636 HC RX REV CODE- 250/636: Performed by: NURSE ANESTHETIST, CERTIFIED REGISTERED

## 2020-08-21 PROCEDURE — 77030035277 HC OBTRTR BLDELSS DISP INTU -B: Performed by: SURGERY

## 2020-08-21 PROCEDURE — 77030022704 HC SUT VLOC COVD -B: Performed by: SURGERY

## 2020-08-21 PROCEDURE — 74011000258 HC RX REV CODE- 258: Performed by: SURGERY

## 2020-08-21 PROCEDURE — 76060000034 HC ANESTHESIA 1.5 TO 2 HR: Performed by: SURGERY

## 2020-08-21 PROCEDURE — 76210000021 HC REC RM PH II 0.5 TO 1 HR: Performed by: SURGERY

## 2020-08-21 PROCEDURE — 76010000875 HC OR TIME 1.5 TO 2HR INTENSV - TIER 2: Performed by: SURGERY

## 2020-08-21 PROCEDURE — 77030003578 HC NDL INSUF VERES AMR -B: Performed by: SURGERY

## 2020-08-21 PROCEDURE — 76210000006 HC OR PH I REC 0.5 TO 1 HR: Performed by: SURGERY

## 2020-08-21 PROCEDURE — 77030031492 HC PRT ACC BLNT AIRSEAL CNMD -B: Performed by: SURGERY

## 2020-08-21 PROCEDURE — 77030002933 HC SUT MCRYL J&J -A: Performed by: SURGERY

## 2020-08-21 PROCEDURE — 74011000272 HC RX REV CODE- 272: Performed by: SURGERY

## 2020-08-21 PROCEDURE — 77030012770 HC TRCR OPT FX AMR -B: Performed by: SURGERY

## 2020-08-21 PROCEDURE — 77030040361 HC SLV COMPR DVT MDII -B: Performed by: SURGERY

## 2020-08-21 PROCEDURE — 77030033200 HC PRT CLSR CRTR THOMP COOP -C: Performed by: SURGERY

## 2020-08-21 PROCEDURE — 77030010507 HC ADH SKN DERMBND J&J -B: Performed by: SURGERY

## 2020-08-21 PROCEDURE — 74011000250 HC RX REV CODE- 250: Performed by: SURGERY

## 2020-08-21 PROCEDURE — 77030020782 HC GWN BAIR PAWS FLX 3M -B: Performed by: SURGERY

## 2020-08-21 PROCEDURE — 77030008606 HC TRCR ENDOSC KII AMR -B: Performed by: SURGERY

## 2020-08-21 PROCEDURE — 74011250636 HC RX REV CODE- 250/636: Performed by: SURGERY

## 2020-08-21 DEVICE — LAPAROSCOPIC SELF-FIXATING MESH POLYESTER WITH POLYLACTIC ACID GRIPS AND COLLAGEN FILM
Type: IMPLANTABLE DEVICE | Site: GROIN | Status: FUNCTIONAL
Brand: PROGRIP

## 2020-08-21 RX ORDER — ONDANSETRON 2 MG/ML
4 INJECTION INTRAMUSCULAR; INTRAVENOUS ONCE
Status: DISCONTINUED | OUTPATIENT
Start: 2020-08-21 | End: 2020-08-21 | Stop reason: HOSPADM

## 2020-08-21 RX ORDER — FENTANYL CITRATE 50 UG/ML
INJECTION, SOLUTION INTRAMUSCULAR; INTRAVENOUS AS NEEDED
Status: DISCONTINUED | OUTPATIENT
Start: 2020-08-21 | End: 2020-08-21 | Stop reason: HOSPADM

## 2020-08-21 RX ORDER — PROPOFOL 10 MG/ML
INJECTION, EMULSION INTRAVENOUS AS NEEDED
Status: DISCONTINUED | OUTPATIENT
Start: 2020-08-21 | End: 2020-08-21 | Stop reason: HOSPADM

## 2020-08-21 RX ORDER — MIDAZOLAM HYDROCHLORIDE 1 MG/ML
INJECTION, SOLUTION INTRAMUSCULAR; INTRAVENOUS AS NEEDED
Status: DISCONTINUED | OUTPATIENT
Start: 2020-08-21 | End: 2020-08-21 | Stop reason: HOSPADM

## 2020-08-21 RX ORDER — MAGNESIUM SULFATE 100 %
4 CRYSTALS MISCELLANEOUS AS NEEDED
Status: DISCONTINUED | OUTPATIENT
Start: 2020-08-21 | End: 2020-08-21 | Stop reason: HOSPADM

## 2020-08-21 RX ORDER — ONDANSETRON 2 MG/ML
INJECTION INTRAMUSCULAR; INTRAVENOUS AS NEEDED
Status: DISCONTINUED | OUTPATIENT
Start: 2020-08-21 | End: 2020-08-21 | Stop reason: HOSPADM

## 2020-08-21 RX ORDER — LIDOCAINE HYDROCHLORIDE 20 MG/ML
INJECTION, SOLUTION EPIDURAL; INFILTRATION; INTRACAUDAL; PERINEURAL AS NEEDED
Status: DISCONTINUED | OUTPATIENT
Start: 2020-08-21 | End: 2020-08-21 | Stop reason: HOSPADM

## 2020-08-21 RX ORDER — DEXAMETHASONE SODIUM PHOSPHATE 4 MG/ML
INJECTION, SOLUTION INTRA-ARTICULAR; INTRALESIONAL; INTRAMUSCULAR; INTRAVENOUS; SOFT TISSUE AS NEEDED
Status: DISCONTINUED | OUTPATIENT
Start: 2020-08-21 | End: 2020-08-21 | Stop reason: HOSPADM

## 2020-08-21 RX ORDER — SODIUM CHLORIDE 0.9 % (FLUSH) 0.9 %
5-40 SYRINGE (ML) INJECTION AS NEEDED
Status: DISCONTINUED | OUTPATIENT
Start: 2020-08-21 | End: 2020-08-21 | Stop reason: HOSPADM

## 2020-08-21 RX ORDER — INSULIN LISPRO 100 [IU]/ML
INJECTION, SOLUTION INTRAVENOUS; SUBCUTANEOUS ONCE
Status: DISCONTINUED | OUTPATIENT
Start: 2020-08-21 | End: 2020-08-21 | Stop reason: HOSPADM

## 2020-08-21 RX ORDER — FENTANYL CITRATE 50 UG/ML
25 INJECTION, SOLUTION INTRAMUSCULAR; INTRAVENOUS AS NEEDED
Status: DISCONTINUED | OUTPATIENT
Start: 2020-08-21 | End: 2020-08-21 | Stop reason: HOSPADM

## 2020-08-21 RX ORDER — OXYCODONE AND ACETAMINOPHEN 5; 325 MG/1; MG/1
1 TABLET ORAL
Qty: 20 TAB | Refills: 0 | Status: SHIPPED | OUTPATIENT
Start: 2020-08-21 | End: 2020-08-26

## 2020-08-21 RX ORDER — NALOXONE HYDROCHLORIDE 0.4 MG/ML
0.1 INJECTION, SOLUTION INTRAMUSCULAR; INTRAVENOUS; SUBCUTANEOUS AS NEEDED
Status: DISCONTINUED | OUTPATIENT
Start: 2020-08-21 | End: 2020-08-21 | Stop reason: HOSPADM

## 2020-08-21 RX ORDER — HYDROMORPHONE HYDROCHLORIDE 2 MG/ML
INJECTION, SOLUTION INTRAMUSCULAR; INTRAVENOUS; SUBCUTANEOUS AS NEEDED
Status: DISCONTINUED | OUTPATIENT
Start: 2020-08-21 | End: 2020-08-21 | Stop reason: HOSPADM

## 2020-08-21 RX ORDER — HYDROMORPHONE HYDROCHLORIDE 1 MG/ML
0.5 INJECTION, SOLUTION INTRAMUSCULAR; INTRAVENOUS; SUBCUTANEOUS
Status: DISCONTINUED | OUTPATIENT
Start: 2020-08-21 | End: 2020-08-21 | Stop reason: HOSPADM

## 2020-08-21 RX ORDER — ROCURONIUM BROMIDE 10 MG/ML
INJECTION, SOLUTION INTRAVENOUS AS NEEDED
Status: DISCONTINUED | OUTPATIENT
Start: 2020-08-21 | End: 2020-08-21 | Stop reason: HOSPADM

## 2020-08-21 RX ORDER — BUPIVACAINE HYDROCHLORIDE 2.5 MG/ML
INJECTION, SOLUTION EPIDURAL; INFILTRATION; INTRACAUDAL AS NEEDED
Status: DISCONTINUED | OUTPATIENT
Start: 2020-08-21 | End: 2020-08-21 | Stop reason: HOSPADM

## 2020-08-21 RX ORDER — EPHEDRINE SULFATE/0.9% NACL/PF 50 MG/5 ML
SYRINGE (ML) INTRAVENOUS AS NEEDED
Status: DISCONTINUED | OUTPATIENT
Start: 2020-08-21 | End: 2020-08-21 | Stop reason: HOSPADM

## 2020-08-21 RX ORDER — SODIUM CHLORIDE, SODIUM LACTATE, POTASSIUM CHLORIDE, CALCIUM CHLORIDE 600; 310; 30; 20 MG/100ML; MG/100ML; MG/100ML; MG/100ML
100 INJECTION, SOLUTION INTRAVENOUS CONTINUOUS
Status: DISCONTINUED | OUTPATIENT
Start: 2020-08-21 | End: 2020-08-21 | Stop reason: HOSPADM

## 2020-08-21 RX ORDER — SODIUM CHLORIDE, SODIUM LACTATE, POTASSIUM CHLORIDE, CALCIUM CHLORIDE 600; 310; 30; 20 MG/100ML; MG/100ML; MG/100ML; MG/100ML
125 INJECTION, SOLUTION INTRAVENOUS CONTINUOUS
Status: DISCONTINUED | OUTPATIENT
Start: 2020-08-21 | End: 2020-08-21 | Stop reason: HOSPADM

## 2020-08-21 RX ORDER — SUCCINYLCHOLINE CHLORIDE 100 MG/5ML
SYRINGE (ML) INTRAVENOUS AS NEEDED
Status: DISCONTINUED | OUTPATIENT
Start: 2020-08-21 | End: 2020-08-21 | Stop reason: HOSPADM

## 2020-08-21 RX ORDER — SODIUM CHLORIDE 0.9 % (FLUSH) 0.9 %
5-40 SYRINGE (ML) INJECTION EVERY 8 HOURS
Status: DISCONTINUED | OUTPATIENT
Start: 2020-08-21 | End: 2020-08-21 | Stop reason: HOSPADM

## 2020-08-21 RX ORDER — DEXTROSE MONOHYDRATE 100 MG/ML
125-250 INJECTION, SOLUTION INTRAVENOUS AS NEEDED
Status: DISCONTINUED | OUTPATIENT
Start: 2020-08-21 | End: 2020-08-21 | Stop reason: HOSPADM

## 2020-08-21 RX ADMIN — Medication 10 MG: at 10:42

## 2020-08-21 RX ADMIN — MIDAZOLAM 2 MG: 1 INJECTION INTRAMUSCULAR; INTRAVENOUS at 10:16

## 2020-08-21 RX ADMIN — Medication 150 MG: at 10:22

## 2020-08-21 RX ADMIN — HYDROMORPHONE HYDROCHLORIDE 1 MG: 2 INJECTION, SOLUTION INTRAMUSCULAR; INTRAVENOUS; SUBCUTANEOUS at 10:55

## 2020-08-21 RX ADMIN — LIDOCAINE HYDROCHLORIDE 100 MG: 20 INJECTION, SOLUTION EPIDURAL; INFILTRATION; INTRACAUDAL; PERINEURAL at 10:22

## 2020-08-21 RX ADMIN — Medication 10 MG: at 10:31

## 2020-08-21 RX ADMIN — DEXAMETHASONE SODIUM PHOSPHATE 4 MG: 4 INJECTION, SOLUTION INTRAMUSCULAR; INTRAVENOUS at 10:34

## 2020-08-21 RX ADMIN — ROCURONIUM BROMIDE 35 MG: 10 INJECTION, SOLUTION INTRAVENOUS at 10:29

## 2020-08-21 RX ADMIN — SODIUM CHLORIDE, SODIUM LACTATE, POTASSIUM CHLORIDE, AND CALCIUM CHLORIDE 125 ML/HR: 600; 310; 30; 20 INJECTION, SOLUTION INTRAVENOUS at 07:45

## 2020-08-21 RX ADMIN — SODIUM CHLORIDE, SODIUM LACTATE, POTASSIUM CHLORIDE, AND CALCIUM CHLORIDE: 600; 310; 30; 20 INJECTION, SOLUTION INTRAVENOUS at 11:02

## 2020-08-21 RX ADMIN — SUGAMMADEX 200 MG: 100 INJECTION, SOLUTION INTRAVENOUS at 11:39

## 2020-08-21 RX ADMIN — CEFOXITIN SODIUM 2 G: 2 POWDER, FOR SOLUTION INTRAVENOUS at 10:27

## 2020-08-21 RX ADMIN — FENTANYL CITRATE 100 MCG: 50 INJECTION, SOLUTION INTRAMUSCULAR; INTRAVENOUS at 10:20

## 2020-08-21 RX ADMIN — ONDANSETRON HYDROCHLORIDE 4 MG: 2 INJECTION INTRAMUSCULAR; INTRAVENOUS at 11:34

## 2020-08-21 RX ADMIN — PROPOFOL 180 MG: 10 INJECTION, EMULSION INTRAVENOUS at 10:22

## 2020-08-21 RX ADMIN — ROCURONIUM BROMIDE 10 MG: 10 INJECTION, SOLUTION INTRAVENOUS at 10:35

## 2020-08-21 RX ADMIN — ROCURONIUM BROMIDE 5 MG: 10 INJECTION, SOLUTION INTRAVENOUS at 10:20

## 2020-08-21 NOTE — PERIOP NOTES
Discharge instructions completed via phone call to Arlene Pinzon- opportunity for questions- AVS med list reviewed for duplicates

## 2020-08-21 NOTE — PERIOP NOTES
Spoke with  and informed him that the patient reports seeing blood in his stool last night. Patient denies any further blood and states that he is not experiencing any pain/discomfort.

## 2020-08-21 NOTE — PERIOP NOTES
Reviewed PTA medication list with patient/caregiver and patient/caregiver denies any additional medications. Patient admits to having a responsible adult care for them at home for at least 24 hours after surgery. Patient encouraged to use gown warming system and informed that using said warming gown to regulate body temperature prior to a procedure has been shown to help reduce the risks of blood clots and infection. Dual skin assessment & fall risk band verification completed with Ilana Kwon RN.

## 2020-08-21 NOTE — BRIEF OP NOTE
Brief Postoperative Note    Patient: Ronel Carrasquillo  YOB: 1945  MRN: [id number]    Date of Procedure: 8/21/2020     Pre-Op Diagnosis: RIGHT INUGINAL HERNIA    Post-Op Diagnosis: Same as preoperative diagnosis. Procedure(s):  ROBOTIC RIGHT INGUINAL HERNIA REPAIR    Surgeon(s): Real Helton MD    Surgical Assistant: None    Anesthesia: General     Estimated Blood Loss (mL): Minimal    Complications: None    Specimens: * No specimens in log *     Implants:   Implant Name Type Inv.  Item Serial No.  Lot No. LRB No. Used Action   MESH ABSORB SURG PROGRIP 10X15 -- PROGRIP - [id number]  MESH ABSORB SURG PROGRIP 10X15 -- [address] [id number] Implanted       Drains: * No LDAs found *    Findings: [facility]    Electronically Signed by Ta Rinaldi MD on 8/21/2020 at 11:46 AM

## 2020-08-21 NOTE — ANESTHESIA PREPROCEDURE EVALUATION
Relevant Problems   No relevant active problems       Anesthetic History   No history of anesthetic complications            Review of Systems / Medical History  Patient summary reviewed, nursing notes reviewed and pertinent labs reviewed    Pulmonary  Within defined limits                 Neuro/Psych   Within defined limits           Cardiovascular            Dysrhythmias (fab)   Hyperlipidemia    Exercise tolerance: >4 METS     GI/Hepatic/Renal  Within defined limits           Pertinent negatives: No PUD and hepatitis   Endo/Other          Pertinent negatives: No diabetes, hypothyroidism and hyperthyroidism   Other Findings              Physical Exam    Airway  Mallampati: II  TM Distance: 4 - 6 cm  Neck ROM: normal range of motion   Mouth opening: Normal     Cardiovascular  Regular rate and rhythm,  S1 and S2 normal,  no murmur, click, rub, or gallop  Rhythm: regular  Rate: normal         Dental         Pulmonary  Breath sounds clear to auscultation               Abdominal  GI exam deferred       Other Findings            Anesthetic Plan    ASA: 2  Anesthesia type: general          Induction: Intravenous  Anesthetic plan and risks discussed with: Patient

## 2020-08-21 NOTE — ANESTHESIA POSTPROCEDURE EVALUATION
Procedure(s):  ROBOTIC RIGHT INGUINAL HERNIA REPAIR - NEURECTOMY.     general    Anesthesia Post Evaluation      Multimodal analgesia: multimodal analgesia used between 6 hours prior to anesthesia start to PACU discharge  Patient location during evaluation: PACU  Level of consciousness: awake  Pain management: adequate  Airway patency: patent  Anesthetic complications: no  Cardiovascular status: acceptable  Respiratory status: acceptable  Hydration status: acceptable  Post anesthesia nausea and vomiting:  none  Final Post Anesthesia Temperature Assessment:  Normothermia (36.0-37.5 degrees C)      INITIAL Post-op Vital signs:   Vitals Value Taken Time   /72 8/21/2020  1:30 PM   Temp 36.4 °C (97.5 °F) 8/21/2020  1:34 PM   Pulse 64 8/21/2020  1:33 PM   Resp 18 8/21/2020  1:34 PM   SpO2 100 % 8/21/2020  1:34 PM

## 2020-08-21 NOTE — DISCHARGE INSTRUCTIONS
Post-Operative Discharge Instructions  Gordy Nguyen. Anitra Martinez M.D.  [address] [address] Lianna Iglesias  [phone]    Patient: Gordy Fisher MRN: [id number]  CSN: [id number]    YOB: 1945  Age: 75 y.o. Sex: male    DOA: 8/21/2020 LOS:  LOS: 0 days   Discharge Date:      Acute Diagnoses:  RIGHT INUGINAL HERNIA    Chronic Medical Diagnoses:  Problem List as of 8/21/2020 Date Reviewed: 3/12/2020          Codes Class Noted - Resolved    UTI (urinary tract infection) ICD-10-CM: N39.0  ICD-9-CM: 599.0  8/15/2019 - Present        CASISE (acute kidney injury) ([facility] ICD-10-CM: N17.9  ICD-9-CM: 584.9  8/15/2019 - Present        Septic shock ([facility] ICD-10-CM: A41.9, R65.21  ICD-9-CM: 038.9, 785.52, 995.92  8/15/2019 - Present        Atrial arrhythmia ICD-10-CM: I49.8  ICD-9-CM: 427.9  10/12/2017 - Present        Dyslipidemia ICD-10-CM: E78.5  ICD-9-CM: 272.4  Unknown - Present        Bradycardia ICD-10-CM: R00.1  ICD-9-CM: 427.89  Unknown - Present        Precordial pain ICD-10-CM: R07.2  ICD-9-CM: 786.51  9/27/2017 - Present        Advance directive discussed with patient ICD-10-CM: Z71.89  ICD-9-CM: V65.49  9/26/2016 - Present    Overview Signed 9/26/2016 12:58 PM by Janel Mcdonald LPN     Patient will take home paperwork to fill out and bring in a copy to scan in chart. Diet  1. Resume prior to surgery diet as tolerated. Activity  1. Do not drive a car or operate any hazardous machinery the day of surgery. 2. Rest quietly today. 3. No bending or heavy lifting. 4. You may resume other prior to surgery activities as tolerated. 5. You may remove the bandage and shower in 1 day. Drain / Wound Care  1. Follow all drain / wound care instructions exactly as explained by the Nurse at time of discharge. 2. Apply an ice pack to the surgical site for 48 hours. 3. Do not put any salves or ointments on the wound. Allow it to form a dry scab.   4. Leave steri-strips / Dermabond alone. They should be allowed to fall off on their own in 7-14 days. Medications  1. It is important to take your medications exactly as they are prescribed. 2. Keep your medication in the bottles provided by the pharmacist, and keep a list of the medication names, dosages, and times they should be taken in your wallet. Call 911 anytime you think you may need emergency care. For example, call if:  · You passed out (lost consciousness). · You have severe trouble breathing. · You have sudden chest pain and shortness of breath. Notify your Surgeon for any of the followin. Fever, chills, nausea, vomiting, severe abdominal pain or bleeding. 2. If you experience any redness or discharge or sign of infection. 3. Persistent nausea lasting more than 24 hours. If you are unable to reach your Surgeon for any of the symptoms above, you should proceed directly to the nearest Emergency Department. Post-Operative Appointment Information    Call Dr. Kirsten Perry office tomorrow morning at [phone] to schedule a post-operative office visit in one (1) week. If any questions or concerns arise, call your Surgeon at [phone]. DISCHARGE SUMMARY from Nurse    PATIENT INSTRUCTIONS:    After general anesthesia or intravenous sedation, for 24 hours or while taking prescription Narcotics:  · Limit your activities  · Do not drive and operate hazardous machinery  · Do not make important personal or business decisions  · Do  not drink alcoholic beverages  · If you have not urinated within 8 hours after discharge, please contact your surgeon on call.     Report the following to your surgeon:  · Excessive pain, swelling, redness or odor of or around the surgical area  · Temperature over 100.5  · Nausea and vomiting lasting longer than 4 hours or if unable to take medications  · Any signs of decreased circulation or nerve impairment to extremity: change in color, persistent  numbness, tingling, coldness or increase pain  · Any questions    What to do at Home:  Recommended activity: Ambulate in house, No driving while on analgesics and No heavy lifting until advised     If you experience any of the following symptoms above, please follow up with Dr. Marc Connolly. *  Please give a list of your current medications to your Primary Care Provider. *  Please update this list whenever your medications are discontinued, doses are      changed, or new medications (including over-the-counter products) are added. *  Please carry medication information at all times in case of emergency situations. These are general instructions for a healthy lifestyle:    No smoking/ No tobacco products/ Avoid exposure to second hand smoke  Surgeon General's Warning:  Quitting smoking now greatly reduces serious risk to your health. Obesity, smoking, and sedentary lifestyle greatly increases your risk for illness    A healthy diet, regular physical exercise & weight monitoring are important for maintaining a healthy lifestyle    You may be retaining fluid if you have a history of heart failure or if you experience any of the following symptoms:  Weight gain of 3 pounds or more overnight or 5 pounds in a week, increased swelling in our hands or feet or shortness of breath while lying flat in bed. Please call your doctor as soon as you notice any of these symptoms; do not wait until your next office visit. Patient armband removed and shredded    The discharge information has been reviewed with the patient and caregiver. The patient and caregiver verbalized understanding. Discharge medications reviewed with the patient and caregiver and appropriate educational materials and side effects teaching were provided. Learning About Coronavirus [phone])  Coronavirus [phone]): Overview  What is coronavirus (COVID-19)? The coronavirus disease (COVID-19) is caused by a virus.  It is an illness that was [facility]. It has since spread worldwide. The virus can cause fever, cough, and trouble breathing. In severe cases, it can cause pneumonia and make it hard to breathe without help. It can cause death. Coronaviruses are a large group of viruses. They cause the common cold. They also cause more serious illnesses like Middle East respiratory syndrome (MERS) and severe acute respiratory syndrome (SARS). COVID-19 is caused by a novel coronavirus. That means it's a new type that has not been seen in people before. This virus spreads person-to-person through droplets from coughing and sneezing. It can also spread when you are close to someone who is infected. And it can spread when you touch something that has the virus on it, such as a doorknob or a tabletop. What can you do to protect yourself from coronavirus (COVID-19)? The best way to protect yourself from getting sick is to:  · Avoid areas where there is an outbreak. · Avoid contact with people who may be infected. · Wash your hands often with soap or alcohol-based hand sanitizers. · Avoid crowds and try to stay at least 6 feet away from other people. · Wash your hands often, especially after you cough or sneeze. Use soap and water, and scrub for at least 20 seconds. If soap and water aren't available, use an alcohol-based hand . · Avoid touching your mouth, nose, and eyes. What can you do to avoid spreading the virus to others? To help avoid spreading the virus to others:  · Cover your mouth with a tissue when you cough or sneeze. Then throw the tissue in the trash. · Use a disinfectant to clean things that you touch often. · Wear a cloth face cover if you have to go to public areas. · Stay home if you are sick or have been exposed to the virus. Don't go to school, work, or public areas. And don't use public transportation, ride-shares, or taxis unless you have no choice.   · If you are sick:  ? Leave your home only if you need to get medical care. But call the doctor's office first so they know you're coming. And wear a face cover. ? Wear the face cover whenever you're around other people. It can help stop the spread of the virus when you cough or sneeze. ? Clean and disinfect your home every day. Use household  and disinfectant wipes or sprays. Take special care to clean things that you grab with your hands. These include doorknobs, remote controls, phones, and handles on your refrigerator and microwave. And don't forget countertops, tabletops, bathrooms, and computer keyboards. When to call for help  [id number] anytime you think you may need emergency care. For example, call if:  · You have severe trouble breathing. (You can't talk at all.)  · You have constant chest pain or pressure. · You are severely dizzy or lightheaded. · You are confused or can't think clearly. · Your face and lips have a blue color. · You pass out (lose consciousness) or are very hard to wake up. Call your doctor now if you develop symptoms such as:  · Shortness of breath. · Fever. · Cough. If you need to get care, call ahead to the doctor's office for instructions before you go. Make sure you wear a face cover to prevent exposing other people to the virus. Where can you get the latest information? The following health organizations are tracking and studying this virus. Their websites contain the most up-to-date information. Jolynn Mayte also learn what to do if you think you may have been exposed to the virus. · U.S. Centers for Disease Control and Prevention (CDC): The CDC provides updated news about the disease and travel advice. The website also tells you how to prevent the spread of infection. www.cdc.gov  · World Health Organization [facility]): WHO offers information about the virus outbreaks. WHO also has travel advice. www.who.int  Current as of:  May 8, 2020               Content Version: 12.5  © 3532-8725 [employer]. Care instructions adapted under license by [employer] (which disclaims liability or warranty for this information). If you have questions about a medical condition or this instruction, always ask your healthcare professional. [address] any warranty or liability for your use of this information.     ___________________________________________________________________________________________________________________________________

## 2020-08-21 NOTE — INTERVAL H&P NOTE
Update History & Physical 
 
The Patient's History and Physical of August 21,  
 was reviewed with the patient and I examined the patient. There was no change. The surgical site was confirmed by the patient and me. Plan:  The risk, benefits, expected outcome, and alternative to the recommended procedure have been discussed with the patient. Patient understands and wants to proceed with the procedure.  
 
Electronically signed by Rachele Orellana MD on 8/21/2020 at 10:05 AM

## 2020-08-22 NOTE — OP NOTES
[facility]    Name:  Ezra Dotson  MR#:   [id number]  :  1945  ACCOUNT #:  [de-identified]  DATE OF SERVICE:  2020    PREOPERATIVE DIAGNOSIS:  Right inguinal hernia. POSTOPERATIVE DIAGNOSES:  1. Right inguinal hernia. 2.  Neuroma. PROCEDURE PERFORMED:  1.  Robotic laparoscopic repair of right inguinal hernia. 2.  Neurectomy with implantation. SURGEON:  Gudelia Haskins MD    ASSISTANT:  None. ANESTHESIA:  General endotracheal.    COMPLICATIONS:  None. SPECIMENS REMOVED:  None. IMPLANTS:  None. ESTIMATED BLOOD LOSS:  Minimal.    INDICATION:  This gentleman presents with a right inguinal hernia. He will undergo repair. I have discussed risks, benefits, alternatives to him including the risk of bleeding, infection, reoperation, recurrence, use of mesh, possible loss of testicle, chronic pain, possible neurectomy, or chronic postoperative pain. He understands and wishes to proceed. PROCEDURE:  He was placed in the lithotomy position. His abdomen was prepped and draped in the usual fashion. A Veress needle was inserted in the left upper quadrant area using one-drop technique after pneumoperitoneum was established. A 5 mm Optiview trocar was placed in the supraumbilical position. This was dilated to a 12 mm camera port. An 8 mm AirSeal port was placed in the epigastrium and two 8 mm robotic ports were placed, one in the right upper quadrant laterally, one in the left upper quadrant laterally under visualization. The robot was docked in the routine fashion. The patient was found to have a large right inguinal hernia. He had a large indirect defect with a large lipoma as well as a smaller direct defect. The peritoneal flap was taken down using the Monopolar scissors and after an adequate flap was taken down as far inferior as possible and dissected away from the cord structures, any lipomatous tissue was excised.   The patient did have a large lipoma, and some of the lipomatous tissue was excised. All critical views and spaces were identified and confirmed. 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 As was discussed with the patient preoperatively, a neurectomy was done to prevent postoperative chronic pain. This was done proximally and distally using the monopolar scissors and the proximal nerve was embedded into the muscle tissue. Next, laparoscopic ProGrip mesh was placed over the inguinal floor and allowed to self-. There was good overlay and overlap of the mesh on the defect. The peritoneal flap was then closed using a 2-0 PDS V-Loc continuous suture. There was good hemostasis. All ports were removed. The fascial incision at the 12 mm port was closed with 0 PDS suture. Skin incisions were closed with 4-0 Monocryl subcuticular closure with Dermabond. The patient tolerated the procedure well.       MD ALIZE [email or web address]  D:  08/21/2020 19:03  T:  08/21/2020 23:05  JOB #:  [id number]

## 2021-04-03 ENCOUNTER — APPOINTMENT (OUTPATIENT)
Dept: CT IMAGING | Age: 76
End: 2021-04-03
Attending: EMERGENCY MEDICINE
Payer: MEDICARE

## 2021-04-03 ENCOUNTER — HOSPITAL ENCOUNTER (EMERGENCY)
Age: 76
Discharge: HOME | End: 2021-04-03
Attending: EMERGENCY MEDICINE
Payer: MEDICARE

## 2021-04-03 VITALS
RESPIRATION RATE: 18 BRPM | SYSTOLIC BLOOD PRESSURE: 126 MMHG | OXYGEN SATURATION: 100 % | TEMPERATURE: 98.1 F | DIASTOLIC BLOOD PRESSURE: 79 MMHG | HEART RATE: 61 BPM

## 2021-04-03 DIAGNOSIS — N13.8 BPH WITH URINARY OBSTRUCTION: ICD-10-CM

## 2021-04-03 DIAGNOSIS — N40.1 BPH WITH URINARY OBSTRUCTION: ICD-10-CM

## 2021-04-03 DIAGNOSIS — R33.9 URINARY RETENTION: Primary | ICD-10-CM

## 2021-04-03 LAB
ALBUMIN SERPL-MCNC: 4 G/DL (ref 3.5–5)
ALBUMIN/GLOB SERPL: 1.1 {RATIO} (ref 1.1–2.2)
ALP SERPL-CCNC: 85 U/L (ref 45–117)
ALT SERPL-CCNC: 24 U/L (ref 12–78)
ANION GAP SERPL CALC-SCNC: 12 MMOL/L (ref 5–15)
APPEARANCE UR: CLEAR
AST SERPL-CCNC: 23 U/L (ref 15–37)
BACTERIA URNS QL MICRO: NEGATIVE /HPF
BASOPHILS # BLD: 0.1 K/UL (ref 0–0.1)
BASOPHILS NFR BLD: 1 % (ref 0–1)
BILIRUB SERPL-MCNC: 0.5 MG/DL (ref 0.2–1)
BILIRUB UR QL: NEGATIVE
BUN SERPL-MCNC: 14 MG/DL (ref 6–20)
BUN/CREAT SERPL: 13 (ref 12–20)
CALCIUM SERPL-MCNC: 9.1 MG/DL (ref 8.5–10.1)
CHLORIDE SERPL-SCNC: 102 MMOL/L (ref 97–108)
CO2 SERPL-SCNC: 27 MMOL/L (ref 21–32)
COLOR UR: ABNORMAL
CREAT SERPL-MCNC: 1.11 MG/DL (ref 0.7–1.3)
DIFFERENTIAL METHOD BLD: NORMAL
EOSINOPHIL # BLD: 0.2 K/UL (ref 0–0.4)
EOSINOPHIL NFR BLD: 3 % (ref 0–7)
EPITH CASTS URNS QL MICRO: ABNORMAL /LPF
ERYTHROCYTE [DISTWIDTH] IN BLOOD BY AUTOMATED COUNT: 12.7 % (ref 11.5–14.5)
GLOBULIN SER CALC-MCNC: 3.6 G/DL (ref 2–4)
GLUCOSE SERPL-MCNC: 117 MG/DL (ref 65–100)
GLUCOSE UR STRIP.AUTO-MCNC: NEGATIVE MG/DL
HCT VFR BLD AUTO: 43.4 % (ref 36.6–50.3)
HGB BLD-MCNC: 15 G/DL (ref 12.1–17)
HGB UR QL STRIP: ABNORMAL
IMM GRANULOCYTES # BLD AUTO: 0 K/UL (ref 0–0.04)
IMM GRANULOCYTES NFR BLD AUTO: 0 % (ref 0–0.5)
KETONES UR QL STRIP.AUTO: NEGATIVE MG/DL
LEUKOCYTE ESTERASE UR QL STRIP.AUTO: ABNORMAL
LYMPHOCYTES # BLD: 1.7 K/UL (ref 0.8–3.5)
LYMPHOCYTES NFR BLD: 30 % (ref 12–49)
MCH RBC QN AUTO: 33.9 PG (ref 26–34)
MCHC RBC AUTO-ENTMCNC: 34.6 G/DL (ref 30–36.5)
MCV RBC AUTO: 98 FL (ref 80–99)
MONOCYTES # BLD: 0.5 K/UL (ref 0–1)
MONOCYTES NFR BLD: 8 % (ref 5–13)
NEUTS SEG # BLD: 3.3 K/UL (ref 1.8–8)
NEUTS SEG NFR BLD: 58 % (ref 32–75)
NITRITE UR QL STRIP.AUTO: NEGATIVE
NRBC # BLD: 0 K/UL (ref 0–0.01)
NRBC BLD-RTO: 0 PER 100 WBC
PH UR STRIP: 7 [PH] (ref 5–8)
PLATELET # BLD AUTO: 237 K/UL (ref 150–400)
PMV BLD AUTO: 9.8 FL (ref 8.9–12.9)
POTASSIUM SERPL-SCNC: 4.4 MMOL/L (ref 3.5–5.1)
PROT SERPL-MCNC: 7.6 G/DL (ref 6.4–8.2)
PROT UR STRIP-MCNC: NEGATIVE MG/DL
RBC # BLD AUTO: 4.43 M/UL (ref 4.1–5.7)
RBC #/AREA URNS HPF: ABNORMAL /HPF (ref 0–5)
SODIUM SERPL-SCNC: 141 MMOL/L (ref 136–145)
SP GR UR REFRACTOMETRY: 1.01 (ref 1–1.03)
UA: UC IF INDICATED,UAUC: ABNORMAL
UROBILINOGEN UR QL STRIP.AUTO: 0.2 EU/DL (ref 0.2–1)
WBC # BLD AUTO: 5.8 K/UL (ref 4.1–11.1)
WBC URNS QL MICRO: ABNORMAL /HPF (ref 0–4)

## 2021-04-03 PROCEDURE — 81001 URINALYSIS AUTO W/SCOPE: CPT

## 2021-04-03 PROCEDURE — 85025 COMPLETE CBC W/AUTO DIFF WBC: CPT

## 2021-04-03 PROCEDURE — 74011250636 HC RX REV CODE- 250/636: Performed by: EMERGENCY MEDICINE

## 2021-04-03 PROCEDURE — 96374 THER/PROPH/DIAG INJ IV PUSH: CPT

## 2021-04-03 PROCEDURE — 36415 COLL VENOUS BLD VENIPUNCTURE: CPT

## 2021-04-03 PROCEDURE — 80053 COMPREHEN METABOLIC PANEL: CPT

## 2021-04-03 PROCEDURE — 77030005529 HC CATH URETH FOL40 BARD -A

## 2021-04-03 PROCEDURE — 74011000250 HC RX REV CODE- 250: Performed by: EMERGENCY MEDICINE

## 2021-04-03 PROCEDURE — 51702 INSERT TEMP BLADDER CATH: CPT

## 2021-04-03 PROCEDURE — 99284 EMERGENCY DEPT VISIT MOD MDM: CPT

## 2021-04-03 PROCEDURE — 77030011943

## 2021-04-03 PROCEDURE — 74176 CT ABD & PELVIS W/O CONTRAST: CPT

## 2021-04-03 PROCEDURE — 96375 TX/PRO/DX INJ NEW DRUG ADDON: CPT

## 2021-04-03 PROCEDURE — 87086 URINE CULTURE/COLONY COUNT: CPT

## 2021-04-03 RX ORDER — ONDANSETRON 2 MG/ML
4 INJECTION INTRAMUSCULAR; INTRAVENOUS
Status: COMPLETED | OUTPATIENT
Start: 2021-04-03 | End: 2021-04-03

## 2021-04-03 RX ADMIN — ONDANSETRON 4 MG: 2 INJECTION INTRAMUSCULAR; INTRAVENOUS at 11:52

## 2021-04-03 RX ADMIN — SODIUM CHLORIDE 1000 ML: 9 INJECTION, SOLUTION INTRAVENOUS at 12:03

## 2021-04-03 RX ADMIN — PROMETHAZINE HYDROCHLORIDE 12.5 MG: 25 INJECTION INTRAMUSCULAR; INTRAVENOUS at 12:33

## 2021-04-03 NOTE — ED PROVIDER NOTES
[facility]  EMERGENCY DEPARTMENT HISTORY AND PHYSICAL EXAM         Date of Service: 4/3/2021   Patient Name: Javon Patterson   YOB: 1945  Medical Record Number: [id number]    History of Presenting Illness     Chief Complaint   Patient presents with    Bladder Infection        History Provided By:  patient    Additional History:   Javon Patterson is a 67 y.o. male who presents ambulatory to the ED with cc of urinary frequency, urgency, and suprapubic discomfort that began yesterday. Denies F/C, N/V, weakness, CP, SOB, flank pain, hematuria, dysuria. Pt has a prior H/O UTI and subsequent sepsis 2 years ago. There are no other complaints, changes or physical findings at this time. Primary Care Provider: Nicolasa Whiteside PA-C   Specialist:    Past History     Past Medical History:   Past Medical History:   Diagnosis Date    Bradycardia     Dyslipidemia     Elevated PSA     Inguinal hernia     right    Skin lesion of face     UTI (urinary tract infection)         Past Surgical History:   Past Surgical History:   Procedure Laterality Date    COLONOSCOPY N/A 2019    COLONOSCOPY performed by Izabel Houston [facility] at [facility] ENDOSCOPY    COLONOSCOPY,DIAGNOSTIC  2019         HX TONSILLECTOMY      HX UROLOGICAL      cysto        Family History:   Family History   Problem Relation Age of Onset    No Known Problems Mother     Alcohol abuse Father     Coronary Artery Disease Neg Hx         Social History:   Social History     Tobacco Use    Smoking status: Former Smoker     Packs/day: 1.00     Years: 5.00     Pack years: 5.00     Types: Cigarettes     Quit date:      Years since quittin.2    Smokeless tobacco: Never Used   Substance Use Topics    Alcohol use:  Yes     Alcohol/week: 3.0 - 4.0 standard drinks     Types: 3 - 4 Shots of liquor per week     Comment: 4 oz per drink 3-4 times weekly    Drug use: Never        Allergies:   No Known Allergies Review of Systems   Review of Systems   Constitutional: Negative for appetite change, chills and fever. HENT: Negative for congestion. Eyes: Negative for visual disturbance. Respiratory: Negative for cough, shortness of breath and wheezing. Cardiovascular: Negative for chest pain, palpitations and leg swelling. Gastrointestinal: Positive for abdominal pain. Negative for nausea and vomiting. Genitourinary: Positive for frequency and urgency. Negative for dysuria, flank pain and hematuria. Musculoskeletal: Negative for back pain, joint swelling, myalgias and neck stiffness. Skin: Negative for rash. Neurological: Negative for dizziness, syncope, weakness and headaches. Hematological: Negative for adenopathy. Psychiatric/Behavioral: Negative for behavioral problems and dysphoric mood. Physical Exam  Physical Exam  Vitals signs and nursing note reviewed. Constitutional:       General: He is not in acute distress. Appearance: He is well-developed. HENT:      Head: Normocephalic and atraumatic. Eyes:      General: No scleral icterus. Conjunctiva/sclera: Conjunctivae normal.      Pupils: Pupils are equal, round, and reactive to light. Neck:      Musculoskeletal: Normal range of motion and neck supple. Cardiovascular:      Rate and Rhythm: Normal rate and regular rhythm. Heart sounds: No murmur. No gallop. Pulmonary:      Effort: Pulmonary effort is normal. No respiratory distress. Breath sounds: No stridor. No wheezing or rales. Abdominal:      General: Bowel sounds are normal. There is no distension. Palpations: Abdomen is soft. There is no mass. Tenderness: There is no abdominal tenderness. There is no guarding or rebound. Comments: Moderate suprapubic TTP   Musculoskeletal: Normal range of motion. Lymphadenopathy:      Cervical: No cervical adenopathy. Skin:     General: Skin is warm and dry. Findings: No erythema or rash. Neurological:      Mental Status: He is alert and oriented to person, place, and time. Cranial Nerves: No cranial nerve deficit. Coordination: Coordination normal.         Medical Decision Making   I am the first provider for this patient. I reviewed the vital signs, available nursing notes, past medical history, past surgical history, family history and social history. Old Medical Records: PCP notes, previous ED and ED [facility] inpatient notes     Provider Notes:   DDX: UTI, pyelo, stone, interstitial cystitis     ED Course:  11:30 AM   Initial assessment performed. The patients presenting problems have been discussed, and they are in agreement with the care plan formulated and outlined with them. I have encouraged them to ask questions as they arise throughout their visit. Progress Notes:  11:48 AM  Though p's sx are C/W UTI, he now states he is having nausea (no vomiting) and has to move his bowels whenever he voids. No H/O kidney stones or diverticulitis, but new sx are suspicious. Will check labs, obtain CT abdomen/pelvis. Peter Espana MD    12:40 PM  Labs are unremarkable, with no compelling evidence of UTI. However, CT shows a distended bladder due to BPH, with bladder outlet obstruction. Will place Fin[place], reassess. Pt having N/V and had no relief from Zofran but better after Phenergan. Review of chart shows H/O BPH with TURP about a year ago. Pt's spouse states he ran out of his medication for BPH for an unknown period of time, and just resumed it a week ago. He will need to F/U with his urologist, Dr. Jayla Walter in [place]. Peter Espana MD    1:10 PM  >800 ml urine output after catheter placement. Will change over to leg bag, D/C home.   Peter Espana MD    Procedures:   Procedures    Diagnostic Study Results   Labs -      Recent Results (from the past 12 hour(s))   URINALYSIS W/ REFLEX CULTURE    Collection Time: 04/03/21 11:17 AM    Specimen: Urine   Result Value Ref Range    Color YELLOW/STRAW      Appearance CLEAR CLEAR      Specific gravity 1.010 1.003 - 1.030      pH (UA) 7.0 5.0 - 8.0      Protein Negative NEG mg/dL    Glucose Negative NEG mg/dL    Ketone Negative NEG mg/dL    Bilirubin Negative NEG      Blood TRACE (A) NEG      Urobilinogen 0.2 0.2 - 1.0 EU/dL    Nitrites Negative NEG      Leukocyte Esterase SMALL (A) NEG      WBC 0-4 0 - 4 /hpf    RBC 0-5 0 - 5 /hpf    Epithelial cells FEW FEW /lpf    Bacteria Negative NEG /hpf    UA:UC IF INDICATED CULTURE NOT INDICATED BY UA RESULT CNI     CBC WITH AUTOMATED DIFF    Collection Time: 04/03/21 12:01 PM   Result Value Ref Range    WBC 5.8 4.1 - 11.1 K/uL    RBC 4.43 4.10 - 5.70 M/uL    HGB 15.0 12.1 - 17.0 g/dL    HCT 43.4 36.6 - 50.3 %    MCV 98.0 80.0 - 99.0 FL    MCH 33.9 26.0 - 34.0 PG    MCHC 34.6 30.0 - 36.5 g/dL    RDW 12.7 11.5 - 14.5 %    PLATELET 487 783 - 136 K/uL    MPV 9.8 8.9 - 12.9 FL    NRBC 0.0 0  WBC    ABSOLUTE NRBC 0.00 0.00 - 0.01 K/uL    NEUTROPHILS 58 32 - 75 %    LYMPHOCYTES 30 12 - 49 %    MONOCYTES 8 5 - 13 %    EOSINOPHILS 3 0 - 7 %    BASOPHILS 1 0 - 1 %    IMMATURE GRANULOCYTES 0 0.0 - 0.5 %    ABS. NEUTROPHILS 3.3 1.8 - 8.0 K/UL    ABS. LYMPHOCYTES 1.7 0.8 - 3.5 K/UL    ABS. MONOCYTES 0.5 0.0 - 1.0 K/UL    ABS. EOSINOPHILS 0.2 0.0 - 0.4 K/UL    ABS. BASOPHILS 0.1 0.0 - 0.1 K/UL    ABS. IMM.  GRANS. 0.0 0.00 - 0.04 K/UL    DF AUTOMATED     METABOLIC PANEL, COMPREHENSIVE    Collection Time: 04/03/21 12:01 PM   Result Value Ref Range    Sodium 141 136 - 145 mmol/L    Potassium 4.4 3.5 - 5.1 mmol/L    Chloride 102 97 - 108 mmol/L    CO2 27 21 - 32 mmol/L    Anion gap 12 5 - 15 mmol/L    Glucose 117 (H) 65 - 100 mg/dL    BUN 14 6 - 20 MG/DL    Creatinine 1.11 0.70 - 1.30 MG/DL    BUN/Creatinine ratio 13 12 - 20      GFR est AA >60 >60 ml/min/1.73m2    GFR est non-AA >60 >60 ml/min/1.73m2    Calcium 9.1 8.5 - 10.1 MG/DL    Bilirubin, total 0.5 0.2 - 1.0 MG/DL    ALT (SGPT) 24 12 - 78 U/L AST (SGOT) 23 15 - 37 U/L    Alk. phosphatase 85 45 - 117 U/L    Protein, total 7.6 6.4 - 8.2 g/dL    Albumin 4.0 3.5 - 5.0 g/dL    Globulin 3.6 2.0 - 4.0 g/dL    A-G Ratio 1.1 1.1 - 2.2         Radiologic Studies -  The following have been ordered and reviewed:  CT ABD PELV WO CONT   Final Result   1. Distended urinary bladder and bilateral hydroureteronephrosis suggestive of   bladder outlet obstruction due to prostatic hypertrophy. 2.  No renal or ureteral stones. CT Results  (Last 48 hours)               04/03/21 1212  CT ABD PELV WO CONT Final result    Impression:  1. Distended urinary bladder and bilateral hydroureteronephrosis suggestive of   bladder outlet obstruction due to prostatic hypertrophy. 2.  No renal or ureteral stones. Narrative:  EXAM: CT ABD PELV WO CONT       INDICATION: Abdominal pain, R/O diverticulitis, ureteral stone       COMPARISON: August 2019       CONTRAST:  None. TECHNIQUE:    Thin axial images were obtained through the abdomen and pelvis. Coronal and   sagittal reformats were generated. Oral contrast was not administered. CT dose   reduction was achieved through use of a standardized protocol tailored for this   examination and automatic exposure control for dose modulation. The absence of intravenous contrast material reduces the sensitivity for   evaluation of the vasculature and solid organs. FINDINGS:    LOWER THORAX: No significant abnormality in the incidentally imaged lower chest.   LIVER: No mass. BILIARY TREE: Gallbladder is within normal limits. CBD is not dilated. SPLEEN: within normal limits. PANCREAS: No focal abnormality. ADRENALS: Unremarkable. KIDNEYS/URETERS: 22 mm simple left renal cyst. No follow-up needed. Bilateral   hydroureteronephrosis. No renal or ureteral calculi. STOMACH: Unremarkable. SMALL BOWEL: No dilatation or wall thickening. COLON: No dilatation or wall thickening.    APPENDIX: Normal PERITONEUM: No ascites or pneumoperitoneum. RETROPERITONEUM: No lymphadenopathy or aortic aneurysm. REPRODUCTIVE ORGANS: Enlarged prostate measuring 5.6 x 5.5 cm. URINARY BLADDER: Distended urinary bladder. No bladder calculi. BONES: No destructive bone lesion. ABDOMINAL WALL: No mass or hernia. ADDITIONAL COMMENTS: N/A               CXR Results  (Last 48 hours)    None            Vital Signs-Reviewed the patient's vital signs. Patient Vitals for the past 12 hrs:   Temp Pulse Resp BP SpO2   04/03/21 1111 97.9 °F (36.6 °C) 60 14 (!) 163/82 100 %       Medications Given in the ED:  Medications   sodium chloride 0.9 % bolus infusion 1,000 mL (1,000 mL IntraVENous New Bag 4/3/21 1203)   ondansetron (ZOFRAN) injection 4 mg (4 mg IntraVENous Given 4/3/21 1152)   promethazine (PHENERGAN) with saline injection 12.5 mg (12.5 mg IntraVENous Given 4/3/21 1233)       Diagnosis:  Clinical Impression:   1. Urinary retention    2. BPH with urinary obstruction         Plan:  1:   Follow-up Information     Follow up With Specialties Details Why Contact Info    Yvon Dorsey MD Urology Call in 2 days  [facility]  [phone]            2:   Current Discharge Medication List        Return to ED if worse. Disposition:  Home  _______________________________   Attestations: This note was performed by Aron Cat MD in its entirety.   _______________________________

## 2021-04-03 NOTE — ED NOTES
Urinary drainage bag removed from catheter and leg bag attached to pt's right leg; catheter continues to drain clear yellow urine.

## 2021-04-03 NOTE — ED NOTES
Discharge instructions are reviewed with patient and wife who deny questions or concerns. Demonstrated use of leg bag, how and when to empty bag, and s/s for which to seek immediate medical care. Leaves amb accomp by wife.

## 2021-04-04 LAB
BACTERIA SPEC CULT: NORMAL
SERVICE CMNT-IMP: NORMAL

## 2021-06-17 ENCOUNTER — CLINICAL SUPPORT (OUTPATIENT)
Dept: FAMILY MEDICINE CLINIC | Age: 76
End: 2021-06-17
Payer: MEDICARE

## 2021-06-17 DIAGNOSIS — R97.20 ELEVATED PROSTATE SPECIFIC ANTIGEN (PSA): Primary | ICD-10-CM

## 2021-06-17 PROCEDURE — 36415 COLL VENOUS BLD VENIPUNCTURE: CPT | Performed by: PHYSICIAN ASSISTANT

## 2021-06-18 LAB — PSA SERPL-MCNC: 3.7 NG/ML (ref 0.01–4)

## 2021-11-24 ENCOUNTER — OFFICE VISIT (OUTPATIENT)
Dept: FAMILY MEDICINE CLINIC | Age: 76
End: 2021-11-24
Payer: MEDICARE

## 2021-11-24 VITALS
RESPIRATION RATE: 16 BRPM | SYSTOLIC BLOOD PRESSURE: 112 MMHG | TEMPERATURE: 97.3 F | HEART RATE: 68 BPM | DIASTOLIC BLOOD PRESSURE: 67 MMHG | OXYGEN SATURATION: 96 % | BODY MASS INDEX: 30.53 KG/M2 | HEIGHT: 72 IN | WEIGHT: 225.4 LBS

## 2021-11-24 DIAGNOSIS — M65.4 TENOSYNOVITIS, DE QUERVAIN: ICD-10-CM

## 2021-11-24 DIAGNOSIS — Z00.00 MEDICARE ANNUAL WELLNESS VISIT, SUBSEQUENT: Primary | ICD-10-CM

## 2021-11-24 PROCEDURE — 99213 OFFICE O/P EST LOW 20 MIN: CPT | Performed by: FAMILY MEDICINE

## 2021-11-24 PROCEDURE — G0439 PPPS, SUBSEQ VISIT: HCPCS | Performed by: FAMILY MEDICINE

## 2021-11-24 PROCEDURE — 20550 NJX 1 TENDON SHEATH/LIGAMENT: CPT | Performed by: FAMILY MEDICINE

## 2021-11-24 RX ORDER — TRIAMCINOLONE ACETONIDE 40 MG/ML
40 INJECTION, SUSPENSION INTRA-ARTICULAR; INTRAMUSCULAR ONCE
Status: COMPLETED | OUTPATIENT
Start: 2021-11-24 | End: 2021-11-24

## 2021-11-24 RX ADMIN — TRIAMCINOLONE ACETONIDE 40 MG: 40 INJECTION, SUSPENSION INTRA-ARTICULAR; INTRAMUSCULAR at 14:40

## 2021-11-24 NOTE — PATIENT INSTRUCTIONS
Medicare Wellness Visit, Male    The best way to live healthy is to have a lifestyle where you eat a well-balanced diet, exercise regularly, limit alcohol use, and quit all forms of tobacco/nicotine, if applicable. Regular preventive services are another way to keep healthy. Preventive services (vaccines, screening tests, monitoring & exams) can help personalize your care plan, which helps you manage your own care. Screening tests can find health problems at the earliest stages, when they are easiest to treat. [facility] follows the current, evidence-based guidelines published by the [facility] Leander Debi ([facility]) when recommending preventive services for our patients. Because we follow these guidelines, sometimes recommendations change over time as research supports it. (For example, a prostate screening blood test is no longer routinely recommended for men with no symptoms). Of course, you and your doctor may decide to screen more often for some diseases, based on your risk and co-morbidities (chronic disease you are already diagnosed with). Preventive services for you include:  - Medicare offers their members a free annual wellness visit, which is time for you and your primary care provider to discuss and plan for your preventive service needs. Take advantage of this benefit every year!  -All adults over age 72 should receive the recommended pneumonia vaccines. Current USPSTF guidelines recommend a series of two vaccines for the best pneumonia protection.   -All adults should have a flu vaccine yearly and tetanus vaccine every 10 years.  -All adults age 48 and older should receive the shingles vaccines (series of two vaccines).        -All adults age 38-68 who are overweight should have a diabetes screening test once every three years.   -Other screening tests & preventive services for persons with diabetes include: an eye exam to screen for diabetic retinopathy, a kidney function test, a foot exam, and stricter control over your cholesterol.   -Cardiovascular screening for adults with routine risk involves an electrocardiogram (ECG) at intervals determined by the provider.   -Colorectal cancer screening should be done for adults age 54-65 with no increased risk factors for colorectal cancer. There are a number of acceptable methods of screening for this type of cancer. Each test has its own benefits and drawbacks. Discuss with your provider what is most appropriate for you during your annual wellness visit. The different tests include: colonoscopy (considered the best screening method), a fecal occult blood test, a fecal DNA test, and sigmoidoscopy.  -All adults born between [facility] should be screened once for Hepatitis C.  -An Abdominal Aortic Aneurysm (AAA) Screening is recommended for men age 73-68 who has ever smoked in their lifetime. Here is a list of your current Health Maintenance items (your personalized list of preventive services) with a due date:  Health Maintenance Due   Topic Date Due    Hepatitis C Test  Never done    Shingles Vaccine (1 of 2) Never done    COVID-19 Vaccine (3 - Booster for Reynold Banister series) 08/23/2021    Yearly Flu Vaccine (1) 09/01/2021        Myna Sham Disease: Exercises  Introduction  Here are some examples of exercises for you to try. The exercises may be suggested for a condition or for rehabilitation. Start each exercise slowly. Ease off the exercises if you start to have pain. You will be told when to start these exercises and which ones will work best for you. How to do the exercises  Thumb lifts    1. Place your hand on a flat surface, with your palm up. 2. Lift your thumb away from your palm to make a \"C\" shape. 3. Hold for about 6 seconds. 4. Repeat 8 to 12 times. Passive thumb MP flexion    1.  Hold your hand in front of you, and turn your hand so your little finger faces down and your thumb faces up. (Your hand should be in the position used for shaking someone's hand.) You may also rest your hand on a flat surface. 2. Use the fingers on your other hand to bend your thumb down at the point where your thumb connects to your palm. 3. Hold for at least 15 to 30 seconds. 4. Repeat 2 to 4 times. Finkelstein stretch    1. Hold your arms out in front of you. (Your hand should be in the position used for shaking someone's hand.)  2. Bend your thumb toward your palm. 3. Use your other hand to gently stretch your thumb and wrist downward until you feel the stretch on the thumb side of your wrist.  4. Hold for at least 15 to 30 seconds. 5. Repeat 2 to 4 times. Resisted ulnar deviation    For this exercise, you will need elastic exercise material, such as surgical tubing or Thera-Band. 1. Sit leaning forward with your legs slightly spread and your elbow on your thigh. 2. Grasp one end of the band with your palm down, and step on the other end with the foot opposite the hand holding the band. 3. Slowly bend your wrist sideways and away from your knee. 4. Repeat 8 to 12 times. Follow-up care is a key part of your treatment and safety. Be sure to make and go to all appointments, and call your doctor if you are having problems. It's also a good idea to know your test results and keep a list of the medicines you take. Where can you learn more? Go to http://www.gray.com/  Enter I827 in the search box to learn more about \"De Quervain's Disease: Exercises. \"  Current as of: July 1, 2021               Content Version: 13.0  © 3365-0630 Healthwise, Incorporated. Care instructions adapted under license by [employer] (which disclaims liability or warranty for this information).  If you have questions about a medical condition or this instruction, always ask your healthcare professional. [address] any warranty or liability for your use of this information. Kourtney Loza Tenosynovitis: Care Instructions  Your Care Instructions  Kourtney Loza (say \"Jun\") tenosynovitis is a problem that makes the bottom of your thumb and the side of your wrist hurt. When you have de Quervain's, the ropey fiber (tendon) that helps move your thumb away from your fingers becomes swollen. You may have pain when you move your wrist or pick things up. You may hear a creaking sound when you move your wrist or thumb. Symptoms often get better in a few weeks with home care. Your doctor may want you to start some gentle stretching exercises once your symptoms are gone. Sometimes treatment with an injection or surgery is needed. Follow-up care is a key part of your treatment and safety. Be sure to make and go to all appointments, and call your doctor if you are having problems. It's also a good idea to know your test results and keep a list of the medicines you take. How can you care for yourself at home? · Until your symptoms are better, stop the activities that caused the pain. · Avoid moving the hand and wrist that hurt. · Follow your doctor's directions for wearing a splint to keep your thumb and wrist from moving. · Try ice or heat. ? Put ice or a cold pack on your thumb and wrist for 10 to 20 minutes at a time. Put a thin cloth between the ice and your skin. ? You can use heat for 20 to 30 minutes, 2 or 3 times a day. Try using a heating pad, hot shower, or hot pack. · Ask your doctor if you can take an over-the-counter pain medicine, such as acetaminophen (Tylenol), ibuprofen (Advil, Motrin), or naproxen (Aleve). Be safe with medicines. Read and follow all instructions on the label. When should you call for help?   Watch closely for changes in your health, and be sure to contact your doctor if:    · You have new or worse pain.     · You have new or worse numbness or tingling in your hand or fingers.     · Your hand feels weaker.     · You do not get better as expected. Where can you learn more? Go to http://www.gray.com/  Enter [id number] in the search box to learn more about \"De Quervain's Tenosynovitis: Care Instructions. \"  Current as of: July 1, 2021               Content Version: 13.0  © 8200-3628 Healthwise, Incorporated. Care instructions adapted under license by [employer] (which disclaims liability or warranty for this information). If you have questions about a medical condition or this instruction, always ask your healthcare professional. [address] any warranty or liability for your use of this information.

## 2021-11-24 NOTE — PROGRESS NOTES
1. Have you been to the ER, urgent care clinic since your last visit? Hospitalized since your last visit? No    2. Have you seen or consulted any other health care providers outside of the [address] [place] [address] since your last visit? Include any pap smears or colon screening. No     This is the Subsequent Medicare Annual Wellness Exam, performed 12 months or more after the Initial AWV or the last Subsequent AWV    I have reviewed the patient's medical history in detail and updated the computerized patient record. Assessment/Plan   Education and counseling provided:  Are appropriate based on today's review and evaluation    1. Medicare annual wellness visit, subsequent  2. Tenosynovitis, de Quervain  -     [place] INJECT TENDON SHEATH/LIGAMENT  -     triamcinolone acetonide (KENALOG-40) 40 mg/mL injection 40 mg; 40 mg, Intra artICUlar, ONCE, 1 dose, On Wed 11/24/21 at 1500       Depression Risk Factor Screening     3 most recent PHQ Screens 11/24/2021   Little interest or pleasure in doing things Not at all   Feeling down, depressed, irritable, or hopeless Not at all   Total Score PHQ 2 0       Alcohol Risk Screen    Do you average more than 1 drink per night or more than 7 drinks a week: Yes    In the past three months have you have had more than 4 drinks containing alcohol on one occasion: No        Functional Ability and Level of Safety    Hearing: Hearing is good. The patient wears hearing aids. Activities of Daily Living: The home contains: handrails and grab bars  Patient does total self care      Ambulation: with no difficulty     Fall Risk:  Fall Risk Assessment, last 12 mths 8/22/2019   Able to walk? Yes   Fall in past 12 months?  No      Abuse Screen:  Patient is not abused       Cognitive Screening    Has your family/caregiver stated any concerns about your memory: no     Cognitive Screening: N/A    Health Maintenance Due     Health Maintenance Due   Topic Date Due    Hepatitis C Screening Never done    Shingrix Vaccine Age 50> (1 of 2) Never done    COVID-19 Vaccine (3 - Booster for Moderna series) 2021    Flu Vaccine (1) 2021       Patient Care Team   Patient Care Team:  Olga Akins as PCP - General (Physician Assistant)  Richi Foster PA-C as PCP - [address] [place] [address] [facility] Provider  Billie Chavez MD (Cardiology)    History     Patient Active Problem List   Diagnosis Code    Advance directive discussed with patient Z71.89    Dyslipidemia E78.5    Bradycardia R00.1    Precordial pain R07.2    Atrial arrhythmia I49.8    UTI (urinary tract infection) N39.0    CASSIE (acute kidney injury) ([facility] [address] N17.9    Septic shock ([facility] [address] A41.9, R65.21     Past Medical History:   Diagnosis Date    Bradycardia     Dyslipidemia     Elevated PSA     Inguinal hernia     right    Skin lesion of face     UTI (urinary tract infection)       Past Surgical History:   Procedure Laterality Date    COLONOSCOPY N/A 2019    COLONOSCOPY performed by Isabella Swain MD at [facility]"" ENDOSCOPY    COLONOSCOPY,DIAGNOSTIC  2019         HX TONSILLECTOMY      HX UROLOGICAL      cysto     Current Outpatient Medications   Medication Sig Dispense Refill    fesoterodine (Toviaz) 4 mg SR tablet Take 4 mg by mouth daily. Current Facility-Administered Medications   Medication Dose Route Frequency Provider Last Rate Last Admin    triamcinolone acetonide (KENALOG-40) 40 mg/mL injection 40 mg  40 mg Intra artICUlar ONCE Alicia Rodriguez MD         No Known Allergies    Family History   Problem Relation Age of Onset    No Known Problems Mother     Alcohol abuse Father     Coronary Art Dis Neg Hx      Social History     Tobacco Use    Smoking status: Former Smoker     Packs/day: 1.00     Years: 5.00     Pack years: 5.00     Types: Cigarettes     Quit date: 5     Years since quittin.5    Smokeless tobacco: Never Used   Substance Use Topics    Alcohol use:  Yes     Alcohol/week: 3.0 - 4.0 standard drinks     Types: 3 - 4 Shots of liquor per week     Comment: 4 oz per drink 3-4 times weekly       Functional Ability:   Does the patient exhibit a steady gait? yes   How long did it take the patient to get up and walk from a sitting position? 2 seconds   Is the patient self reliant?  (ie can do own laundry, meals, household chores)  yes     Does the patient handle his/her own medications? yes     Does the patient handle his/her own money? yes     Is the patients home safe (ie good lighting, handrails on stairs and bath, etc.)? yes     Did you notice or did patient express any hearing difficulties? no     Did you notice or did patient express any vision difficulties? yes     Were distance and reading eye charts used? no       Advance Care Planning:   Patient was offered the opportunity to discuss advance care planning:  yes     Does patient have an Advance Directive:  yes   If no, did you provide information on Caring Connections? no     ADL Assessment 8/22/2019   Feeding yourself No Help Needed   Getting from bed to chair No Help Needed   Getting dressed No Help Needed   Bathing or showering No Help Needed   Walk across the room (includes cane/walker) No Help Needed   Using the telphone No Help Needed   Taking your medications No Help Needed   Preparing meals No Help Needed   Managing money (expenses/bills) No Help Needed   Moderately strenuous housework (laundry) No Help Needed   Shopping for personal items (toiletries/medicines) No Help Needed   Shopping for groceries No Help Needed   Driving No Help Needed   Climbing a flight of stairs No Help Needed   Getting to places beyond walking distances No Help Needed       Abuse Screening Questionnaire 10/1/2019   Do you ever feel afraid of your partner? N   Are you in a relationship with someone who physically or mentally threatens you? N   Is it safe for you to go home?  Jaxon Guzman RN

## 2021-11-24 NOTE — PROGRESS NOTES
11/24/2021      Chief Complaint   Patient presents with    Wrist Pain     left wrist    Annual Wellness Visit              History of Present Illness:         is a 67 y.o. male with one month of pain of the L wrist with extension of thumb and ulnar deviation of wrist. Has not tried any treatment. No Known Allergies    Current Outpatient Medications   Medication Sig    fesoterodine (Toviaz) 4 mg SR tablet Take 4 mg by mouth daily. Current Facility-Administered Medications   Medication    triamcinolone acetonide (KENALOG-40) 40 mg/mL injection 40 mg           Physical Examination:    Visit Vitals  /67 (BP 1 Location: Left upper arm, BP Patient Position: Sitting, BP Cuff Size: Large adult)   Pulse 68   Temp 97.3 °F (36.3 °C) (Temporal)   Resp 16   Ht 6' (1.829 m)   Wt 225 lb 6.4 oz (102.2 kg)   SpO2 96%   BMI 30.57 kg/m²      General:  Alert, cooperative, no distress. HEENT:  Normocephalic, without obvious abnormality, atraumatic. Conjunctivae/corneas clear. Pupils equal, round, reactive to light. Extraocular movements intact. TMs and external canals normal bilaterally. Nasal mucosa and oropharynx clear. Lungs: Clear to auscultation bilaterally. Chest wall:  No tenderness or deformity. Heart:  Regular rate and rhythm, S1, S2 normal, no murmur, click, rub, or gallop. Abdomen:   Soft, non-tender. Bowel sounds normal. No masses. No organomegaly. Extremities: Extremities normal, atraumatic, no cyanosis or edema. Pain at extensor hallicus longus tendon with opposed extension of thumb and hyper ulnar deviation of L wrist   Pulses: 2+ and symmetric all extremities. Skin: Skin color, texture, turgor normal. No rashes or lesions. Lymph nodes: Cervical, supraclavicular, and axillary nodes normal.   Neurologic: CNII-XII intact. Normal strength, sensation, and reflexes throughout. ASSESSMENT AND PLAN    1. Medicare annual wellness visit, subsequent      2.  Tenosynovitis, de Quervain  fol informed consent and sterile prep, L extensor H. Longus sheath injected.  Patient information provided  - [place] INJECT TENDON SHEATH/LIGAMENT  - triamcinolone acetonide (KENALOG-40) 40 mg/mL injection 40 mg          Orders Placed This Encounter    62303 - INJECT TENDON SHEATH/LIGAMENT    triamcinolone acetonide (KENALOG-40) 40 mg/mL injection 40 mg           Cinthya Santiago MD

## 2021-11-24 NOTE — ACP (ADVANCE CARE PLANNING)
Non-Provider Advance Care Planning (ACP) Note    Date of ACP Conversation: 11/24/2021  Persons included in Conversation: patient  Length of ACP Conversation in minutes: <16 minutes (Non-Billable)    Conversation requested by:   Provider    Authorized Decision Maker (if patient is incapable of making informed decisions): This person is:  Named in Advance Directive or [address] [facility] [address] for ALL Patients with Decision Making Capacity:    Advance Directive Conversation with Patients who have not yet planned:  N/A    Review of Existing Advance Directive: (Select questions covered)  Does this advance directive still reflect your preferences?   Yes    Interventions Provided:  N/A

## 2021-11-24 NOTE — PROGRESS NOTES
[address]  OFFICE PROCEDURE PROGRESS NOTE        Chart reviewed for the following:   Eduardo BUTTERFIELD RN, have reviewed the History, Physical and updated the Allergic reactions for [address] performed immediately prior to start of procedure:   Eduardo BUTTERFIELD RN, have performed the following reviews on Dina Argueta prior to the start of the procedure:            * Patient was identified by name and date of birth   * Agreement on procedure being performed was verified  * Risks and Benefits explained to the patient by Eileen Carlin MD  * Procedure site verified and marked as necessary  * Patient was positioned for comfort  * Consent was signed and verified     Time: 2:14      Date of procedure: 11/24/2021    Procedure performed by:  Eileen Carlin MD    Provider assisted by: Pinky Max RN    Patient assisted by: self    Pre Procedural Pain Scale: 0/0     Procedure start time:  2:15    Procedure end time:  2:15    How tolerated by patient: tolerated the procedure well with no complications    Post Procedural Pain Scale: 0 - No Hurt    Comments: none    Post op instructions and patient education reviewed. Patient states understanding.     Copy of discharge instructions given to patient in AVS.

## 2022-01-19 ENCOUNTER — NURSE TRIAGE (OUTPATIENT)
Dept: OTHER | Facility: CLINIC | Age: 77
End: 2022-01-19

## 2022-01-19 ENCOUNTER — OFFICE VISIT (OUTPATIENT)
Dept: FAMILY MEDICINE CLINIC | Age: 77
End: 2022-01-19
Payer: MEDICARE

## 2022-01-19 VITALS
OXYGEN SATURATION: 96 % | HEART RATE: 54 BPM | WEIGHT: 224.4 LBS | RESPIRATION RATE: 16 BRPM | SYSTOLIC BLOOD PRESSURE: 112 MMHG | TEMPERATURE: 97.3 F | HEIGHT: 72 IN | BODY MASS INDEX: 30.4 KG/M2 | DIASTOLIC BLOOD PRESSURE: 64 MMHG

## 2022-01-19 DIAGNOSIS — N50.89 TESTICULAR SWELLING, LEFT: Primary | ICD-10-CM

## 2022-01-19 DIAGNOSIS — N45.1 EPIDIDYMITIS: ICD-10-CM

## 2022-01-19 DIAGNOSIS — N50.89 SCROTAL MASS: ICD-10-CM

## 2022-01-19 DIAGNOSIS — N39.0 URINARY TRACT INFECTION WITHOUT HEMATURIA, SITE UNSPECIFIED: ICD-10-CM

## 2022-01-19 DIAGNOSIS — R30.0 DYSURIA: ICD-10-CM

## 2022-01-19 LAB
BILIRUB UR QL STRIP: NEGATIVE
GLUCOSE UR-MCNC: NEGATIVE MG/DL
KETONES P FAST UR STRIP-MCNC: NEGATIVE MG/DL
PH UR STRIP: 6 [PH] (ref 4.6–8)
PROT UR QL STRIP: NORMAL
SP GR UR STRIP: 1.02 (ref 1–1.03)
UA UROBILINOGEN AMB POC: NORMAL (ref 0.2–1)
URINALYSIS CLARITY POC: NORMAL
URINALYSIS COLOR POC: YELLOW
URINE BLOOD POC: NORMAL
URINE LEUKOCYTES POC: NORMAL
URINE NITRITES POC: POSITIVE

## 2022-01-19 PROCEDURE — 96372 THER/PROPH/DIAG INJ SC/IM: CPT | Performed by: PHYSICIAN ASSISTANT

## 2022-01-19 PROCEDURE — 99213 OFFICE O/P EST LOW 20 MIN: CPT | Performed by: PHYSICIAN ASSISTANT

## 2022-01-19 PROCEDURE — 81002 URINALYSIS NONAUTO W/O SCOPE: CPT | Performed by: PHYSICIAN ASSISTANT

## 2022-01-19 RX ORDER — DOXYCYCLINE 100 MG/1
100 TABLET ORAL 2 TIMES DAILY
Qty: 20 TABLET | Refills: 0 | Status: SHIPPED | OUTPATIENT
Start: 2022-01-19 | End: 2022-01-29

## 2022-01-19 RX ORDER — CEFTRIAXONE 500 MG/1
500 INJECTION, POWDER, FOR SOLUTION INTRAMUSCULAR; INTRAVENOUS EVERY 24 HOURS
Status: DISCONTINUED | OUTPATIENT
Start: 2022-01-19 | End: 2022-06-29

## 2022-01-19 RX ADMIN — CEFTRIAXONE 500 MG: 500 INJECTION, POWDER, FOR SOLUTION INTRAMUSCULAR; INTRAVENOUS at 10:58

## 2022-01-19 NOTE — PROGRESS NOTES
1. Have you been to the ER, urgent care clinic since your last visit? Hospitalized since your last visit? No    2. Have you seen or consulted any other health care providers outside of the [address] [place] [address] since your last visit? Include any pap smears or colon screening.  No

## 2022-01-19 NOTE — TELEPHONE ENCOUNTER
Received call from Yane Rico at [facility] with Red Flag Complaint. Subjective: Caller states \"woke up this morning and eft testicle is swollen 3x larger than right and having some pain with urinating. Has dry mouth and stomach is slightly upset\"     Current Symptoms: see above    Onset: noticed some change for past 6 weeks ; much worse this morning    Associated Symptoms: NA    Pain Severity: denies pain. Says he is just aware that it is there and testicle is warm to touch    Temperature: denies fever - 98.9 last night    What has been tried: nothing    LMP: NA Pregnant: NA    Recommended disposition: see in office within 3 days    Care advice provided, patient verbalizes understanding; denies any other questions or concerns; instructed to call back for any new or worsening symptoms. Writer provided warm transfer to Barrington Aburto at [facility] for appointment scheduling    Attention Provider: Thank you for allowing me to participate in the care of your patient. The patient was connected to triage in response to information provided to the ECC. Please do not respond through this encounter as the response is not directed to a shared pool.       Reason for Disposition   Scrotum swelling and no pain    Protocols used: SCROTUM SWELLING-ADULT-[place]

## 2022-01-19 NOTE — PROGRESS NOTES
Trenton Ponce is a 67 y.o. male who presents to the office today with the following:  Chief Complaint   Patient presents with    Testicle Swelling     going on for about a month, not painful but he can tell it is there       HPI   Pt has noticed left testicular swelling for about 1.5 months now. He says it does not hurt, but has gradually increased in size and can feel the heaviness which is uncomfortable. Is tender when touched but otherwise not very painful. Some irritation when he urinates. Some burning with urination. No blood/odor/discoloration to urine, abd pain, unintentional wt loss, f/c. No other symptoms otherwise. Was a bit nauseated this am only. Is not sexually active. Review of Systems   Constitutional: Negative for chills, fever and weight loss. Genitourinary: Positive for dysuria. Negative for flank pain, frequency, hematuria and urgency. See HPI. Past Medical History:   Diagnosis Date    Bradycardia     Dyslipidemia     Elevated PSA     Inguinal hernia     right    Skin lesion of face     UTI (urinary tract infection)        Past Surgical History:   Procedure Laterality Date    COLONOSCOPY N/A 2019    COL[facility]COPY performed by Cheng Fleming MD at [facility] ENDOSCOPY    COLONOSCOPY,DIAGNOSTIC  2019         HX TONSILLECTOMY      HX UROLOGICAL      cysto       No Known Allergies    Current Outpatient Medications   Medication Sig    doxycycline (ADOXA) 100 mg tablet Take 1 Tablet by mouth two (2) times a day for 10 days.  fesoterodine (Toviaz) 4 mg SR tablet Take 8 mg by mouth daily.      Current Facility-Administered Medications   Medication    cefTRIAXone (ROCEPHIN) injection 500 mg       Social History     Socioeconomic History    Marital status:    Tobacco Use    Smoking status: Former Smoker     Packs/day: 1.00     Years: 5.00     Pack years: 5.00     Types: Cigarettes     Quit date:      Years since quittin.0    Smokeless tobacco: Never Used   Substance and Sexual Activity    Alcohol use: Yes     Alcohol/week: 3.0 - 4.0 standard drinks     Types: 3 - 4 Shots of liquor per week     Comment: 4 oz per drink 3-4 times weekly    Drug use: Never    Sexual activity: Yes       Family History   Problem Relation Age of Onset    No Known Problems Mother     Alcohol abuse Father     Coronary Art Dis Neg Hx          Physical Exam:  Visit Vitals  /64 (BP 1 Location: Right arm, BP Patient Position: Sitting, BP Cuff Size: Adult)   Pulse (!) 54   Temp 97.3 °F (36.3 °C) (Temporal)   Resp 16   Ht 6' (1.829 m)   Wt 224 lb 6.4 oz (101.8 kg)   SpO2 96%   BMI 30.43 kg/m²     Physical Exam  Vitals and nursing note reviewed. Exam conducted with a chaperone present. Constitutional:       Appearance: Normal appearance. HENT:      Head: Normocephalic and atraumatic. Cardiovascular:      Rate and Rhythm: Normal rate and regular rhythm. Heart sounds: Normal heart sounds. Pulmonary:      Effort: Pulmonary effort is normal.      Breath sounds: Normal breath sounds. Abdominal:      Palpations: Abdomen is soft. Tenderness: There is no abdominal tenderness. Genitourinary:     Testes:         Right: Mass, tenderness, swelling, testicular hydrocele or varicocele not present. Right testis is descended. Cremasteric reflex is present. Left: Mass, tenderness and swelling present. Testicular hydrocele or varicocele not present. Left testis is descended. Cremasteric reflex is present. Epididymis:      Right: Not inflamed or enlarged. No mass or tenderness. Left: Inflamed and enlarged. Mass and tenderness present. Comments: Left testicle with large firm mass behind/top of left testicle, very tender, but no erythematous. Musculoskeletal:      Cervical back: Normal range of motion. Skin:     General: Skin is warm and dry. Neurological:      Mental Status: He is alert and oriented to person, place, and time.       Gait: Gait is intact. Psychiatric:         Mood and Affect: Mood and affect normal.         Assessment/Plan:    ICD-10-CM ICD-9-CM    1. Testicular swelling, left  N50.89 608.86 cefTRIAXone (ROCEPHIN) injection 500 mg      doxycycline (ADOXA) 100 mg tablet      CULTURE, URINE      CULTURE, URINE      US SCROTUM/TESTICLES   2. Dysuria  R30.0 788.1 AMB POC URINALYSIS DIP STICK MANUAL W/O MICRO      doxycycline (ADOXA) 100 mg tablet      CULTURE, URINE      CULTURE, URINE   3. Urinary tract infection without hematuria, site unspecified  N39.0 599.0 cefTRIAXone (ROCEPHIN) injection 500 mg      doxycycline (ADOXA) 100 mg tablet      CULTURE, URINE      CULTURE, URINE   4. Epididymitis  N45.1 604.90 US SCROTUM/TESTICLES   5. Scrotal mass  N50.89 608.89 US SCROTUM/TESTICLES     Results for orders placed or performed in visit on 01/19/22   AMB POC URINALYSIS DIP STICK MANUAL W/O MICRO   Result Value Ref Range    Color (UA POC) Yellow     Clarity (UA POC) Cloudy     Glucose (UA POC) Negative Negative    Bilirubin (UA POC) Negative Negative    Ketones (UA POC) Negative Negative    Specific gravity (UA POC) 1.025 1.001 - 1.035    Blood (UA POC) 2+ Negative    pH (UA POC) 6.0 4.6 - 8.0    Protein (UA POC) 2+ Negative    Urobilinogen (UA POC) 0.2 mg/dL 0.2 - 1    Nitrites (UA POC) Positive Negative    Leukocyte esterase (UA POC) 3+ Negative   pt tolerated inj w/o difficulty or rxn. US pending. Discussed potential etiologies. Covering for infection. Will notify with other results with any further   D/w CP who is in agreement. Seek immediate care in interim for any new sxs or other concerns. To ER for severe sxs. Pt verbalizes understanding and agrees with the plan.     Urban Ramirez PA-C

## 2022-01-21 ENCOUNTER — HOSPITAL ENCOUNTER (OUTPATIENT)
Dept: ULTRASOUND IMAGING | Age: 77
Discharge: HOME | End: 2022-01-21
Attending: PHYSICIAN ASSISTANT
Payer: MEDICARE

## 2022-01-21 DIAGNOSIS — N50.89 TESTICULAR SWELLING, LEFT: ICD-10-CM

## 2022-01-21 DIAGNOSIS — N50.89 SCROTAL MASS: ICD-10-CM

## 2022-01-21 DIAGNOSIS — N45.1 EPIDIDYMITIS: ICD-10-CM

## 2022-01-21 PROCEDURE — 76870 US EXAM SCROTUM: CPT

## 2022-01-21 NOTE — PROGRESS NOTES
Attempted to notify pt via phone, but no answer and unable to leave call bck as working from home on [employer]. Please try pt again today and see if he is feeling better. The US shows several different findings, including the suspected epididymitis, which should improve on abx. Findings overall appear benign, one is called a hydrocele which is fluid filled around the lining of the testicle and also varicocele which consists of enlarged vessels (similar to varicose veins in legs but in testicles). We can refer to Urology if symptoms are still bothersome after treatment, but epididymitis is usually better with abx, the other two you only need to do something about if they are bothersome, so why I would like to d/w pt to see how he is feeling and what he would like to do moving fwd.

## 2022-01-22 LAB
BACTERIA SPEC CULT: ABNORMAL
CC UR VC: ABNORMAL
SERVICE CMNT-IMP: ABNORMAL

## 2022-01-24 NOTE — PROGRESS NOTES
Spoke to patient after verifying two identifiers regarding US results. States he is feeling better. The swelling has gone down and he is no longer in pain. Informed to let us know if the symptoms become worse and a referral can be issued. Acknowledged understanding.

## 2022-03-19 PROBLEM — N39.0 UTI (URINARY TRACT INFECTION): Status: ACTIVE | Noted: 2019-08-15

## 2022-03-19 PROBLEM — R07.2 PRECORDIAL PAIN: Status: ACTIVE | Noted: 2017-09-27

## 2022-03-19 PROBLEM — R65.21 SEPTIC SHOCK (HCC): Status: ACTIVE | Noted: 2019-08-15

## 2022-03-19 PROBLEM — I49.8 ATRIAL ARRHYTHMIA: Status: ACTIVE | Noted: 2017-10-12

## 2022-03-19 PROBLEM — N17.9 AKI (ACUTE KIDNEY INJURY) (HCC): Status: ACTIVE | Noted: 2019-08-15

## 2022-03-19 PROBLEM — A41.9 SEPTIC SHOCK (HCC): Status: ACTIVE | Noted: 2019-08-15

## 2022-04-22 ENCOUNTER — LAB ONLY (OUTPATIENT)
Dept: FAMILY MEDICINE CLINIC | Age: 77
End: 2022-04-22
Payer: MEDICARE

## 2022-04-22 DIAGNOSIS — K62.5 HEMORRHAGE OF RECTUM AND ANUS: Primary | ICD-10-CM

## 2022-04-22 PROCEDURE — 36415 COLL VENOUS BLD VENIPUNCTURE: CPT | Performed by: PHYSICIAN ASSISTANT

## 2022-04-22 NOTE — PROGRESS NOTES
Patient presents for lab draw ordered by:    Ordering Provider:  Gloria Sanders  Ordering Department/Practice:  [facility]. Gastronenterology  Phone:  [phone]  Fax:  [phone]  Date Ordered:  04/21/2022    The following labs were drawn and sent to [facility] by Alda Johnson RN:    CBC, CMP and Iron and TIBC, and Ferritin    The following tubes were sent:    Gold  ( 2) and Lavender  ( 1)      Successful venipuncture in patient's Left AC X 1 sticks. The patient tolerated this procedure w/o c/o pain or discomfort.

## 2022-04-23 LAB
ALBUMIN SERPL-MCNC: 3.8 G/DL (ref 3.5–5)
ALBUMIN/GLOB SERPL: 1.2 {RATIO} (ref 1.1–2.2)
ALP SERPL-CCNC: 78 U/L (ref 45–117)
ALT SERPL-CCNC: 18 U/L (ref 12–78)
ANION GAP SERPL CALC-SCNC: 5 MMOL/L (ref 5–15)
AST SERPL-CCNC: 12 U/L (ref 15–37)
BASOPHILS # BLD: 0.1 K/UL (ref 0–0.1)
BASOPHILS NFR BLD: 2 % (ref 0–1)
BILIRUB SERPL-MCNC: 0.5 MG/DL (ref 0.2–1)
BUN SERPL-MCNC: 12 MG/DL (ref 6–20)
BUN/CREAT SERPL: 12 (ref 12–20)
CALCIUM SERPL-MCNC: 8.7 MG/DL (ref 8.5–10.1)
CHLORIDE SERPL-SCNC: 108 MMOL/L (ref 97–108)
CO2 SERPL-SCNC: 28 MMOL/L (ref 21–32)
CREAT SERPL-MCNC: 0.97 MG/DL (ref 0.7–1.3)
DIFFERENTIAL METHOD BLD: ABNORMAL
EOSINOPHIL # BLD: 0.1 K/UL (ref 0–0.4)
EOSINOPHIL NFR BLD: 3 % (ref 0–7)
ERYTHROCYTE [DISTWIDTH] IN BLOOD BY AUTOMATED COUNT: 14.4 % (ref 11.5–14.5)
FERRITIN SERPL-MCNC: 120 NG/ML (ref 26–388)
GLOBULIN SER CALC-MCNC: 3.2 G/DL (ref 2–4)
GLUCOSE SERPL-MCNC: 99 MG/DL (ref 65–100)
HCT VFR BLD AUTO: 39.9 % (ref 36.6–50.3)
HGB BLD-MCNC: 12.5 G/DL (ref 12.1–17)
IMM GRANULOCYTES # BLD AUTO: 0 K/UL (ref 0–0.04)
IMM GRANULOCYTES NFR BLD AUTO: 0 % (ref 0–0.5)
IRON SATN MFR SERPL: 21 % (ref 20–50)
IRON SERPL-MCNC: 77 UG/DL (ref 35–150)
LYMPHOCYTES # BLD: 1.6 K/UL (ref 0.8–3.5)
LYMPHOCYTES NFR BLD: 41 % (ref 12–49)
MCH RBC QN AUTO: 33.3 PG (ref 26–34)
MCHC RBC AUTO-ENTMCNC: 31.3 G/DL (ref 30–36.5)
MCV RBC AUTO: 106.4 FL (ref 80–99)
MONOCYTES # BLD: 0.4 K/UL (ref 0–1)
MONOCYTES NFR BLD: 12 % (ref 5–13)
NEUTS SEG # BLD: 1.7 K/UL (ref 1.8–8)
NEUTS SEG NFR BLD: 42 % (ref 32–75)
NRBC # BLD: 0 K/UL (ref 0–0.01)
NRBC BLD-RTO: 0 PER 100 WBC
PLATELET # BLD AUTO: 230 K/UL (ref 150–400)
PMV BLD AUTO: 10.1 FL (ref 8.9–12.9)
POTASSIUM SERPL-SCNC: 4.7 MMOL/L (ref 3.5–5.1)
PROT SERPL-MCNC: 7 G/DL (ref 6.4–8.2)
RBC # BLD AUTO: 3.75 M/UL (ref 4.1–5.7)
SODIUM SERPL-SCNC: 141 MMOL/L (ref 136–145)
TIBC SERPL-MCNC: 362 UG/DL (ref 250–450)
WBC # BLD AUTO: 3.8 K/UL (ref 4.1–11.1)

## 2022-06-23 ENCOUNTER — LAB ONLY (OUTPATIENT)
Dept: FAMILY MEDICINE CLINIC | Age: 77
End: 2022-06-23
Payer: MEDICARE

## 2022-06-23 DIAGNOSIS — N40.1 ENLARGED PROSTATE WITH URINARY OBSTRUCTION: Primary | ICD-10-CM

## 2022-06-23 DIAGNOSIS — N13.8 ENLARGED PROSTATE WITH URINARY OBSTRUCTION: Primary | ICD-10-CM

## 2022-06-23 LAB — PSA SERPL-MCNC: 4.4 NG/ML (ref 0.01–4)

## 2022-06-23 PROCEDURE — 36415 COLL VENOUS BLD VENIPUNCTURE: CPT | Performed by: FAMILY MEDICINE

## 2022-06-24 ENCOUNTER — TELEPHONE (OUTPATIENT)
Dept: FAMILY MEDICINE CLINIC | Age: 77
End: 2022-06-24

## 2022-06-24 NOTE — TELEPHONE ENCOUNTER
Per orders from [facility] Dr Alla Gifford, I have faxed the PSA results from yesterdays blood draw.

## 2022-06-29 ENCOUNTER — OFFICE VISIT (OUTPATIENT)
Dept: FAMILY MEDICINE CLINIC | Age: 77
End: 2022-06-29
Payer: MEDICARE

## 2022-06-29 VITALS
HEART RATE: 46 BPM | RESPIRATION RATE: 18 BRPM | SYSTOLIC BLOOD PRESSURE: 118 MMHG | OXYGEN SATURATION: 98 % | DIASTOLIC BLOOD PRESSURE: 70 MMHG | HEIGHT: 72 IN | BODY MASS INDEX: 29.53 KG/M2 | WEIGHT: 218 LBS | TEMPERATURE: 97 F

## 2022-06-29 DIAGNOSIS — Z11.59 NEED FOR HEPATITIS C SCREENING TEST: ICD-10-CM

## 2022-06-29 DIAGNOSIS — E78.5 DYSLIPIDEMIA: Primary | ICD-10-CM

## 2022-06-29 PROCEDURE — G8536 NO DOC ELDER MAL SCRN: HCPCS | Performed by: PHYSICIAN ASSISTANT

## 2022-06-29 PROCEDURE — 99213 OFFICE O/P EST LOW 20 MIN: CPT | Performed by: PHYSICIAN ASSISTANT

## 2022-06-29 PROCEDURE — 1101F PT FALLS ASSESS-DOCD LE1/YR: CPT | Performed by: PHYSICIAN ASSISTANT

## 2022-06-29 PROCEDURE — G8427 DOCREV CUR MEDS BY ELIG CLIN: HCPCS | Performed by: PHYSICIAN ASSISTANT

## 2022-06-29 PROCEDURE — G8432 DEP SCR NOT DOC, RNG: HCPCS | Performed by: PHYSICIAN ASSISTANT

## 2022-06-29 PROCEDURE — G8417 CALC BMI ABV UP PARAM F/U: HCPCS | Performed by: PHYSICIAN ASSISTANT

## 2022-06-29 PROCEDURE — 1123F ACP DISCUSS/DSCN MKR DOCD: CPT | Performed by: PHYSICIAN ASSISTANT

## 2022-06-29 RX ORDER — TAMSULOSIN HYDROCHLORIDE 0.4 MG/1
CAPSULE ORAL
COMMUNITY
Start: 2022-06-15

## 2022-06-29 NOTE — PROGRESS NOTES
Thom Recio is a 67 y.o. male who presents to the office today with the following:  Chief Complaint   Patient presents with    Follow-up    Cholesterol Problem       HPI  Is here for routine screening for his cholesterol. He is fasting. Lab Results   Component Value Date/Time    Cholesterol, total 303 (H) 2017 12:06 PM    HDL Cholesterol 36 (L) 2017 12:06 PM    LDL, calculated 221 (H) 2017 12:06 PM    VLDL, calculated 46 (H) 2017 12:06 PM    Triglyceride 228 (H) 2017 12:06 PM   is not currently not taking anything for cholesterol. Is open to treatment if recommended. Pt otherwise reports feeling well with no other complaints or acute concerns. Agrees to hep c screening. ROS  See HPI. Past Medical History:   Diagnosis Date    Bradycardia     Dyslipidemia     Elevated PSA     Inguinal hernia     right    Skin lesion of face     UTI (urinary tract infection)        Past Surgical History:   Procedure Laterality Date    COLONOSCOPY N/A 2019    COLONOSCOPY performed by [facility]ikol Diaz MD at [facility] ENDOSCOPY    COLONOSCOPY,DIAGNOSTIC  2019         HX TONSILLECTOMY      HX UROLOGICAL      cysto       No Known Allergies    Current Outpatient Medications   Medication Sig    tamsulosin (FLOMAX) 0.4 mg capsule      Current Facility-Administered Medications   Medication    cefTRIAXone (ROCEPHIN) injection 500 mg       Social History     Socioeconomic History    Marital status:    Tobacco Use    Smoking status: Former Smoker     Packs/day: 1.00     Years: 5.00     Pack years: 5.00     Types: Cigarettes     Quit date:      Years since quittin.5    Smokeless tobacco: Never Used   Substance and Sexual Activity    Alcohol use:  Yes     Alcohol/week: 3.0 - 4.0 standard drinks     Types: 3 - 4 Shots of liquor per week     Comment: 4 oz per drink 3-4 times weekly    Drug use: Never    Sexual activity: Yes       Family History   Problem Relation Age of Onset    No Known Problems Mother     Alcohol abuse Father     Coronary Art Dis Neg Hx          Physical Exam:  Visit Vitals  /70 (BP 1 Location: Left upper arm, BP Patient Position: Sitting, BP Cuff Size: Adult)   Pulse (!) 46   Temp 97 °F (36.1 °C) (Temporal)   Resp 18   Ht 6' (1.829 m)   Wt 218 lb (98.9 kg)   SpO2 98%   BMI 29.57 kg/m²   has been to cardiology for eval of low HR. Work-up fine. Annual visits. Has not been since Dr. He Bahena retired. Physical Exam  Vitals and nursing note reviewed. Constitutional:       Appearance: Normal appearance. HENT:      Head: Normocephalic and atraumatic. Eyes:      Conjunctiva/sclera: Conjunctivae normal.   Cardiovascular:      Rate and Rhythm: Normal rate and regular rhythm. Heart sounds: Normal heart sounds. Pulmonary:      Effort: Pulmonary effort is normal.      Breath sounds: Normal breath sounds. Musculoskeletal:         General: No swelling. Cervical back: Normal range of motion. Skin:     General: Skin is warm and dry. Neurological:      Mental Status: He is alert and oriented to person, place, and time. Gait: Gait is intact. Psychiatric:         Mood and Affect: Mood and affect normal.         Assessment/Plan:    ICD-10-CM ICD-9-CM    1. Dyslipidemia  E78.5 272.4 LIPID PANEL   2. Need for hepatitis C screening test  Z11.59 V73.89 HEPATITIS C AB   Will notify patient of lab results and any further recommendations. Seek care/rtc for any new sxs or other concerns. Pt verbalizes understanding and agrees with the plan.     Kathleen Parker PA-C

## 2022-06-29 NOTE — PROGRESS NOTES
Successful venipuncture in patient's LEFT AC X 1 sticks. The patient tolerated this procedure w/o c/o pain or discomfort.

## 2022-06-29 NOTE — PROGRESS NOTES
1. \"Have you been to the ER, urgent care clinic since your last visit? Hospitalized since your last visit? \" No    2. \"Have you seen or consulted any other health care providers outside of the [address] [place] [address] since your last visit? \" No     3. For patients aged 39-70: Has the patient had a colonoscopy / FIT/ Cologuard? Yes - no Care Gap present      If the patient is female:    4. For patients aged 41-77: Has the patient had a mammogram within the past 2 years? NA - based on age or sex      11. For patients aged 21-65: Has the patient had a pap smear?  NA - based on age or sex

## 2022-06-30 LAB
CHOLEST SERPL-MCNC: 229 MG/DL
HCV AB SERPL QL IA: NONREACTIVE
HDLC SERPL-MCNC: 40 MG/DL
HDLC SERPL: 5.7 {RATIO} (ref 0–5)
LDLC SERPL CALC-MCNC: 176.6 MG/DL (ref 0–100)
TRIGL SERPL-MCNC: 62 MG/DL (ref ?–150)
VLDLC SERPL CALC-MCNC: 12.4 MG/DL

## 2022-06-30 RX ORDER — ATORVASTATIN CALCIUM 10 MG/1
10 TABLET, FILM COATED ORAL DAILY
Qty: 30 TABLET | Refills: 0 | Status: SHIPPED | OUTPATIENT
Start: 2022-06-30

## 2022-06-30 NOTE — PROGRESS NOTES
Notify pt hep c neg. Cholesteorl better but still high. His calculated risk as we discussed in office is high enough to recommend statin therapy, however, results may not be accurate based on his age. He mentioned at his visit he might be interested in lowering his risk with cholesterol medication. I will sent a low dose to try if he is interested. We can have him return and recheck the BW in 3 months. He should d/c it if he notices any unwated side effects, such as new body aches/joint pain.

## 2022-08-29 RX ORDER — ATROPINE SULFATE 0.1 MG/ML
0.5 INJECTION INTRAVENOUS
Status: CANCELLED | OUTPATIENT
Start: 2022-08-29 | End: 2022-08-30

## 2022-08-29 RX ORDER — DEXTROMETHORPHAN/PSEUDOEPHED 2.5-7.5/.8
1.2 DROPS ORAL
Status: CANCELLED | OUTPATIENT
Start: 2022-08-29

## 2022-08-29 RX ORDER — EPINEPHRINE 0.1 MG/ML
1 INJECTION INTRACARDIAC; INTRAVENOUS
Status: CANCELLED | OUTPATIENT
Start: 2022-08-29 | End: 2022-08-30

## 2022-08-29 RX ORDER — DIPHENHYDRAMINE HYDROCHLORIDE 50 MG/ML
50 INJECTION, SOLUTION INTRAMUSCULAR; INTRAVENOUS ONCE
Status: CANCELLED | OUTPATIENT
Start: 2022-08-29 | End: 2022-08-29

## 2022-08-29 RX ORDER — SODIUM CHLORIDE 0.9 % (FLUSH) 0.9 %
5-40 SYRINGE (ML) INJECTION EVERY 8 HOURS
Status: CANCELLED | OUTPATIENT
Start: 2022-08-29

## 2022-08-29 RX ORDER — SODIUM CHLORIDE 0.9 % (FLUSH) 0.9 %
5-40 SYRINGE (ML) INJECTION AS NEEDED
Status: CANCELLED | OUTPATIENT
Start: 2022-08-29

## 2022-08-29 NOTE — H&P
Assessment/Plan  # Detail Type Description    1. Assessment Bleeding of rectum (K62.5). Impression Pt of PILO Yo, here for evaluation and treatment of alarm features: Rectal bleeding and constipation. _________________________________  Pt reports experiencing sudden onset of rectal bleeding with BM's, BRBPR w/BM, blood only ever seen in toilet bowl. This persisted for 23 of the 28 days in February. In March- Frequency of bleeding decreased to every 2nd or 3rd BM (Ranging in color from Bright-Red to Medium-red). Associated with stool that was slightly hard to pass, [place] scale #1. Pt states he saw Dr. Conrad Hyde on //, (no medications given, or procedures done), and has not seen any blood since. He had the next BM 3-4 days after the visit with Dr. Conrad Hyde, [facility] scale: #2-3, without bleeding. With BM frequency that has since returned to baseline (BM: 1/Q2-3 days, w/o bleeding)  _________________________________  *Last colonoscopy was in 2014 by Dr. Ame Ralph @[facility]: Negative exam (Per Pt, No records available). -Old records unavailable at this time, any records recovered will be scanned to chart at later date.  _________________________________  Average risk, no FHx of colon cancer. Asymptomatic of current GI complaints. BMI: 30.24 (Tall Frame). *PM/SH: Prostatitis, s/p Hernia Repair (2020). No reported hx of abdominal surgeries, strokes, or CAD. Patient Plan -Lab Orders placed: CBC, CMP, Ferritin, Iron & TIBC Panel (To stratify blood loss)    *C-scope Plan:  Colonoscopy ordered with Dr. Leola Gonzalez (@[facility] d/t: Advancing Age), with Miralax bowel prep, and Mag Citrate 2 days before prep, and Miralax and stool softeners starting 3 days before prep. *C-scope Risks:  Stressed importance of following all bowel preparation instructions. Explained the procedure to the patient including all risks and benefits.   These risks consist of missed lesions on exam, bleeding, and bowel perforation with possible need for admission to the hospital, and in the most extensive of  circumstances, the patient may require surgery. Pt verbalized understanding of these risks and is agreeable with this procedure.  _________________________________   *Hemorrhoid/Anal Fissure Instructions provided to pt: (Printed copy provided to patient)  Avoid wiping with dry toilet paper, use either moistened toiler paper, pre-moistened wipes, or warm water to cleanse area after bowel movements. Apply OTC Tucks pads, or preparation-H cream as needed to area for itching, and discomfort. Use sitz baths twice daily if needed to soothe the area to allow inflammation to decrease and to promote healing. Use stool softeners twice daily and avoid straining with bowel movements, and sitting for long periods of time. Plan Orders CBC With Differential/Platelet to be performed. , Comp. Metabolic Panel (14) to be performed. , Ferritin, Serum to be performed. and Iron and TIBC to be performed. Further diagnostic evaluations ordered today include(s) DIAGNOSTIC COLONOSCOPY to be performed. 2. Assessment Constipation, unspecified (K59.00). Impression See Above. Patient Plan *Constipation Instructions provided to pt: (Printed copy provided to patient)  -Take Miralax (1 capful in AM) to increase water content in stools. Try to drink more water, walk as often as possible, and eat fresh fruits and vegetables. Try not to neglect eating cooked vegetables, and smoothies as well. Instructed patient to retrain body to attempt BM every morning, do not hold it. Instead try to have a bowel movement when sensation is present.  -Hold fiber supplements while constipated, as this will only make constipation worse. Slowly add supplements back in only after bowels are consistently moving              This 67year old  patient was referred by Jami Edwards. This 67year old male presents for Rectal bleeding. History of Present Illness  1.   Rectal bleeding   Onset: 2 months ago. Severity level is moderate. Location is Rectal.  Duration 2 Months. Quality:  BRBPR w/ bowel movement and maroon stools Bright Red. It occurs randomly. The problem is resolved. Associated symptoms include abdominal pain and constipation. Pertinent negatives include abdominal distention, bloating, change in bowel habits, decreased appetite, diarrhea, dysphagia, heartburn, nausea, perirectal itching, rectal pain, rectal pain associated with bleeding, vomiting and weight loss. Additional information: Last colonoscopy was in  by Dr Domingo Oconnell ( No records available), BM irregular. Past Medical/Surgical History   (Detailed)  Disease/disorder Onset Date Management Date Comments     Rezum 8 treatments 2020    Prostatitis             Family History   (Detailed)    Relationship Family Member Name  Age at Death Condition Onset Age Cause of Death       Family history of Cancer, prostate  N   Family h/o    Cancer - prostate       Social History  (Detailed)  Tobacco use reviewed. Preferred language is [place]. Marital Status/Family/Social Support  Marital status:      Tobacco use status: Never smoked tobacco.    Smoking status: Never smoker. Tobacco Screening  Patient has never used tobacco. Patient has not used tobacco in the last 30 days. Patient has not used smokeless tobacco in the last 30 days. Smoking Status  Type Smoking Status Usage Per Day Years Used Pack Years Total Pack Years    Never smoker         Alcohol  There is a history of alcohol use. Type: [place]. consumed socially. Caffeine  The patient does not use caffeine.       Medications (active prior to today)  Medication Instructions Start Date Stop Date Refilled Elsewhere   Toviaz 8 mg tablet,extended release take 1 tablet by oral route  every day 2021 N   tamsulosin 0.4 mg capsule take 1 capsule by oral route  every day 1/2 hour following the same meal each day 04/06/2022   N   levofloxacin 500 mg tablet take 1 tablet by oral route  every 24 hours 04/06/2022 04/21/2022  N     Patient Status   Completed with information received for patient in a summary of care record. Medication Reconciliation  Medications reconciled today. Medication Reviewed  Adherence Medication Name Sig Desc Elsewhere Status   taking as directed Toviaz 8 mg tablet,extended release take 1 tablet by oral route  every day N Verified   taking as directed tamsulosin 0.4 mg capsule take 1 capsule by oral route  every day 1/2 hour following the same meal each day N Verified     Medications (Added, Continued or Stopped today)  Start Date Medication Directions PRN Status PRN Reason Instruction Stop Date   04/06/2022 levofloxacin 500 mg tablet take 1 tablet by oral route  every 24 hours N   04/21/2022 04/06/2022 tamsulosin 0.4 mg capsule take 1 capsule by oral route  every day 1/2 hour following the same meal each day N      12/30/2021 Toviaz 8 mg tablet,extended release take 1 tablet by oral route  every day N  called to [facility]   pt ID# [id number]      Allergies  Ingredient Reaction (Severity) Medication Name Comment   NO KNOWN ALLERGIES        Reviewed, no changes. Review of Systems  System Neg/Pos Details   Constitutional Negative Fever and Weight loss. ENMT Negative Dysphagia and Sinus Infection. Eyes Negative Double vision. Respiratory Negative Asthma, Chronic cough and Dyspnea. Cardio Negative Chest pain, Edema and Irregular heartbeat/palpitations. GI Positive Abdominal pain, Constipation. GI Negative Abdominal distention, Bloating, Change in bowel habits, Decreased appetite, Diarrhea, Dysphagia, Heartburn, Hematemesis, Hematochezia, Melena, Nausea, Rectal pain, Rectal pain associated w/ bleeding, Reflux and Vomiting.  Negative Dysuria and Hematuria. Endocrine Negative Cold intolerance and Heat intolerance.    Neuro Negative Dizziness, Headache, Numbness and Tremors. Psych Negative Anxiety, Depression and Increased stress. Integumentary Negative Hives, Perirectal itching, Pruritus and Rash. MS Negative Back pain, Joint pain and Myalgia. Hema/Lymph Negative Easy bleeding, Easy bruising and Lymphadenopathy. Allergic/Immuno Negative Food allergies and Immunosuppression. Vital Signs   Height  Time ft in cm Last Measured Height Position   9:28 AM 6.0 0.00 182.88 04/21/2022 Standing     Weight/BSA/BMI  Time lb oz kg Context BMI kg/m2 BSA m2   9:28 .00  101.151 dressed with shoes 30.24 2.27     Arterial Line 1 BP (mmHg) BP Patient Position Resp SpO2 O2 Device O2 Flow Rate (L/min) Level of Consciousness MEWS Score Weight       08/30/22 0914 98.1 °F (36.7 °C) 53 Abnormal  116/70 -- -- 20 97 % None (Room air) -- 0 1 94.1 kg (207 lb 8 oz)     Physical  Exam  Exam Findings Details   Male GI Quick Exam Comments Tall Frame. Constitutional Normal Well developed. Eyes Normal Conjunctiva - Right: Normal, Left: Normal. Sclera - Right: Normal, Left: Normal.   Nasopharynx Normal Lips/teeth/gums - Normal.   Neck Exam Normal Inspection - Normal.   Respiratory Normal Inspection - Normal.   Cardiovascular Normal Regular rate and rhythm. No murmurs, gallops, or rubs. Vascular Normal Pulses - Brachial: Normal.   Skin Normal Inspection - Normal.   Musculoskeletal Normal Hands/Wrist - Right: Normal, Left: Normal.   Extremity Normal No edema. Neurological Normal Fine motor skills - Normal.   Psychiatric Normal Orientation - Oriented to time, place, person & situation. Appropriate mood and affect.      Active Patient Care Team Members  Name Contact Agency Type Support Role Relationship Active Date Inactive Date Specialty   Ifeanyichukwu Ani   encounter provider    Urology   [facility]   Emergency Contact Spouse      Theresa Nur   encounter provider    Gastroenterology   Charlie Coombs   Patient provider PCP      Charlie Coombs   primary care provider       No change in H&P

## 2022-08-30 ENCOUNTER — HOSPITAL ENCOUNTER (OUTPATIENT)
Age: 77
Setting detail: OUTPATIENT SURGERY
Discharge: HOME | End: 2022-08-30
Attending: INTERNAL MEDICINE | Admitting: INTERNAL MEDICINE
Payer: MEDICARE

## 2022-08-30 VITALS
BODY MASS INDEX: 28.1 KG/M2 | DIASTOLIC BLOOD PRESSURE: 60 MMHG | RESPIRATION RATE: 18 BRPM | HEART RATE: 52 BPM | HEIGHT: 72 IN | WEIGHT: 207.5 LBS | TEMPERATURE: 97.6 F | OXYGEN SATURATION: 100 % | SYSTOLIC BLOOD PRESSURE: 122 MMHG

## 2022-08-30 PROCEDURE — 2709999900 HC NON-CHARGEABLE SUPPLY: Performed by: INTERNAL MEDICINE

## 2022-08-30 PROCEDURE — 77030040361 HC SLV COMPR DVT MDII -B: Performed by: INTERNAL MEDICINE

## 2022-08-30 PROCEDURE — 76040000007: Performed by: INTERNAL MEDICINE

## 2022-08-30 PROCEDURE — 77030013991 HC SNR POLYP ENDOSC BSC -A: Performed by: INTERNAL MEDICINE

## 2022-08-30 PROCEDURE — 88305 TISSUE EXAM BY PATHOLOGIST: CPT

## 2022-08-30 PROCEDURE — G0500 MOD SEDAT ENDO SERVICE >5YRS: HCPCS | Performed by: INTERNAL MEDICINE

## 2022-08-30 PROCEDURE — 74011250636 HC RX REV CODE- 250/636: Performed by: INTERNAL MEDICINE

## 2022-08-30 PROCEDURE — 99153 MOD SED SAME PHYS/QHP EA: CPT | Performed by: INTERNAL MEDICINE

## 2022-08-30 RX ORDER — SODIUM CHLORIDE 9 MG/ML
1000 INJECTION, SOLUTION INTRAVENOUS CONTINUOUS
Status: DISCONTINUED | OUTPATIENT
Start: 2022-08-30 | End: 2022-08-30 | Stop reason: HOSPADM

## 2022-08-30 RX ORDER — FENTANYL CITRATE 50 UG/ML
100 INJECTION, SOLUTION INTRAMUSCULAR; INTRAVENOUS
Status: DISCONTINUED | OUTPATIENT
Start: 2022-08-30 | End: 2022-08-30 | Stop reason: HOSPADM

## 2022-08-30 RX ORDER — FLUMAZENIL 0.1 MG/ML
0.2 INJECTION INTRAVENOUS
Status: DISCONTINUED | OUTPATIENT
Start: 2022-08-30 | End: 2022-08-30 | Stop reason: HOSPADM

## 2022-08-30 RX ORDER — MIDAZOLAM HYDROCHLORIDE 1 MG/ML
.25-5 INJECTION, SOLUTION INTRAMUSCULAR; INTRAVENOUS
Status: DISCONTINUED | OUTPATIENT
Start: 2022-08-30 | End: 2022-08-30 | Stop reason: HOSPADM

## 2022-08-30 RX ORDER — NALOXONE HYDROCHLORIDE 0.4 MG/ML
0.4 INJECTION, SOLUTION INTRAMUSCULAR; INTRAVENOUS; SUBCUTANEOUS
Status: DISCONTINUED | OUTPATIENT
Start: 2022-08-30 | End: 2022-08-30 | Stop reason: HOSPADM

## 2022-08-30 RX ADMIN — SODIUM CHLORIDE 1000 ML: 9 INJECTION, SOLUTION INTRAVENOUS at 09:17

## 2022-08-30 NOTE — DISCHARGE INSTRUCTIONS
[facility]  [id number]  1945    COLON DISCHARGE INSTRUCTIONS    Discomfort:  Redness at IV site- apply warm compress to area; if redness or soreness persist- contact your physician  There may be a slight amount of blood passed from the rectum  Gaseous discomfort- walking, belching will help relieve any discomfort  You may not operate a vehicle til the next day. You may not engage in an occupation involving machinery or appliances til the next day. You may not drink alcoholic beverages til the next day. DIET:   High fiber diet. ACTIVITY:  You may not  resume your normal daily activities til the next day. it is recommended that you spend the remainder of the day resting -  avoid any strenuous activity. CALL M.D.  IF ANY SIGN OF:   Increasing pain, nausea, vomiting  Abdominal distension (swelling)  New increased bleeding (oral or rectal)  Fever (chills)  Pain in chest area  Bloody discharge from nose or mouth  Shortness of breath    You may not  take any Advil, Aspirin, Ibuprofen, Motrin, Aleve, or Goodys for 10 days, ONLY  Tylenol as needed for pain. Post procedure diagnosis:  HEMORRHOIDS; ENLARGED PROSTRATE; SIGMOID DIVERTICULOSIS; TORTUOUS SIGMOID    Follow-up Instructions: Your follow up colonoscopy is optional in 10 years. We will notify you the results of your biopsy through the portal within 2 weeks. Jamaica Sharp MD  August 30, 2022       DISCHARGE SUMMARY from Nurse    PATIENT INSTRUCTIONS:    After general anesthesia or intravenous sedation, for 24 hours or while taking prescription Narcotics:  Limit your activities  Do not drive and operate hazardous machinery  Do not make important personal or business decisions  Do  not drink alcoholic beverages  If you have not urinated within 8 hours after discharge, please contact your surgeon on call.     Report the following to your surgeon:  Excessive pain, swelling, redness or odor of or around the surgical area  Temperature over 100.5  Nausea and vomiting lasting longer than 4 hours or if unable to take medications  Any signs of decreased circulation or nerve impairment to extremity: change in color, persistent  numbness, tingling, coldness or increase pain  Any questions    What to do at Home:  Recommended activity: as above,     If you experience any of the following symptoms as above, please follow up with Dr. Venita Bradley. *  Please give a list of your current medications to your Primary Care Provider. *  Please update this list whenever your medications are discontinued, doses are      changed, or new medications (including over-the-counter products) are added. *  Please carry medication information at all times in case of emergency situations. These are general instructions for a healthy lifestyle:    No smoking/ No tobacco products/ Avoid exposure to second hand smoke  Surgeon General's Warning:  Quitting smoking now greatly reduces serious risk to your health. Obesity, smoking, and sedentary lifestyle greatly increases your risk for illness    A healthy diet, regular physical exercise & weight monitoring are important for maintaining a healthy lifestyle    You may be retaining fluid if you have a history of heart failure or if you experience any of the following symptoms:  Weight gain of 3 pounds or more overnight or 5 pounds in a week, increased swelling in our hands or feet or shortness of breath while lying flat in bed. Please call your doctor as soon as you notice any of these symptoms; do not wait until your next office visit. The discharge information has been reviewed with the patient and spouse. The patient and spouse verbalized understanding.   Discharge medications reviewed with the patient and spouse and appropriate educational materials and side effects teaching were provided.   ___________________________________________________________________________________________________________________________________    Patient armband removed and shredded

## 2022-08-30 NOTE — PROCEDURES
[facility]  Colonoscopy Procedure Report  _______________________________________________________  Patient: Kurt Conte                                        Attending Physician: Artie Hart MD    Patient ID: [id number]                                    Referring Physician: Ileana Myrick    Exam Date: 8/30/2022      Introduction: A  67 y.o. male patient, presents for inpatient Colonoscopy    Indications: Pt of PILO Meneses, here for evaluation and treatment of alarm features: Rectal bleeding and constipation. Pt reports experiencing sudden onset of rectal bleeding with BM's, BRBPR w/BM, blood only ever seen in toilet bowl. This persisted for 23 of the 28 days in February. In March- Frequency of bleeding decreased to every 2nd or 3rd BM (Ranging in color from Bright-Red to Medium-red). Associated with stool that was slightly hard to pass, [place] scale #1. Pt states he saw Dr. Umm Sky on //, (no medications given, or procedures done), and has not seen any blood since. He had the next BM 3-4 days after the visit with Dr. Umm Sky, [facility] scale: #2-3, without bleeding. With BM frequency that has since returned to baseline (BM: 1/Q2-3 days, w/o bleeding)  Last colonoscopy was in 2014 by Dr. Joaquin Phillip @[facility]: Negative exam (Per Pt, No records available). No FHx of colon cancer. Asymptomatic of current GI complaints. BMI: 30.24 *PM/SH: Prostatitis, s/p Hernia Repair (2020). Consent: The benefits, risks, and alternatives to the procedure were discussed and informed consent was obtained from the patient. Preparation: EKG, pulse, pulse oximetry and blood pressure were monitored throughout the procedure. ASA Classification: Class I- . The heart is an S1-S2 and regular heart rate and rhythm. Lungs are clear to auscultation and percussion. Abdomen is soft, nondistended, and nontender.  Mental Status: awake, alert, and oriented to person, place, and time    Medications:  Fentanyl 100 mcg IV before procedure. Versed 4 mg IV throughout the procedure. Rectal Exam: Normal Rectal Exam. No Blood. Prostate slightly enlarged but not  nodular. Pathology Specimens:  1    Procedure: The colonoscope was passed with ease through the anus under direct visualization and advanced to the cecum and 5 cm inside the terminal ileum. Retroflexion is made in the ascending colon. The patient required positioning on the back to aid in the passage of the scope. The scope was withdrawn and the mucosa was carefully examined. The quality of the preparation was good. The views were excellent. The patient's toleration of the procedure was excellent. The exam was done twice to the cecum. Total time is 31 minutes and withdrawal time is 27 minutes. Findings:    Rectum:   Small internal hemorrhoids. Sigmoid:   Slightly tortuous sigmoid colon with moderate sigmoid diverticulosis. Descending Colon:   Normal   Transverse Colon:   Normal   Ascending Colon:   Flattened sessile polyp hard to see ridge on top of a fold in the very proximal ascending colon 6 x 18 mm. It is hot snared then the edges trimmed after saline  injection  Cecum:   The cecum is located in the RUQ where the hepatic  flexure was located in the RLQ suggestive of possible colonic  malrotation? Terminal Ileum:   Normal.       Unplanned Events: There were no unplanned events. Estimated Blood Loss: None  IMPLANTS: * No implants in log *  Impressions: Small external and internal hemorrhoids. Slightly tortuous sigmoid colon with moderate sigmoid diverticulosis. Flattened sessile polyp hard to see ridge on top of a fold in the very proximal ascending colon 6 x 18 mm. It is hot snared then the edges trimmed after saline  injection. The cecum is located in the RUQ where the hepatic  flexure was located in the RLQ suggestive of possible colonic  malrotation? Normal Mucosa. No blood or AVM found.     Complications: None; patient tolerated the procedure well.    Recommendations:  Discharge home when standard parameters are met. Resume a high fiber diet. Resume own medications. Avoid all NSAID's for 10 days  Colonoscopy is optional in 10 years.   Take Miralax and/ or Colace 100 mg on regular basis if constipated    Procedure Codes:    Ainsley Gilbert [[id number]]  Ainsley Gilbert [[id number]]    Endoscope Information:  Model Number(s)    [id number]   Assistant: None  Signed By: Javier Franco MD Date: 8/30/2022

## 2022-10-04 PROCEDURE — 77030012862 HC BG URIN LEG BARD -A

## 2022-10-04 PROCEDURE — 99284 EMERGENCY DEPT VISIT MOD MDM: CPT

## 2022-10-04 PROCEDURE — 77030034849

## 2022-10-05 ENCOUNTER — HOSPITAL ENCOUNTER (EMERGENCY)
Age: 77
Discharge: HOME | End: 2022-10-05
Attending: FAMILY MEDICINE
Payer: MEDICARE

## 2022-10-05 VITALS
WEIGHT: 210 LBS | DIASTOLIC BLOOD PRESSURE: 60 MMHG | HEIGHT: 72 IN | HEART RATE: 62 BPM | OXYGEN SATURATION: 96 % | TEMPERATURE: 97.8 F | RESPIRATION RATE: 18 BRPM | SYSTOLIC BLOOD PRESSURE: 112 MMHG | BODY MASS INDEX: 28.44 KG/M2

## 2022-10-05 DIAGNOSIS — R33.9 URINARY RETENTION: Primary | ICD-10-CM

## 2022-10-05 LAB
ALBUMIN SERPL-MCNC: 4.2 G/DL (ref 3.5–5)
ALBUMIN/GLOB SERPL: 1.2 {RATIO} (ref 1.1–2.2)
ALP SERPL-CCNC: 89 U/L (ref 45–117)
ALT SERPL-CCNC: 21 U/L (ref 12–78)
ANION GAP SERPL CALC-SCNC: 10 MMOL/L (ref 5–15)
APPEARANCE UR: CLEAR
AST SERPL-CCNC: 20 U/L (ref 15–37)
BACTERIA URNS QL MICRO: NEGATIVE /HPF
BASOPHILS # BLD: 0.1 K/UL (ref 0–0.1)
BASOPHILS NFR BLD: 1 % (ref 0–1)
BILIRUB SERPL-MCNC: 0.7 MG/DL (ref 0.2–1)
BILIRUB UR QL: NEGATIVE
BUN SERPL-MCNC: 24 MG/DL (ref 6–20)
BUN/CREAT SERPL: 17 (ref 12–20)
CALCIUM SERPL-MCNC: 9.3 MG/DL (ref 8.5–10.1)
CHLORIDE SERPL-SCNC: 103 MMOL/L (ref 97–108)
CO2 SERPL-SCNC: 27 MMOL/L (ref 21–32)
COLOR UR: ABNORMAL
CREAT SERPL-MCNC: 1.39 MG/DL (ref 0.7–1.3)
DIFFERENTIAL METHOD BLD: ABNORMAL
EOSINOPHIL # BLD: 0 K/UL (ref 0–0.4)
EOSINOPHIL NFR BLD: 0 % (ref 0–7)
EPITH CASTS URNS QL MICRO: ABNORMAL /LPF
ERYTHROCYTE [DISTWIDTH] IN BLOOD BY AUTOMATED COUNT: 13.9 % (ref 11.5–14.5)
GLOBULIN SER CALC-MCNC: 3.4 G/DL (ref 2–4)
GLUCOSE SERPL-MCNC: 160 MG/DL (ref 65–100)
GLUCOSE UR STRIP.AUTO-MCNC: NEGATIVE MG/DL
HCT VFR BLD AUTO: 42.6 % (ref 36.6–50.3)
HGB BLD-MCNC: 14.3 G/DL (ref 12.1–17)
HGB UR QL STRIP: ABNORMAL
IMM GRANULOCYTES # BLD AUTO: 0 K/UL (ref 0–0.04)
IMM GRANULOCYTES NFR BLD AUTO: 0 % (ref 0–0.5)
KETONES UR QL STRIP.AUTO: NEGATIVE MG/DL
LEUKOCYTE ESTERASE UR QL STRIP.AUTO: NEGATIVE
LYMPHOCYTES # BLD: 1.2 K/UL (ref 0.8–3.5)
LYMPHOCYTES NFR BLD: 12 % (ref 12–49)
MCH RBC QN AUTO: 32.8 PG (ref 26–34)
MCHC RBC AUTO-ENTMCNC: 33.6 G/DL (ref 30–36.5)
MCV RBC AUTO: 97.7 FL (ref 80–99)
MONOCYTES # BLD: 0.7 K/UL (ref 0–1)
MONOCYTES NFR BLD: 7 % (ref 5–13)
NEUTS SEG # BLD: 8.3 K/UL (ref 1.8–8)
NEUTS SEG NFR BLD: 80 % (ref 32–75)
NITRITE UR QL STRIP.AUTO: NEGATIVE
NRBC # BLD: 0 K/UL (ref 0–0.01)
NRBC BLD-RTO: 0 PER 100 WBC
PH UR STRIP: 5.5 [PH] (ref 5–8)
PLATELET # BLD AUTO: 229 K/UL (ref 150–400)
PMV BLD AUTO: 10.1 FL (ref 8.9–12.9)
POTASSIUM SERPL-SCNC: 4.6 MMOL/L (ref 3.5–5.1)
PROT SERPL-MCNC: 7.6 G/DL (ref 6.4–8.2)
PROT UR STRIP-MCNC: NEGATIVE MG/DL
RBC # BLD AUTO: 4.36 M/UL (ref 4.1–5.7)
RBC #/AREA URNS HPF: ABNORMAL /HPF (ref 0–5)
SODIUM SERPL-SCNC: 140 MMOL/L (ref 136–145)
SP GR UR REFRACTOMETRY: 1.02 (ref 1–1.03)
UROBILINOGEN UR QL STRIP.AUTO: 0.2 EU/DL (ref 0.2–1)
WBC # BLD AUTO: 10.3 K/UL (ref 4.1–11.1)
WBC URNS QL MICRO: ABNORMAL /HPF (ref 0–4)

## 2022-10-05 PROCEDURE — 80053 COMPREHEN METABOLIC PANEL: CPT

## 2022-10-05 PROCEDURE — 85025 COMPLETE CBC W/AUTO DIFF WBC: CPT

## 2022-10-05 PROCEDURE — 36415 COLL VENOUS BLD VENIPUNCTURE: CPT

## 2022-10-05 PROCEDURE — 96374 THER/PROPH/DIAG INJ IV PUSH: CPT

## 2022-10-05 PROCEDURE — 74011000250 HC RX REV CODE- 250: Performed by: FAMILY MEDICINE

## 2022-10-05 PROCEDURE — 51702 INSERT TEMP BLADDER CATH: CPT

## 2022-10-05 PROCEDURE — 81001 URINALYSIS AUTO W/SCOPE: CPT

## 2022-10-05 PROCEDURE — 74011250636 HC RX REV CODE- 250/636: Performed by: FAMILY MEDICINE

## 2022-10-05 RX ORDER — ONDANSETRON 2 MG/ML
4 INJECTION INTRAMUSCULAR; INTRAVENOUS
Status: COMPLETED | OUTPATIENT
Start: 2022-10-05 | End: 2022-10-05

## 2022-10-05 RX ORDER — LIDOCAINE HYDROCHLORIDE 20 MG/ML
JELLY TOPICAL
Status: COMPLETED | OUTPATIENT
Start: 2022-10-05 | End: 2022-10-05

## 2022-10-05 RX ADMIN — SODIUM CHLORIDE 1000 ML: 9 INJECTION, SOLUTION INTRAVENOUS at 00:51

## 2022-10-05 RX ADMIN — LIDOCAINE HYDROCHLORIDE 20 ML: 20 JELLY TOPICAL at 00:19

## 2022-10-05 RX ADMIN — ONDANSETRON 4 MG: 2 INJECTION INTRAMUSCULAR; INTRAVENOUS at 00:19

## 2022-10-05 NOTE — ED PROVIDER NOTES
EMERGENCY DEPARTMENT HISTORY AND PHYSICAL EXAM          Date: 10/5/2022  Patient Name: Denise Baez    History of Presenting Illness     Chief Complaint   Patient presents with    Urinary Pain    Abdominal Pain       History Provided By: Patient and Patient's Wife    HPI: Denise Baez is a 67 y.o. male, pmhx listed below, who presents to the ED c/o lower abdominal pain and difficulty urinating for the last 2-3 days. He has had urinary retention before, about 18 months ago, and at that time he had sepsis. This afternoon and this evening he had more trouble urinating, and he began vomiting, so he came to the ED for evaluation. He has had no fevers, chills, nausea, vomiting, headaches, or mental confusion. He normally sees a urologist in [facility] [place] [address]. PCP: Keon Bal PA-C    Allergies: NKDA  Social Hx: No tobacco, vaping, 4 drinks/week, Illicit Drugs; Lives locally with wife. There are no other complaints, changes, or physical findings at this time. Current Outpatient Medications   Medication Sig Dispense Refill    tamsulosin (FLOMAX) 0.4 mg capsule       atorvastatin (LIPITOR) 10 mg tablet Take 1 Tablet by mouth daily.  (Patient not taking: Reported on 10/4/2022) 30 Tablet 0       Past History     Past Medical History:  Past Medical History:   Diagnosis Date    Bradycardia     Dyslipidemia     Elevated PSA     Inguinal hernia     right    Skin lesion of face     UTI (urinary tract infection)        Past Surgical History:  Past Surgical History:   Procedure Laterality Date    COLONOSCOPY N/A 8/19/2019    [facility]    COLONOSCOPY N/A 8/30/2022    COLONOSCOPY; POLYPECTOMY; SCLERO-INJECTION WITH SALINE (ASCENDING COLON) performed by Benny Watkins MD at [facility]  8/19/2019         HX TONSILLECTOMY      HX UROLOGICAL      cysto       Family History:  Family History   Problem Relation Age of Onset    No Known Problems Mother     Alcohol abuse Father     Coronary Art Dis Neg Hx        Social History:  Social History     Tobacco Use    Smoking status: Former     Packs/day: 1.00     Years: 5.00     Pack years: 5.00     Types: Cigarettes     Quit date:      Years since quittin.7    Smokeless tobacco: Never   Vaping Use    Vaping Use: Never used   Substance Use Topics    Alcohol use: Yes     Alcohol/week: 3.0 - 4.0 standard drinks     Types: 3 - 4 Shots of liquor per week     Comment: 4 oz per drink 3-4 times weekly    Drug use: Never       Allergies:  No Known Allergies      Review of Systems   Review of Systems   Constitutional:  Positive for appetite change. Negative for fever. HENT:  Negative for congestion and sore throat. Gastrointestinal:  Positive for abdominal pain, nausea and vomiting. Genitourinary:  Positive for difficulty urinating. Physical Exam   Physical Exam  Vitals reviewed. Constitutional:       General: He is not in acute distress. Appearance: He is well-developed. He is not diaphoretic. Comments: Examined after urinary catheter placed. HENT:      Head: Normocephalic. Right Ear: External ear normal.      Left Ear: External ear normal.      Nose: Nose normal.      Mouth/Throat:      Mouth: Mucous membranes are moist.      Pharynx: No oropharyngeal exudate. Eyes:      General: No scleral icterus. Right eye: No discharge. Left eye: No discharge. Conjunctiva/sclera: Conjunctivae normal.      Pupils: Pupils are equal, round, and reactive to light. Neck:      Thyroid: No thyromegaly. Vascular: No JVD. Trachea: No tracheal deviation. Cardiovascular:      Rate and Rhythm: Normal rate and regular rhythm. Heart sounds: Normal heart sounds. No murmur heard. No friction rub. No gallop. Pulmonary:      Effort: Pulmonary effort is normal. No respiratory distress. Breath sounds: No wheezing or rales.    Abdominal:      General: Bowel sounds are normal. There is no distension. Palpations: Abdomen is soft. Tenderness: no abdominal tenderness There is no rebound. Hernia: No hernia is present. Genitourinary:     Penis: Normal.       Testes: Normal.   Musculoskeletal:         General: No tenderness or deformity. Normal range of motion. Cervical back: Normal range of motion and neck supple. Skin:     General: Skin is warm and dry. Neurological:      General: No focal deficit present. Mental Status: He is alert and oriented to person, place, and time. Cranial Nerves: No cranial nerve deficit. Coordination: Coordination normal.      Deep Tendon Reflexes: Reflexes are normal and symmetric. Psychiatric:         Behavior: Behavior normal.       Diagnostic Study Results     Labs -     Recent Results (from the past 12 hour(s))   CBC WITH AUTOMATED DIFF    Collection Time: 10/05/22 12:12 AM   Result Value Ref Range    WBC 10.3 4.1 - 11.1 K/uL    RBC 4.36 4.10 - 5.70 M/uL    HGB 14.3 12.1 - 17.0 g/dL    HCT 42.6 36.6 - 50.3 %    MCV 97.7 80.0 - 99.0 FL    MCH 32.8 26.0 - 34.0 PG    MCHC 33.6 30.0 - 36.5 g/dL    RDW 13.9 11.5 - 14.5 %    PLATELET 027 585 - 173 K/uL    MPV 10.1 8.9 - 12.9 FL    NRBC 0.0 0  WBC    ABSOLUTE NRBC 0.00 0.00 - 0.01 K/uL    NEUTROPHILS 80 (H) 32 - 75 %    LYMPHOCYTES 12 12 - 49 %    MONOCYTES 7 5 - 13 %    EOSINOPHILS 0 0 - 7 %    BASOPHILS 1 0 - 1 %    IMMATURE GRANULOCYTES 0 0.0 - 0.5 %    ABS. NEUTROPHILS 8.3 (H) 1.8 - 8.0 K/UL    ABS. LYMPHOCYTES 1.2 0.8 - 3.5 K/UL    ABS. MONOCYTES 0.7 0.0 - 1.0 K/UL    ABS. EOSINOPHILS 0.0 0.0 - 0.4 K/UL    ABS. BASOPHILS 0.1 0.0 - 0.1 K/UL    ABS. IMM.  GRANS. 0.0 0.00 - 0.04 K/UL    DF AUTOMATED     METABOLIC PANEL, COMPREHENSIVE    Collection Time: 10/05/22 12:12 AM   Result Value Ref Range    Sodium 140 136 - 145 mmol/L    Potassium 4.6 3.5 - 5.1 mmol/L    Chloride 103 97 - 108 mmol/L    CO2 27 21 - 32 mmol/L    Anion gap 10 5 - 15 mmol/L    Glucose 160 (H) 65 - 100 mg/dL    BUN 24 (H) 6 - 20 MG/DL    Creatinine 1.39 (H) 0.70 - 1.30 MG/DL    BUN/Creatinine ratio 17 12 - 20      eGFR 52 (L) >60 ml/min/1.73m2    Calcium 9.3 8.5 - 10.1 MG/DL    Bilirubin, total 0.7 0.2 - 1.0 MG/DL    ALT (SGPT) 21 12 - 78 U/L    AST (SGOT) 20 15 - 37 U/L    Alk. phosphatase 89 45 - 117 U/L    Protein, total 7.6 6.4 - 8.2 g/dL    Albumin 4.2 3.5 - 5.0 g/dL    Globulin 3.4 2.0 - 4.0 g/dL    A-G Ratio 1.2 1.1 - 2.2     URINALYSIS W/MICROSCOPIC    Collection Time: 10/05/22 12:32 AM   Result Value Ref Range    Color YELLOW/STRAW      Appearance CLEAR CLEAR      Specific gravity 1.020 1.003 - 1.030      pH (UA) 5.5 5.0 - 8.0      Protein Negative NEG mg/dL    Glucose Negative NEG mg/dL    Ketone Negative NEG mg/dL    Bilirubin Negative NEG      Blood SMALL (A) NEG      Urobilinogen 0.2 0.2 - 1.0 EU/dL    Nitrites Negative NEG      Leukocyte Esterase Negative NEG      WBC 0-4 0 - 4 /hpf    RBC 5-10 0 - 5 /hpf    Epithelial cells FEW FEW /lpf    Bacteria Negative NEG /hpf         Medical Decision Making   I am the first provider for this patient. I reviewed the vital signs, available nursing notes, past medical history, past surgical history, family history and social history. Vital Signs-Reviewed the patient's vital signs. Patient Vitals for the past 12 hrs:   Temp Pulse Resp BP SpO2   10/05/22 0000 -- -- -- -- 95 %   10/04/22 2356 97.8 °F (36.6 °C) 75 21 (!) 141/66 95 %       Pulse Oximetry Analysis - 95% on RA    Records Reviewed: Nursing Notes, Old Medical Records, Previous Radiology Studies, and Previous Laboratory Studies    Provider Notes (Medical Decision Making):   MDM: Older man with urinary retention. Could have fever, cystitis, acute kidney injury, in addition to urinary retention. ED Course:   Initial assessment performed.  The patients presenting problems have been discussed, and they are in agreement with the care plan formulated and outlined with them. I have encouraged them to ask questions as they arise throughout their visit. PROGRESS NOTE:  Finnegan catheter placed and 1100 ml of urine drained. Urine sent to lab. CBC, CMP ordered. ED Course as of 10/05/22 0209   Wed Oct 05, 2022   0118 You had 1100 ml urine in your bladder, and a catheter placed. You had a creatinine of 1.39; baseline of 0.9. We gave you a liter of saline. [VG]      ED Course User Index  [VG] Liz Harmon MD        Discharge note:  Pt re-evaluated and noted to be feeling better , ready for discharge. Updated pt  on all final lab  findings. Will follow up as instructed . All questions have been answered, pt voiced understanding and agreement with plan. Abx were prescribed, pt advised that diarrhea and rash are possible side effects of the medications. Specific return precautions provided as well as instructions to return to the ED should sx worsen at any time. Vital signs stable for discharge. Critical Care Time: 0      Diagnosis     Clinical Impression:   1. Urinary retention        PLAN:  1. Finnegan   Current Discharge Medication List        2. Urology  Follow-up Information    None       Return to ED if worse     Disposition:  Home       .

## 2022-10-05 NOTE — DISCHARGE INSTRUCTIONS
--Keep your catheter in place until your urologist removes it. Call your urologist tomorrow for an appointment in the office.

## 2022-10-05 NOTE — ED TRIAGE NOTES
Pt reports that he has been having trouble voiding and abdominal pain x 2 days. Reports chills, cough, vomiting x 1 hour. States that it feels like he has a blockage in his penis. Pt on Flomax.

## 2022-10-05 NOTE — ED NOTES
Finnegan catheter placed to leg bag per MD. Clear yellow urine flowing freely.  Pt denies pain, n/v.

## 2022-10-10 ENCOUNTER — HOSPITAL ENCOUNTER (EMERGENCY)
Age: 77
Discharge: HOME | End: 2022-10-10
Attending: EMERGENCY MEDICINE
Payer: MEDICARE

## 2022-10-10 VITALS
SYSTOLIC BLOOD PRESSURE: 132 MMHG | OXYGEN SATURATION: 100 % | WEIGHT: 218 LBS | DIASTOLIC BLOOD PRESSURE: 70 MMHG | RESPIRATION RATE: 17 BRPM | TEMPERATURE: 98.1 F | HEIGHT: 72 IN | BODY MASS INDEX: 29.53 KG/M2 | HEART RATE: 78 BPM

## 2022-10-10 DIAGNOSIS — N39.0 URINARY TRACT INFECTION WITHOUT HEMATURIA, SITE UNSPECIFIED: Primary | ICD-10-CM

## 2022-10-10 DIAGNOSIS — R33.9 URINARY RETENTION: ICD-10-CM

## 2022-10-10 LAB
APPEARANCE UR: ABNORMAL
BACTERIA URNS QL MICRO: NEGATIVE /HPF
BILIRUB UR QL: NEGATIVE
COLOR UR: ABNORMAL
EPITH CASTS URNS QL MICRO: ABNORMAL /LPF
GLUCOSE UR STRIP.AUTO-MCNC: NEGATIVE MG/DL
HGB UR QL STRIP: ABNORMAL
KETONES UR QL STRIP.AUTO: NEGATIVE MG/DL
LEUKOCYTE ESTERASE UR QL STRIP.AUTO: ABNORMAL
NITRITE UR QL STRIP.AUTO: NEGATIVE
PH UR STRIP: 6.5 [PH] (ref 5–8)
PROT UR STRIP-MCNC: 100 MG/DL
RBC #/AREA URNS HPF: ABNORMAL /HPF (ref 0–5)
SP GR UR REFRACTOMETRY: 1.02 (ref 1–1.03)
UA: UC IF INDICATED,UAUC: ABNORMAL
UROBILINOGEN UR QL STRIP.AUTO: 0.2 EU/DL (ref 0.2–1)
WBC URNS QL MICRO: >100 /HPF (ref 0–4)

## 2022-10-10 PROCEDURE — 87186 SC STD MICRODIL/AGAR DIL: CPT

## 2022-10-10 PROCEDURE — 81001 URINALYSIS AUTO W/SCOPE: CPT

## 2022-10-10 PROCEDURE — 87077 CULTURE AEROBIC IDENTIFY: CPT

## 2022-10-10 PROCEDURE — 74011250637 HC RX REV CODE- 250/637: Performed by: EMERGENCY MEDICINE

## 2022-10-10 PROCEDURE — 99283 EMERGENCY DEPT VISIT LOW MDM: CPT

## 2022-10-10 PROCEDURE — 87086 URINE CULTURE/COLONY COUNT: CPT

## 2022-10-10 PROCEDURE — 51702 INSERT TEMP BLADDER CATH: CPT

## 2022-10-10 RX ORDER — CEFDINIR 300 MG/1
300 CAPSULE ORAL
Status: COMPLETED | OUTPATIENT
Start: 2022-10-10 | End: 2022-10-10

## 2022-10-10 RX ORDER — CEFDINIR 300 MG/1
300 CAPSULE ORAL EVERY 12 HOURS
Status: DISCONTINUED | OUTPATIENT
Start: 2022-10-10 | End: 2022-10-10

## 2022-10-10 RX ORDER — CEFDINIR 300 MG/1
300 CAPSULE ORAL 2 TIMES DAILY
Qty: 20 CAPSULE | Refills: 0 | Status: SHIPPED | OUTPATIENT
Start: 2022-10-10 | End: 2022-10-20

## 2022-10-10 RX ADMIN — CEFDINIR 300 MG: 300 CAPSULE ORAL at 19:45

## 2022-10-10 NOTE — ED TRIAGE NOTES
Pt arrived by POV for urinary retention. Per pt he was seen on 10/5/22 for urinary retention and a smith was placed, pt see Urology today and the smith was removed, pt reports last urination was at 1530 and he is feeling blockage up.   Pt is awake alert and oriented x 4, pt educated on ER flow

## 2022-10-13 LAB
BACTERIA SPEC CULT: ABNORMAL
CC UR VC: ABNORMAL
SERVICE CMNT-IMP: ABNORMAL

## 2022-10-13 RX ORDER — LEVOFLOXACIN 750 MG/1
750 TABLET ORAL DAILY
Qty: 5 TABLET | Refills: 0 | Status: SHIPPED | OUTPATIENT
Start: 2022-10-13 | End: 2022-10-18

## 2022-10-14 NOTE — ED PROVIDER NOTES
EMERGENCY DEPARTMENT HISTORY AND PHYSICAL EXAM      Date: 10/10/2022  Patient Name: Lizett Faye    History of Presenting Illness     Chief Complaint   Patient presents with    Urinary Retention       History Provided By: Patient    HPI: Lizett Faye, 67 y.o. male with PMHx as noted below presents to the ED with cc of urinary retention. Onset of sx around 3:30pm.  Reports moderate suprapubic pressure, constant, severe, non-radiating. Denies flank pain, fevers or systemic symptoms. PCP: Lucie Beebe PA-C    Current Outpatient Medications   Medication Sig Dispense Refill    levoFLOXacin (Levaquin) 750 mg tablet Take 1 Tablet by mouth daily for 5 days. 5 Tablet 0    cefdinir (OMNICEF) 300 mg capsule Take 1 Capsule by mouth two (2) times a day for 10 days. 20 Capsule 0    atorvastatin (LIPITOR) 10 mg tablet Take 1 Tablet by mouth daily.  (Patient not taking: Reported on 10/4/2022) 30 Tablet 0    tamsulosin (FLOMAX) 0.4 mg capsule          Past History     Past Medical History:  Past Medical History:   Diagnosis Date    Bradycardia     Dyslipidemia     Elevated PSA     Inguinal hernia     right    Skin lesion of face     UTI (urinary tract infection)        Past Surgical History:  Past Surgical History:   Procedure Laterality Date    COLONOSCOPY N/A 8/19/2019    COLONOSCOPY performed by Major Rob MD at [facility]    COLONOSCOPY N/A 8/30/2022    COLONOSCOPY; POLYPECTOMY; SCLERO-INJECTION WITH SALINE (ASCENDING COLON) performed by Bennie Brooks MD at [facility]  8/19/2019         HX TONSILLECTOMY      HX UROLOGICAL      cysto       Family History:  Family History   Problem Relation Age of Onset    No Known Problems Mother     Alcohol abuse Father     Coronary Art Dis Neg Hx        Social History:  Social History     Tobacco Use    Smoking status: Former     Packs/day: 1.00     Years: 5.00     Pack years: 5.00     Types: Cigarettes     Quit date: 1967     Years since quittin.8    Smokeless tobacco: Never   Vaping Use    Vaping Use: Never used   Substance Use Topics    Alcohol use: Yes     Alcohol/week: 3.0 - 4.0 standard drinks     Types: 3 - 4 Shots of liquor per week     Comment: 4 oz per drink 3-4 times weekly    Drug use: Never       Allergies:  No Known Allergies      Review of Systems   Review of Systems  Constitutional: Negative for fever, chills, and fatigue. HENT: Negative for congestion, sore throat, rhinorrhea, sneezing and neck stiffness   Eyes: Negative for discharge and redness. Respiratory: Negative for  shortness of breath, wheezing   Cardiovascular: Negative for chest pain, palpitations   Gastrointestinal:positive abdominal pain. Negative for nausea, vomiting, constipation, diarrhea and blood in stool. Genitourinary: Negative for dysuria, hematuria, flank pain, decreased urine volume, discharge,   Musculoskeletal: Negative for myalgias or joint pain . Skin: Negative for rash or lesions . Neurological: Negative weakness, light-headedness, numbness and headaches. Physical Exam   Physical Exam    GENERAL: alert and oriented, no acute distress  EYES: PEERL, No injection, discharge or icterus. ENT: Mucous membranes pink and moist.  NECK: Supple  LUNGS: Airway patent. Non-labored respirations. Breath sounds clear with good air entry bilaterally. HEART: Regular rate and rhythm. No peripheral edema  ABDOMEN: suprapubic TTP, without guarding or rebound. SKIN:  warm, dry  MSK/EXTREMITIES: Without swelling, tenderness or deformity, symmetric with normal ROM  NEUROLOGICAL: Alert, oriented      Diagnostic Study Results     Labs -   No results found for this or any previous visit (from the past 12 hour(s)).     Radiologic Studies -   No orders to display     CT Results  (Last 48 hours)      None          CXR Results  (Last 48 hours)      None              Medical Decision Making     IShira MD am the first provider for this patient and am the attending of record for this patient encounter. I reviewed the vital signs, available nursing notes, past medical history, past surgical history, family history and social history. Vital Signs-Reviewed the patient's vital signs. No data found. Records Reviewed: Nursing Notes and Old Medical Records    Provider Notes (Medical Decision Making): On presentation, the patient is well appearing, in no acute distress with normal vital signs. Presenting with acute urinary retention. Sx resolve after smith placement. Urinalysis c/w uti , started on abx pending culture results. ED Course:   Initial assessment performed. The patients presenting problems have been discussed, and they are in agreement with the care plan formulated and outlined with them. I have encouraged them to ask questions as they arise throughout their visit. Medications   cefdinir (OMNICEF) capsule 300 mg (300 mg Oral Given 10/10/22 1945)         PROGRESS  Cee Polo  results have been reviewed with him. He has been counseled regarding his diagnosis. He verbally conveys understanding and agreement of the signs, symptoms, diagnosis, treatment and prognosis and additionally agrees to follow up as recommended with Dr. Sasha Leahy PA-C in 24 - 48 hours. He also agrees with the care-plan and conveys that all of his questions have been answered. I have also put together some discharge instructions for him that include: 1) educational information regarding their diagnosis, 2) how to care for their diagnosis at home, as well a 3) list of reasons why they would want to return to the ED prior to their follow-up appointment, should their condition change. Disposition:  home    PLAN:  1. Discharge Medication List as of 10/10/2022  7:42 PM        2.    Follow-up Information       Follow up With Specialties Details Why Contact Info    Sasha Leahy PA-C Physician Assistant Schedule an appointment as soon as possible for a visit in 2 days  [address] [facility]      [address] [facility]  If symptoms worsen [address] [id number] [phone]    Please call tomorrow to set up follow-up with urologist              Return to ED if worse     Diagnosis     Clinical Impression:   1. Urinary tract infection without hematuria, site unspecified    2. Urinary retention        Please note that this dictation was completed with Dragon, computer voice recognition software. Quite often unanticipated grammatical, syntax, homophones, and other interpretive errors are inadvertently transcribed by the computer software. Please disregard these errors. Additionally, please excuse any errors that have escaped final proofreading.

## 2022-11-15 ENCOUNTER — OFFICE VISIT (OUTPATIENT)
Dept: FAMILY MEDICINE CLINIC | Age: 77
End: 2022-11-15
Payer: MEDICARE

## 2022-11-15 VITALS
TEMPERATURE: 97.5 F | RESPIRATION RATE: 16 BRPM | HEIGHT: 72 IN | SYSTOLIC BLOOD PRESSURE: 128 MMHG | BODY MASS INDEX: 29.17 KG/M2 | WEIGHT: 215.4 LBS | HEART RATE: 62 BPM | DIASTOLIC BLOOD PRESSURE: 73 MMHG | OXYGEN SATURATION: 97 %

## 2022-11-15 DIAGNOSIS — R30.0 BURNING WITH URINATION: Primary | ICD-10-CM

## 2022-11-15 LAB
BILIRUB UR QL STRIP: NEGATIVE
GLUCOSE UR-MCNC: NEGATIVE MG/DL
KETONES P FAST UR STRIP-MCNC: NEGATIVE MG/DL
PH UR STRIP: 5.5 [PH] (ref 4.6–8)
PROT UR QL STRIP: NORMAL
SP GR UR STRIP: 1.02 (ref 1–1.03)
UA UROBILINOGEN AMB POC: NORMAL (ref 0.2–1)
URINALYSIS CLARITY POC: NORMAL
URINALYSIS COLOR POC: YELLOW
URINE BLOOD POC: NORMAL
URINE LEUKOCYTES POC: NORMAL
URINE NITRITES POC: NEGATIVE

## 2022-11-15 PROCEDURE — 1101F PT FALLS ASSESS-DOCD LE1/YR: CPT | Performed by: FAMILY MEDICINE

## 2022-11-15 PROCEDURE — 81002 URINALYSIS NONAUTO W/O SCOPE: CPT | Performed by: FAMILY MEDICINE

## 2022-11-15 PROCEDURE — 99213 OFFICE O/P EST LOW 20 MIN: CPT | Performed by: FAMILY MEDICINE

## 2022-11-15 PROCEDURE — G8510 SCR DEP NEG, NO PLAN REQD: HCPCS | Performed by: FAMILY MEDICINE

## 2022-11-15 PROCEDURE — 1123F ACP DISCUSS/DSCN MKR DOCD: CPT | Performed by: FAMILY MEDICINE

## 2022-11-15 PROCEDURE — G8536 NO DOC ELDER MAL SCRN: HCPCS | Performed by: FAMILY MEDICINE

## 2022-11-15 PROCEDURE — G8417 CALC BMI ABV UP PARAM F/U: HCPCS | Performed by: FAMILY MEDICINE

## 2022-11-15 PROCEDURE — G8427 DOCREV CUR MEDS BY ELIG CLIN: HCPCS | Performed by: FAMILY MEDICINE

## 2022-11-15 RX ORDER — SULFAMETHOXAZOLE AND TRIMETHOPRIM 800; 160 MG/1; MG/1
1 TABLET ORAL 2 TIMES DAILY
Qty: 10 TABLET | Refills: 0 | Status: SHIPPED | OUTPATIENT
Start: 2022-11-15 | End: 2022-11-20

## 2022-11-15 NOTE — PROGRESS NOTES
Araceli Maria is a 65 y.o. male who presents to the office today with the following:  Chief Complaint   Patient presents with    Urinary Burning     For 4 days       HPI  Had urine retention twice  Had cath twice  And self catheterization  But stopped that after second Smith    Had smith out on the 10th  Since then has had a little burning from the catheter    Review of Systems   Constitutional:  Negative for fever. Gastrointestinal:  Negative for abdominal pain, nausea and vomiting. Genitourinary:  Positive for dysuria, frequency, hematuria (one time 2d ago) and urgency. Musculoskeletal:  Negative for back pain. See HPI. Past Medical History:   Diagnosis Date    Bradycardia     Dyslipidemia     Elevated PSA     Inguinal hernia     right    Skin lesion of face     UTI (urinary tract infection)        Past Surgical History:   Procedure Laterality Date    COLONOSCOPY N/A 2019    COLONOSCOPY performed by Fernandez Natarajan MD at [facility] ENDOSCOPY    COLONOSCOPY N/A 2022    COLONOSCOPY; POLYPECTOMY; SCLERO-INJECTION WITH SALINE (ASCENDING COLON) performed by Anabel Mae MD at [facility]  2019         HX TONSILLECTOMY      HX UROLOGICAL      cysto       No Known Allergies    Current Outpatient Medications   Medication Sig    trimethoprim-sulfamethoxazole (BACTRIM DS, SEPTRA DS) 160-800 mg per tablet Take 1 Tablet by mouth two (2) times a day for 5 days. tamsulosin (FLOMAX) 0.4 mg capsule     atorvastatin (LIPITOR) 10 mg tablet Take 1 Tablet by mouth daily. (Patient not taking: No sig reported)     No current facility-administered medications for this visit.        Social History     Socioeconomic History    Marital status:    Tobacco Use    Smoking status: Former     Packs/day: 1.00     Years: 5.00     Pack years: 5.00     Types: Cigarettes     Quit date: 5     Years since quittin.9    Smokeless tobacco: Never   Vaping Use    Vaping Use: Never used Substance and Sexual Activity    Alcohol use: Yes     Alcohol/week: 3.0 - 4.0 standard drinks     Types: 3 - 4 Shots of liquor per week     Comment: 4 oz per drink 3-4 times weekly    Drug use: Never    Sexual activity: Yes       Family History   Problem Relation Age of Onset    No Known Problems Mother     Alcohol abuse Father     Coronary Art Dis Neg Hx          Physical Exam:  Visit Vitals  /73 (BP 1 Location: Left upper arm, BP Patient Position: Sitting, BP Cuff Size: Adult)   Pulse 62   Temp 97.5 °F (36.4 °C) (Temporal)   Resp 16   Ht 6' (1.829 m)   Wt 215 lb 6.4 oz (97.7 kg)   SpO2 97%   BMI 29.21 kg/m²     Physical Exam  Vitals and nursing note reviewed. HENT:      Head: Normocephalic and atraumatic. Eyes:      Extraocular Movements: Extraocular movements intact. Conjunctiva/sclera: Conjunctivae normal.   Cardiovascular:      Rate and Rhythm: Normal rate and regular rhythm. Heart sounds: Normal heart sounds. Pulmonary:      Effort: Pulmonary effort is normal.      Breath sounds: Normal breath sounds. Abdominal:      General: Abdomen is flat. There is no distension. Palpations: Abdomen is soft. Tenderness: There is no abdominal tenderness. There is no right CVA tenderness, left CVA tenderness or guarding. Skin:     General: Skin is warm and dry. Neurological:      Mental Status: He is alert and oriented to person, place, and time.    Psychiatric:         Mood and Affect: Mood normal.         Behavior: Behavior normal.       Recent Results (from the past 12 hour(s))   AMB POC URINALYSIS DIP STICK MANUAL W/O MICRO    Collection Time: 11/15/22  4:46 PM   Result Value Ref Range    Color (UA POC) Yellow     Clarity (UA POC) Cloudy     Glucose (UA POC) Negative Negative    Bilirubin (UA POC) Negative Negative    Ketones (UA POC) Negative Negative    Specific gravity (UA POC) 1.025 1.001 - 1.035    Blood (UA POC) 1+ Negative    pH (UA POC) 5.5 4.6 - 8.0    Protein (UA POC) 2+ Negative    Urobilinogen (UA POC) 0.2 mg/dL 0.2 - 1    Nitrites (UA POC) Negative Negative    Leukocyte esterase (UA POC) 2+ Negative       Assessment/Plan:    ICD-10-CM ICD-9-CM    1. Burning with urination  R30.0 788.1 AMB POC URINALYSIS DIP STICK MANUAL W/O MICRO      CULTURE, URINE      trimethoprim-sulfamethoxazole (BACTRIM DS, SEPTRA DS) 160-800 mg per tablet      CULTURE, URINE        Call if not better    Follow-up and Dispositions    Return if symptoms worsen or fail to improve.          Dino Pham MD

## 2022-11-15 NOTE — PROGRESS NOTES
1. \"Have you been to the ER, urgent care clinic since your last visit? Hospitalized since your last visit? \" No    2. \"Have you seen or consulted any other health care providers outside of the [address] [place] [address] since your last visit? \" Yes Where: Urologist      3. For patients aged 39-70: Has the patient had a colonoscopy / FIT/ Cologuard? NA - based on age      If the patient is female:    4. For patients aged 41-77: Has the patient had a mammogram within the past 2 years? NA - based on age or sex      11. For patients aged 21-65: Has the patient had a pap smear?  NA - based on age or sex

## 2022-11-19 LAB
BACTERIA SPEC CULT: ABNORMAL
BACTERIA SPEC CULT: ABNORMAL
CC UR VC: ABNORMAL
SERVICE CMNT-IMP: ABNORMAL

## 2022-11-20 RX ORDER — CIPROFLOXACIN 500 MG/1
500 TABLET ORAL 2 TIMES DAILY
Qty: 14 TABLET | Refills: 0 | Status: SHIPPED | OUTPATIENT
Start: 2022-11-20 | End: 2022-11-27

## 2022-11-22 ENCOUNTER — OFFICE VISIT (OUTPATIENT)
Dept: FAMILY MEDICINE CLINIC | Age: 77
End: 2022-11-22
Payer: MEDICARE

## 2022-11-22 VITALS
DIASTOLIC BLOOD PRESSURE: 52 MMHG | HEIGHT: 72 IN | OXYGEN SATURATION: 97 % | WEIGHT: 218 LBS | SYSTOLIC BLOOD PRESSURE: 100 MMHG | RESPIRATION RATE: 16 BRPM | HEART RATE: 69 BPM | TEMPERATURE: 96.9 F | BODY MASS INDEX: 29.53 KG/M2

## 2022-11-22 DIAGNOSIS — Z00.00 MEDICARE ANNUAL WELLNESS VISIT, SUBSEQUENT: Primary | ICD-10-CM

## 2022-11-22 DIAGNOSIS — G56.03 BILATERAL CARPAL TUNNEL SYNDROME: ICD-10-CM

## 2022-11-22 PROCEDURE — G8536 NO DOC ELDER MAL SCRN: HCPCS | Performed by: FAMILY MEDICINE

## 2022-11-22 PROCEDURE — G8510 SCR DEP NEG, NO PLAN REQD: HCPCS | Performed by: FAMILY MEDICINE

## 2022-11-22 PROCEDURE — G8417 CALC BMI ABV UP PARAM F/U: HCPCS | Performed by: FAMILY MEDICINE

## 2022-11-22 PROCEDURE — 1101F PT FALLS ASSESS-DOCD LE1/YR: CPT | Performed by: FAMILY MEDICINE

## 2022-11-22 PROCEDURE — 1123F ACP DISCUSS/DSCN MKR DOCD: CPT | Performed by: FAMILY MEDICINE

## 2022-11-22 PROCEDURE — 99213 OFFICE O/P EST LOW 20 MIN: CPT | Performed by: FAMILY MEDICINE

## 2022-11-22 PROCEDURE — G0439 PPPS, SUBSEQ VISIT: HCPCS | Performed by: FAMILY MEDICINE

## 2022-11-22 PROCEDURE — G8427 DOCREV CUR MEDS BY ELIG CLIN: HCPCS | Performed by: FAMILY MEDICINE

## 2022-11-22 NOTE — PROGRESS NOTES
11/22/2022      Chief Complaint   Patient presents with    Tingling     Thumb and Index on right hand     Numbness     Thumb and Index on right hand       Annual Wellness Visit         History of Present Illness:         is a 65 y.o. male waking in morning with numbness and tingling of both index fingers and thumbs. Minimal pain, no weakness, goes away when he gets up and moves. Happens when he sits in certain positions. No Known Allergies    Current Outpatient Medications   Medication Sig    tamsulosin (FLOMAX) 0.4 mg capsule     ciprofloxacin HCl (CIPRO) 500 mg tablet Take 1 Tablet by mouth two (2) times a day for 7 days. (Patient not taking: Reported on 11/22/2022)    atorvastatin (LIPITOR) 10 mg tablet Take 1 Tablet by mouth daily. (Patient not taking: No sig reported)     No current facility-administered medications for this visit. Physical Examination:    Visit Vitals  BP (!) 100/52 (BP 1 Location: Left upper arm, BP Patient Position: Sitting, BP Cuff Size: Adult)   Pulse 69   Temp 96.9 °F (36.1 °C) (Oral)   Resp 16   Ht 6' (1.829 m)   Wt 218 lb (98.9 kg)   SpO2 97%   BMI 29.57 kg/m²      General:  Alert, cooperative, no distress. HEENT:  Normocephalic, without obvious abnormality, atraumatic. Conjunctivae/corneas clear. Pupils equal, round, reactive to light. Extraocular movements intact. TMs and external canals normal bilaterally. Nasal mucosa and oropharynx clear. Lungs: Clear to auscultation bilaterally. Chest wall:  No tenderness or deformity. Heart:  Regular rate and rhythm, S1, S2 normal, no murmur, click, rub, or gallop. Abdomen:   Soft, non-tender. Bowel sounds normal. No masses. No organomegaly. Extremities: Extremities normal, atraumatic, no cyanosis or edema. Negative tinnels' and finklestein's tests. No thenar atrophy   Pulses: 2+ and symmetric all extremities. Skin: Skin color, texture, turgor normal. No rashes or lesions.    Lymph nodes: Cervical, supraclavicular, and axillary nodes normal.   Neurologic: CNII-XII intact. Normal strength, sensation, and reflexes throughout. ASSESSMENT AND PLAN    1. Medicare annual wellness visit, subsequent      2. Bilateral carpal tunnel syndrome  Cock up nocturnal wrist splints. Return for injection if worsening              No orders of the defined types were placed in this encounter.           Ayden Hinton MD

## 2022-11-22 NOTE — PROGRESS NOTES
1. \"Have you been to the ER, urgent care clinic since your last visit? Hospitalized since your last visit? \" No    2. \"Have you seen or consulted any other health care providers outside of the [address] [place] [address] since your last visit? \" No     3. For patients aged 39-70: Has the patient had a colonoscopy / FIT/ Cologuard? NA - based on age      If the patient is female:    4. For patients aged 41-77: Has the patient had a mammogram within the past 2 years? NA - based on age or sex      11. For patients aged 21-65: Has the patient had a pap smear? NA - based on age or sex      Functional Ability:   Does the patient exhibit a steady gait? yes   How long did it take the patient to get up and walk from a sitting position? 10 seconds   Is the patient self reliant?  (ie can do own laundry, meals, household chores)  yes     Does the patient handle his/her own medications? yes     Does the patient handle his/her own money? yes     Is the patients home safe (ie good lighting, handrails on stairs and bath, etc.)? yes     Did you notice or did patient express any hearing difficulties? Wears hearing aids      Did you notice or did patient express any vision difficulties?   no     Were distance and reading eye charts used? no       Advance Care Planning:   Patient was offered the opportunity to discuss advance care planning:  yes     Does patient have an Advance Directive:  yes   If no, did you provide information on Caring Connections?   yes     ADL Assessment 6/29/2022   Feeding yourself No Help Needed   Getting from bed to chair No Help Needed   Getting dressed No Help Needed   Bathing or showering No Help Needed   Walk across the room (includes cane/walker) No Help Needed   Using the telphone No Help Needed   Taking your medications No Help Needed   Preparing meals No Help Needed   Managing money (expenses/bills) No Help Needed   Moderately strenuous housework (laundry) No Help Needed   Shopping for personal items (toiletries/medicines) No Help Needed   Shopping for groceries No Help Needed   Driving No Help Needed   Climbing a flight of stairs No Help Needed   Getting to places beyond walking distances No Help Needed       Abuse Screening Questionnaire 6/29/2022   Do you ever feel afraid of your partner? N   Are you in a relationship with someone who physically or mentally threatens you? N   Is it safe for you to go home? Y      This is the Subsequent Medicare Annual Wellness Exam, performed 12 months or more after the Initial AWV or the last Subsequent AWV    I have reviewed the patient's medical history in detail and updated the computerized patient record. Assessment/Plan   Education and counseling provided:  Are appropriate based on today's review and evaluation    1. Medicare annual wellness visit, subsequent     Depression Risk Factor Screening     3 most recent PHQ Screens 11/22/2022   Little interest or pleasure in doing things Not at all   Feeling down, depressed, irritable, or hopeless Not at all   Total Score PHQ 2 0       Alcohol & Drug Abuse Risk Screen    Do you average more than 1 drink per night or more than 7 drinks a week: No    In the past three months have you have had more than 4 drinks containing alcohol on one occasion: No          Functional Ability and Level of Safety    Hearing: Hearing is good. The patient wears hearing aids. Activities of Daily Living: The home contains: no safety equipment. Patient does total self care      Ambulation: with no difficulty     Fall Risk:  Fall Risk Assessment, last 12 mths 11/15/2022   Able to walk? Yes   Fall in past 12 months? 0   Do you feel unsteady?  0   Are you worried about falling 0      Abuse Screen:  Patient is not abused       Cognitive Screening    Has your family/caregiver stated any concerns about your memory: no     Cognitive Screening: Normal - Clock Drawing Test    Health Maintenance Due     Health Maintenance Due   Topic Date Due Shingrix Vaccine Age 49> (1 of 2) Never done    COVID-19 Vaccine (3 - Booster for Moderna series) 04/20/2021    Flu Vaccine (1) 08/01/2022       Patient Care Team   Patient Care Team:  Galileo Bernstein as PCP - General (Physician Assistant)  Michelle Rodriguez PA-C as PCP [facility]  Pastor Chaparrita MD (Cardiovascular Disease Physician)    History     Patient Active Problem List   Diagnosis Code    Advance directive discussed with patient Z71.89    Dyslipidemia E78.5    Bradycardia R00.1    Precordial pain R07.2    Atrial arrhythmia I49.8    UTI (urinary tract infection) N39.0    CASSIE (acute kidney injury) ([facility] [address] N17.9    Septic shock ([facility] [address] A41.9, R65.21     Past Medical History:   Diagnosis Date    Bradycardia     Dyslipidemia     Elevated PSA     Inguinal hernia     right    Skin lesion of face     UTI (urinary tract infection)       Past Surgical History:   Procedure Laterality Date    COLONOSCOPY N/A 8/19/2019    COLONOSCOPY performed by Brisa Figueroa MD at hospitals ENDOSCOPY    COLONOSCOPY N/A 8/30/2022    COLONOSCOPY; POLYPECTOMY; SCLERO-INJECTION WITH SALINE (ASCENDING COLON) performed by Ny Stout MD at [address]  8/19/2019         HX TONSILLECTOMY      HX UROLOGICAL      cysto     Current Outpatient Medications   Medication Sig Dispense Refill    tamsulosin (FLOMAX) 0.4 mg capsule       ciprofloxacin HCl (CIPRO) 500 mg tablet Take 1 Tablet by mouth two (2) times a day for 7 days. (Patient not taking: Reported on 11/22/2022) 14 Tablet 0    atorvastatin (LIPITOR) 10 mg tablet Take 1 Tablet by mouth daily.  (Patient not taking: No sig reported) 30 Tablet 0     No Known Allergies    Family History   Problem Relation Age of Onset    No Known Problems Mother     Alcohol abuse Father     Coronary Art Dis Neg Hx      Social History     Tobacco Use    Smoking status: Former     Packs/day: 1.00     Years: 5.00     Pack years: 5.00     Types: Cigarettes Quit date: 5     Years since quittin.9    Smokeless tobacco: Never   Substance Use Topics    Alcohol use:  Yes     Alcohol/week: 3.0 - 4.0 standard drinks     Types: 3 - 4 Shots of liquor per week     Comment: 4 oz per drink 3-4 times weekly         Dannielle Knapp LPN

## 2022-11-22 NOTE — PATIENT INSTRUCTIONS
Medicare Wellness Visit, Male    The best way to live healthy is to have a lifestyle where you eat a well-balanced diet, exercise regularly, limit alcohol use, and quit all forms of tobacco/nicotine, if applicable. Regular preventive services are another way to keep healthy. Preventive services (vaccines, screening tests, monitoring & exams) can help personalize your care plan, which helps you manage your own care. Screening tests can find health problems at the earliest stages, when they are easiest to treat. [facility] follows the current, evidence-based guidelines published by the [facility] ([facility] [[facility]]STF) when recommending preventive services for our patients. Because we follow these guidelines, sometimes recommendations change over time as research supports it. (For example, a prostate screening blood test is no longer routinely recommended for men with no symptoms). Of course, you and your doctor may decide to screen more often for some diseases, based on your risk and co-morbidities (chronic disease you are already diagnosed with). Preventive services for you include:  - Medicare offers their members a free annual wellness visit, which is time for you and your primary care provider to discuss and plan for your preventive service needs. Take advantage of this benefit every year!  -All adults over age 72 should receive the recommended pneumonia vaccines. Current USPSTF guidelines recommend a series of two vaccines for the best pneumonia protection.   -All adults should have a flu vaccine yearly and tetanus vaccine every 10 years.  -All adults age 48 and older should receive the shingles vaccines (series of two vaccines).        -All adults age 38-68 who are overweight should have a diabetes screening test once every three years.   -Other screening tests & preventive services for persons with diabetes include: an eye exam to screen for diabetic retinopathy, a kidney function test, a foot exam, and stricter control over your cholesterol.   -Cardiovascular screening for adults with routine risk involves an electrocardiogram (ECG) at intervals determined by the provider.   -Colorectal cancer screening should be done for adults age 54-65 with no increased risk factors for colorectal cancer. There are a number of acceptable methods of screening for this type of cancer. Each test has its own benefits and drawbacks. Discuss with your provider what is most appropriate for you during your annual wellness visit. The different tests include: colonoscopy (considered the best screening method), a fecal occult blood test, a fecal DNA test, and sigmoidoscopy.  -All adults born between [facility] should be screened once for Hepatitis C.  -An Abdominal Aortic Aneurysm (AAA) Screening is recommended for men age 73-68 who has ever smoked in their lifetime.      Here is a list of your current Health Maintenance items (your personalized list of preventive services) with a due date:  Health Maintenance Due   Topic Date Due    Shingles Vaccine (1 of 2) Never done    COVID-19 Vaccine (3 - Booster for [place] In[employer] series) 04/20/2021    Yearly Flu Vaccine (1) 08/01/2022

## 2022-11-23 NOTE — PROGRESS NOTES
I have called and left a detailed message for this patient.   Per Dr Hadley Cortes:  Call pt, the Urine Cx is positive, the Abx is not helping  I am calling in another Rx

## 2022-11-25 ENCOUNTER — TELEPHONE (OUTPATIENT)
Dept: FAMILY MEDICINE CLINIC | Age: 77
End: 2022-11-25

## 2022-11-25 NOTE — TELEPHONE ENCOUNTER
Pt came into office to state that someone called him to say that he needed a different medication. I see the note where you told him you would [facility], however I do not see the new med was sent there. Please call pt at [phone] when sent. thanks.

## 2022-11-25 NOTE — TELEPHONE ENCOUNTER
I have called and talked to this patient. The new ABX was called in on the 20th of November, but I was not able to call him until the 23rd. By this time he already had the ABX and thought I was telling him that another new one was called in. I have apologized for the inconvenience. Patient verbalizes understanding.

## 2022-12-12 ENCOUNTER — TELEPHONE (OUTPATIENT)
Dept: FAMILY MEDICINE CLINIC | Age: 77
End: 2022-12-12

## 2022-12-12 ENCOUNTER — CLINICAL SUPPORT (OUTPATIENT)
Dept: FAMILY MEDICINE CLINIC | Age: 77
End: 2022-12-12
Payer: MEDICARE

## 2022-12-12 DIAGNOSIS — R30.0 BURNING WITH URINATION: Primary | ICD-10-CM

## 2022-12-12 LAB
BILIRUB UR QL STRIP: NEGATIVE
GLUCOSE UR-MCNC: NEGATIVE MG/DL
KETONES P FAST UR STRIP-MCNC: NEGATIVE MG/DL
PH UR STRIP: 8.5 [PH] (ref 4.6–8)
PROT UR QL STRIP: ABNORMAL
SP GR UR STRIP: 1.02 (ref 1–1.03)
UA UROBILINOGEN AMB POC: ABNORMAL (ref 0.2–1)
URINALYSIS CLARITY POC: ABNORMAL
URINALYSIS COLOR POC: YELLOW
URINE BLOOD POC: NEGATIVE
URINE LEUKOCYTES POC: NEGATIVE
URINE NITRITES POC: NEGATIVE

## 2022-12-12 PROCEDURE — 81003 URINALYSIS AUTO W/O SCOPE: CPT | Performed by: FAMILY MEDICINE

## 2022-12-12 NOTE — TELEPHONE ENCOUNTER
----- Message from Eileen Palacios sent at 12/9/2022  3:32 PM EST -----  Subject: Message to Provider    QUESTIONS  Information for Provider? Patient called in requesting to drop off a urine   sample to make sure the antibiotics worked for his UTI. Please contact the   patient as he would prefer to drop it off today. Thanks!  ---------------------------------------------------------------------------  --------------  Kp Staton INFO  [phone]; OK to leave message on voicemail  ---------------------------------------------------------------------------  --------------  SCRIPT ANSWERS  Relationship to Patient?  Self

## 2023-02-24 ENCOUNTER — OFFICE VISIT (OUTPATIENT)
Dept: FAMILY MEDICINE CLINIC | Age: 78
End: 2023-02-24
Payer: MEDICARE

## 2023-02-24 VITALS
OXYGEN SATURATION: 93 % | HEART RATE: 64 BPM | DIASTOLIC BLOOD PRESSURE: 57 MMHG | BODY MASS INDEX: 29.26 KG/M2 | SYSTOLIC BLOOD PRESSURE: 95 MMHG | TEMPERATURE: 97 F | RESPIRATION RATE: 16 BRPM | HEIGHT: 72 IN | WEIGHT: 216 LBS

## 2023-02-24 DIAGNOSIS — G56.23 CUBITAL TUNNEL SYNDROME, BILATERAL: Primary | ICD-10-CM

## 2023-02-24 PROCEDURE — G8510 SCR DEP NEG, NO PLAN REQD: HCPCS | Performed by: FAMILY MEDICINE

## 2023-02-24 PROCEDURE — 1123F ACP DISCUSS/DSCN MKR DOCD: CPT | Performed by: FAMILY MEDICINE

## 2023-02-24 PROCEDURE — G8427 DOCREV CUR MEDS BY ELIG CLIN: HCPCS | Performed by: FAMILY MEDICINE

## 2023-02-24 PROCEDURE — G8536 NO DOC ELDER MAL SCRN: HCPCS | Performed by: FAMILY MEDICINE

## 2023-02-24 PROCEDURE — 1101F PT FALLS ASSESS-DOCD LE1/YR: CPT | Performed by: FAMILY MEDICINE

## 2023-02-24 PROCEDURE — 99213 OFFICE O/P EST LOW 20 MIN: CPT | Performed by: FAMILY MEDICINE

## 2023-02-24 PROCEDURE — G8417 CALC BMI ABV UP PARAM F/U: HCPCS | Performed by: FAMILY MEDICINE

## 2023-02-24 NOTE — PROGRESS NOTES
1. \"Have you been to the ER, urgent care clinic since your last visit? Hospitalized since your last visit? \" No    2. \"Have you seen or consulted any other health care providers outside of the [address] [place] [address] since your last visit? \" No     3. For patients aged 39-70: Has the patient had a colonoscopy / FIT/ Cologuard? NA - based on age      If the patient is female:    4. For patients aged 41-77: Has the patient had a mammogram within the past 2 years? NA - based on age or sex      11. For patients aged 21-65: Has the patient had a pap smear? NA - based on age or sex    2/24/2023      Chief Complaint   Patient presents with    Numbness     Fingers on Bilateral hands     Tingling     Fingers on Bilateral hands          History of Present Illness:         is a 65 y.o. male returns complaining of numbness of hand and whole arm on both sides which is positional, mainly when sleeping or sitting with arm on armrest or with arms crossed. Can last up to five minutes in morning, but always resolves with change of position. No elbow or neck injuries, no neck pain or arm motor weakness. No Known Allergies    Current Outpatient Medications   Medication Sig    tamsulosin (FLOMAX) 0.4 mg capsule     atorvastatin (LIPITOR) 10 mg tablet Take 1 Tablet by mouth daily. (Patient not taking: No sig reported)     No current facility-administered medications for this visit. Physical Examination:    Visit Vitals  BP (!) 95/57 (BP 1 Location: Left upper arm, BP Patient Position: Sitting, BP Cuff Size: Adult)   Pulse 64   Temp 97 °F (36.1 °C) (Oral)   Resp 16   Ht 6' (1.829 m)   Wt 216 lb (98 kg)   SpO2 93%   BMI 29.29 kg/m²      General:  Alert, cooperative, no distress. HEENT:  Normocephalic, without obvious abnormality, atraumatic. Conjunctivae/corneas clear. Pupils equal, round, reactive to light. Extraocular movements intact. TMs and external canals normal bilaterally. Nasal mucosa and oropharynx clear. Lungs: Clear to auscultation bilaterally. Chest wall:  No tenderness or deformity. Heart:  Regular rate and rhythm, S1, S2 normal, no murmur, click, rub, or gallop. Abdomen:   Soft, non-tender. Bowel sounds normal. No masses. No organomegaly. Extremities: Extremities normal, atraumatic, no cyanosis or edema. Pulses: 2+ and symmetric all extremities. Skin: Skin color, texture, turgor normal. No rashes or lesions. Lymph nodes: Cervical, supraclavicular, and axillary nodes normal.   Neurologic: CNII-XII intact. Normal strength, sensation, and reflexes throughout. ASSESSMENT AND PLAN    1.  Cubital tunnel syndrome, bilateral    - REFERRAL TO ORTHOPEDICS              Orders Placed This Encounter    REFERRAL TO ORTHOPEDICS     Referral Priority:   Routine     Referral Type:   Consultation     Referral Reason:   Specialty Services Required     Referred to Provider:   Jennie Montes MD     Number of Visits Requested:   1           Tara Newton MD

## 2023-04-12 NOTE — PERIOP NOTES
[address] [place] [address]  SURGICAL PRE-ADMISSION INSTRUCTIONS    ARRIVAL  You will be called the day before your surgery with your expected arrival time. Sign in at the  of the hospital.  You will be directed to the Surgical Waiting Room. Please arrive at your scheduled appointment time. You have been scheduled to arrive for your procedure one or two hours prior to the expected start time of your procedure. Every effort will be made to minimize your wait but please be aware that unforeseen circumstances may affect our schedule. EATING  DO NOT EAT OR DRINK ANYTHING AFTER MIDNIGHT ON THE EVENING BEFORE YOUR SURGERY OR ON THE DAY OF YOUR SURGERY except for your medications (as instructed) with a sip of water. Do not use gum, mints or lozenges on the morning of your surgery. Please do not smoke or chew tobacco before your surgery. MEDICATIONS   Take the following medications on the morning of your surgery with the smallest amount of water possible : Keflex    STOP THESE MEDICATIONS AT THE TIMES LISTED BELOW  NONE     DRIVING/TRANSPORATION  Have a responsible adult to drive you home from the hospital and to stay with you over night. Please have them plan to remain in the hospital during your surgery. Your surgery will not be done if you do not have a responsible adult to take you home and to stay with you. If you have arranged for public transport, you must have a responsible adult to ride with you (who is not the ). You may not drive for 24 hours after anesthesia. PREPARATION  If you have a Living WiIl/Advance Directive, please bring a copy with you to scan into your chart. Please DO NOT wear makeup or nail polish  Please leave valuables at home,  DO NOT wear jewelry. Wear loose, comfortable clothing that is large enough to cover a bulky dressing. SPECIAL INSTRUCTIONS:  Shower with the Preoperative Skin Preparation CHLORHEXIDINE as instructed.     Reviewed above preoperative instructions and answered questions by phone interview    Patient:  Estela Roshan   Date:     April 12, 2023  Time:   1:05 PM    RN:  August Osman RN    Date:     April 12, 2023  Time:   1:05 PM

## 2023-04-13 ENCOUNTER — HOSPITAL ENCOUNTER (OUTPATIENT)
Age: 78
Setting detail: OUTPATIENT SURGERY
Discharge: HOME | End: 2023-04-13
Attending: ORTHOPAEDIC SURGERY | Admitting: ORTHOPAEDIC SURGERY
Payer: MEDICARE

## 2023-04-13 VITALS
TEMPERATURE: 97.5 F | SYSTOLIC BLOOD PRESSURE: 106 MMHG | HEART RATE: 57 BPM | BODY MASS INDEX: 29.26 KG/M2 | HEIGHT: 72 IN | WEIGHT: 216 LBS | RESPIRATION RATE: 16 BRPM | DIASTOLIC BLOOD PRESSURE: 57 MMHG | OXYGEN SATURATION: 96 %

## 2023-04-13 DIAGNOSIS — S66.292A OTHER SPECIFIED INJURY OF EXTENSOR MUSCLE, FASCIA AND TENDON OF LEFT THUMB AT WRIST AND HAND LEVEL, INITIAL ENCOUNTER: Primary | ICD-10-CM

## 2023-04-13 PROCEDURE — 76060000032 HC ANESTHESIA 0.5 TO 1 HR: Performed by: ORTHOPAEDIC SURGERY

## 2023-04-13 PROCEDURE — 77030002916 HC SUT ETHLN J&J -A: Performed by: ORTHOPAEDIC SURGERY

## 2023-04-13 PROCEDURE — 74011000258 HC RX REV CODE- 258: Performed by: ORTHOPAEDIC SURGERY

## 2023-04-13 PROCEDURE — 77030031139 HC SUT VCRL2 J&J -A: Performed by: ORTHOPAEDIC SURGERY

## 2023-04-13 PROCEDURE — 77030002996 HC SUT SLK J&J -A: Performed by: ORTHOPAEDIC SURGERY

## 2023-04-13 PROCEDURE — 76210000020 HC REC RM PH II FIRST 0.5 HR: Performed by: ORTHOPAEDIC SURGERY

## 2023-04-13 PROCEDURE — 74011000250 HC RX REV CODE- 250: Performed by: ORTHOPAEDIC SURGERY

## 2023-04-13 PROCEDURE — 77030000032 HC CUF TRNQT ZIMM -B: Performed by: ORTHOPAEDIC SURGERY

## 2023-04-13 PROCEDURE — 76010000138 HC OR TIME 0.5 TO 1 HR: Performed by: ORTHOPAEDIC SURGERY

## 2023-04-13 PROCEDURE — 2709999900 HC NON-CHARGEABLE SUPPLY: Performed by: ORTHOPAEDIC SURGERY

## 2023-04-13 PROCEDURE — 74011250636 HC RX REV CODE- 250/636: Performed by: ORTHOPAEDIC SURGERY

## 2023-04-13 RX ORDER — BUPIVACAINE HYDROCHLORIDE 5 MG/ML
10 INJECTION, SOLUTION EPIDURAL; INTRACAUDAL ONCE
Status: COMPLETED | OUTPATIENT
Start: 2023-04-13 | End: 2023-04-13

## 2023-04-13 RX ORDER — BUPIVACAINE HYDROCHLORIDE 5 MG/ML
INJECTION, SOLUTION EPIDURAL; INTRACAUDAL AS NEEDED
Status: DISCONTINUED | OUTPATIENT
Start: 2023-04-13 | End: 2023-04-13 | Stop reason: HOSPADM

## 2023-04-13 RX ORDER — BUPIVACAINE HYDROCHLORIDE 5 MG/ML
INJECTION, SOLUTION EPIDURAL; INTRACAUDAL
Status: DISCONTINUED
Start: 2023-04-13 | End: 2023-04-13 | Stop reason: HOSPADM

## 2023-04-13 RX ORDER — ONDANSETRON 2 MG/ML
4 INJECTION INTRAMUSCULAR; INTRAVENOUS
Status: DISCONTINUED | OUTPATIENT
Start: 2023-04-13 | End: 2023-04-13 | Stop reason: HOSPADM

## 2023-04-13 RX ORDER — HYDROCODONE BITARTRATE AND ACETAMINOPHEN 5; 325 MG/1; MG/1
1 TABLET ORAL
Qty: 8 TABLET | Refills: 0 | Status: SHIPPED | OUTPATIENT
Start: 2023-04-13 | End: 2023-04-16

## 2023-04-13 RX ORDER — SODIUM CHLORIDE, SODIUM LACTATE, POTASSIUM CHLORIDE, CALCIUM CHLORIDE 600; 310; 30; 20 MG/100ML; MG/100ML; MG/100ML; MG/100ML
25 INJECTION, SOLUTION INTRAVENOUS CONTINUOUS
Status: DISCONTINUED | OUTPATIENT
Start: 2023-04-13 | End: 2023-04-13 | Stop reason: HOSPADM

## 2023-04-13 RX ORDER — FENTANYL CITRATE 50 UG/ML
25 INJECTION, SOLUTION INTRAMUSCULAR; INTRAVENOUS AS NEEDED
Status: DISCONTINUED | OUTPATIENT
Start: 2023-04-13 | End: 2023-04-13 | Stop reason: HOSPADM

## 2023-04-13 RX ORDER — SODIUM CHLORIDE, SODIUM LACTATE, POTASSIUM CHLORIDE, CALCIUM CHLORIDE 600; 310; 30; 20 MG/100ML; MG/100ML; MG/100ML; MG/100ML
100 INJECTION, SOLUTION INTRAVENOUS CONTINUOUS
Status: DISCONTINUED | OUTPATIENT
Start: 2023-04-13 | End: 2023-04-13 | Stop reason: HOSPADM

## 2023-04-13 RX ADMIN — SODIUM CHLORIDE, POTASSIUM CHLORIDE, SODIUM LACTATE AND CALCIUM CHLORIDE 100 ML/HR: 600; 310; 30; 20 INJECTION, SOLUTION INTRAVENOUS at 10:40

## 2023-04-13 RX ADMIN — CEFAZOLIN 2 G: 2 INJECTION, POWDER, FOR SOLUTION INTRAMUSCULAR; INTRAVENOUS at 11:26

## 2023-04-13 NOTE — BRIEF OP NOTE
Brief Postoperative Note    Patient: Venkata Onofre  YOB: 1945  MRN: [id number]    Date of Procedure: 4/13/2023     Pre-Op Diagnosis: Laceration of extensor muscle, fascia, and tendon of left thumb at wrist and hand level [S66.222A]    Post-Op Diagnosis: Same as preoperative diagnosis.       Procedure(s):  REPAIR OF EXTENSOR POLLICIS LONGUS TENDON LEFT THUMB    Surgeon(s):  Dilan Coleman MD    Surgical Assistant: None    Anesthesia: General     Estimated Blood Loss (mL): Minimal    Complications: None    Specimens: * No specimens in log *     Implants: * No implants in log *    Drains: * No LDAs found *    Findings: laceration of extensor tendon just proximal to MPJ    Electronically Signed by Emily Moody MD on 4/13/2023 at 12:28 PM

## 2023-04-13 NOTE — DISCHARGE INSTRUCTIONS
Upper Extremity Surgery  Discharge Instructions  Brenda Meehan M.D. Patient Name  Estela Islas  Date of procedure  4/13/2023    Procedure  Procedure(s):  REPAIR OF EXTENSOR POLLICIS LONGUS TENDON LEFT THUMB  Surgeon  Surgeon(s) and Role:     * Jasvir Perea MD - Primary  Date of discharge: [unfilled]  PCP: @PCP@    Please take the time to review the following instructions before you leave the hospital/surgery center and use them as guidelines during your recovery from surgery. If you have any questions, you may contact my office at [phone]. Follow only those instructions marked by a \"Y\"  below. Wound Care/Dressing Changes      __Y___ Do not remove your dressings or get them wet.    ----Y---- You may loosen the ace wrap if the dressing is applying uncomfortable pressure. Showering/Bathing    __Y___ Don't remove your dressings or get them wet until you are seen at your follow-up appointment with Dr. Bob Scott. __Y___ Don't get your incisions wet until the sutures are removed. After the sutures are removed, you may shower but do not soak the incision under water for at least 3 weeks after surgery. Exercises:    __Y___ May move your fingers as much as  tolerated. Avoid lifting of any objects heavier than a coffee cup. Ice/Elevation:    ___Y__ Continue ice consistently for the next 48 hours as needed for pain and swelling. You should ice your shoulder at least three times each day for one week in cycles of ice on for twenty minutes, ice off for twenty minutes. Repeat for a total of two hours each time. Diet:    __Y___ Christi Pals may advance to your regular diet as tolerated. Increase your clear liquid intake for the next 2 to 3 days. Medication:    __Y___ Christi Pals will be given a prescription pain medicine when you are discharged from the hospital/surgery center. Take the medication on an as-needed basis according to the directions on the prescription bottle.   Possible side effects of this medication include dizziness, headache, nausea, vomitting, constipation, and urinary retention. If you experience any of these side effects, call the office so that we may assist you in relieving them. Stop the use of pain medications if you develop excessive itching, a rash, shortness of breath, or difficulty swallowing. If these symptoms are severe or are not relieved by stopping the medication, you should seek immediate medical attention. Refills of pain medication are authorized during office hours ONLY (8am-5pm, M-F). __Y___ Sergey Acevedo may resume the medications you were taking prior to surgery. Pain medication may potentiate the effects of any anti-depressant medication you are taking. If you have any questions about possible interactions between your regular medications and the pain medication, you should consult your medical doctor who prescribes your regular medications. __Y___ Sergey Acevedo may use Ibuprofen 800mg three to four times a day for 2-3 days after surgery. This can be used along with your pain medication. Follow-up with Dr. Rosita Anderson in _______2 WEEKS________________________.    ______________________________________________________________________    Anesthesia Discharge Instructions    After general anesthesia or intervenous sedation, for 24 hours or while taking prescription Narcotics:  Limit your activities  Do not drive or operate hazardous machinery  If you have not urinated within 8 hours after discharge, please contact your surgeon on call.   Do not make important personal or business decisions  Do not drink alcoholic beverages    Report the following to your surgeon:  Excessive pain, swelling, redness or odor of or around the surgical area  Temperature over 100.5 degrees  Nausea and vomiting lasting longer than 4 hours or if unable to take medication  Any signs of decreased circulation or nerve impairment to extremity:  Change in color, persistent numbness, tingling, coldness or increased pain.   Any questions

## 2023-04-13 NOTE — OP NOTES
[facility]  OPERATIVE REPORT    Name:  Kaylynn Deras  MR#:  [id number]  :  1945  ACCOUNT #:  [de-identified]  DATE OF SERVICE:  2023      PREOPERATIVE DIAGNOSIS:  Laceration of extensor tendon, left nondominant thumb. POSTOPERATIVE DIAGNOSIS:  Laceration of extensor tendon, left nondominant thumb. PROCEDURE PERFORMED:  Open repair of extensor tendon, left thumb with direct repair. SURGEON:  Debbie Reilly MD    ASSISTANT:  None. ANESTHESIA:  Local MAC. COMPLICATIONS:  None. SPECIMENS REMOVED:  None. IMPLANTS:  A 3-0 Vicryl suture. ESTIMATED BLOOD LOSS:  Negligible under tourniquet control. PROCEDURE IN DETAIL:  The patient was evaluated in outpt surgery area. The left arm marked as surgical site and verified with the patient. He had transverse laceration over the dorsal aspect of the thumb just proximal to the MP joint. There was diminished extensor tendon function of the interphalangeal joint consistent with complete laceration. At this point, the patient was taken to the operating room and given a gram of Ancef preoperatively. He was given MAC anesthesia and local was placed in the wound after time-out. Digital block was carried out. At this point, the hand was prepped and draped in usual sterile fashion. Time-out was called verifying the patient's site of surgery, surgical procedure, and preoperative antibiotics. Using the previous incision, this was extended along the ulnar side distally and radial side proximally. It was an oblique laceration. This allowed the skin edges to be sutured over to assist with exposure of the tendon laceration. At this point, the distal portion of the tendon laceration was easily identified. The proximal portion appeared to be retracted. A separate incision was made more proximally to help identify the extensor tendons.   Once this was done, we could follow hemostat and identify where the tendon retraction had occurred. The tendon end was grasped and cleaned of debris. It appeared to be that the EPL tendon along with the EPB tendon was identified. This was brought distally and held with a single suture. Using modified Jeronimo stitch, a suture was passed through each tendon ends and tied. This gave good approximation of the tendon. At this point, running 3-0 Vicryl suture was placed in the tendon and tied reinforcing this side-to-side repair while maintaining the thumb in extension. At this point, the incisions were irrigated. There was no contamination within the wound. The skin was loosely closed with 3-0 nylon suture. Sterile dressing and Xeroform was applied. He was placed in a thumb spica splint with the thumb maintained in extension. Tourniquet was released for a tourniquet time of 35 minutes. The patient returned to recovery room in satisfactory condition.       MD LUCILA [id number]  D:  04/13/2023 12:35  T:  04/13/2023 14:17  JOB #:  [id number]

## 2023-04-13 NOTE — H&P
History and Physical    Patient: Venkata Onofre MRN: [id number]  SSN: [id number]    YOB: 1945  Age: 65 y.o. Sex: male      Subjective:      Venkata Onofre is a 65 y.o. male who  was sharpening a knife 2 days ago and injured his left non dominant hand with a laceration over the dorsal aspect of the thumb around the MP joint. He has loss of IP joint extension consistent with injury to the EPL tendon. He is here for exploration of the laceration and repair of extensor tendon left hand. The risks benefits and options of surgical treatment have been discussed with the patient. He understands he will need splinting for approximately 3 to 6 weeks postoperatively depending on the strength of the repair. He understands that it will take about 6-8 weeks to rehab from this injury. .     Past Medical History:   Diagnosis Date    Bradycardia     Dyslipidemia     Elevated PSA     Inguinal hernia     right    Skin lesion of face     UTI (urinary tract infection)      Past Surgical History:   Procedure Laterality Date    COLONOSCOPY N/A 2019    COLONOSCOPY performed by Yolie Jacobsen MD at hospitals ENDOSCOPY    COLONOSCOPY N/A 2022    COLONOSCOPY; POLYPECTOMY; SCLERO-INJECTION WITH SALINE (ASCENDING COLON) performed by Sheryle Quin, MD at [facility]  2019         HX TONSILLECTOMY      HX UROLOGICAL      cysto      Family History   Problem Relation Age of Onset    No Known Problems Mother     Alcohol abuse Father     Coronary Art Dis Neg Hx      Social History     Tobacco Use    Smoking status: Former     Packs/day: 1.00     Years: 5.00     Pack years: 5.00     Types: Cigarettes     Quit date: 5     Years since quittin.3    Smokeless tobacco: Never   Substance Use Topics    Alcohol use:  Yes     Alcohol/week: 3.0 - 4.0 standard drinks     Types: 3 - 4 Shots of liquor per week     Comment: 4 oz per drink 3-4 times weekly      Prior to Admission medications    Medication Sig Start Date End Date Taking? Authorizing Provider   cephALEXin (Keflex) 250 mg capsule Take 1 Capsule by mouth three (3) times daily for 7 days. 4/11/23 4/18/23 Yes Kwasi Seay MD   tamsulosin [facility]) 0.4 mg capsule  6/15/22  Yes Provider, Historical        No Known Allergies    Review of Systems:  A comprehensive review of systems was negative. Objective: There were no vitals filed for this visit. Physical Exam:  GENERAL: alert, cooperative, no distress, appears stated age  LUNG: clear to auscultation bilaterally  HEART: regular rate and rhythm, S1, S2 normal, no murmur, click, rub or gallop  ABDOMEN: soft, non-tender. Bowel sounds normal. No masses,  no organomegaly  EXTREMITIES:  extremities normal, atraumatic, no cyanosis or edema  SKIN: Normal.  NEUROLOGIC: negative    Assessment:     Hospital Problems  Date Reviewed: 4/13/2023            Codes Class Noted POA    * (Principal) Other specified injury of extensor muscle, fascia and tendon of left thumb at wrist and hand level, initial encounter ICD-10-CM: F48.967V  ICD-9-CM: VUQ6288  4/13/2023 Yes           Plan:       Open exploration and repair of extensor tendons left thumb.     Signed By: Paula Cole MD     April 13, 2023

## 2023-04-19 ENCOUNTER — LAB ONLY (OUTPATIENT)
Dept: FAMILY MEDICINE CLINIC | Age: 78
End: 2023-04-19
Payer: MEDICARE

## 2023-04-19 DIAGNOSIS — N40.1 BENIGN PROSTATIC HYPERPLASIA WITH LOWER URINARY TRACT SYMPTOMS, SYMPTOM DETAILS UNSPECIFIED: Primary | ICD-10-CM

## 2023-04-19 LAB — PSA SERPL-MCNC: 6.2 NG/ML (ref 0.01–4)

## 2023-04-19 PROCEDURE — 36415 COLL VENOUS BLD VENIPUNCTURE: CPT | Performed by: PHYSICIAN ASSISTANT

## 2023-04-19 NOTE — PROGRESS NOTES
Patient presents for lab draw ordered by:    Ordering Provider:  Dr Justina Gamboa Department/Practice:  [facility] [place]  Phone:  [phone]  Fax:  [phone]   Date Ordered:  1/17/23    The following labs were drawn and sent to [facility] lab by Michelle Cornejo RN:    PSA    The following tubes were sent:    Gold  ( 1)    Successful venipuncture in patient's Left AC X 1 sticks. The patient tolerated this procedure w/o c/o pain or discomfort.

## 2023-05-25 RX ORDER — TAMSULOSIN HYDROCHLORIDE 0.4 MG/1
0.4 CAPSULE ORAL DAILY
COMMUNITY
Start: 2022-06-15

## 2023-06-27 ENCOUNTER — ANESTHESIA EVENT (OUTPATIENT)
Facility: HOSPITAL | Age: 78
End: 2023-06-27
Payer: MEDICARE

## 2023-06-29 ENCOUNTER — ANESTHESIA (OUTPATIENT)
Facility: HOSPITAL | Age: 78
End: 2023-06-29
Payer: MEDICARE

## 2023-07-06 ENCOUNTER — HOSPITAL ENCOUNTER (OUTPATIENT)
Facility: HOSPITAL | Age: 78
Setting detail: OUTPATIENT SURGERY
Discharge: HOME | End: 2023-07-06
Attending: UROLOGY | Admitting: UROLOGY
Payer: MEDICARE

## 2023-07-06 VITALS
BODY MASS INDEX: 29.64 KG/M2 | HEART RATE: 52 BPM | SYSTOLIC BLOOD PRESSURE: 116 MMHG | RESPIRATION RATE: 16 BRPM | TEMPERATURE: 96.4 F | OXYGEN SATURATION: 96 % | DIASTOLIC BLOOD PRESSURE: 56 MMHG | WEIGHT: 218.8 LBS | HEIGHT: 72 IN

## 2023-07-06 PROCEDURE — 7100000000 HC PACU RECOVERY - FIRST 15 MIN: Performed by: UROLOGY

## 2023-07-06 PROCEDURE — 3600000002 HC SURGERY LEVEL 2 BASE: Performed by: UROLOGY

## 2023-07-06 PROCEDURE — 2720000010 HC SURG SUPPLY STERILE: Performed by: UROLOGY

## 2023-07-06 PROCEDURE — C1758 CATHETER, URETERAL: HCPCS | Performed by: UROLOGY

## 2023-07-06 PROCEDURE — 2500000003 HC RX 250 WO HCPCS: Performed by: NURSE ANESTHETIST, CERTIFIED REGISTERED

## 2023-07-06 PROCEDURE — 6360000002 HC RX W HCPCS: Performed by: UROLOGY

## 2023-07-06 PROCEDURE — 2580000003 HC RX 258: Performed by: UROLOGY

## 2023-07-06 PROCEDURE — 7100000011 HC PHASE II RECOVERY - ADDTL 15 MIN: Performed by: UROLOGY

## 2023-07-06 PROCEDURE — 7100000001 HC PACU RECOVERY - ADDTL 15 MIN: Performed by: UROLOGY

## 2023-07-06 PROCEDURE — C1769 GUIDE WIRE: HCPCS | Performed by: UROLOGY

## 2023-07-06 PROCEDURE — 3700000000 HC ANESTHESIA ATTENDED CARE: Performed by: UROLOGY

## 2023-07-06 PROCEDURE — 2709999900 HC NON-CHARGEABLE SUPPLY: Performed by: UROLOGY

## 2023-07-06 PROCEDURE — 7100000010 HC PHASE II RECOVERY - FIRST 15 MIN: Performed by: UROLOGY

## 2023-07-06 PROCEDURE — 6360000002 HC RX W HCPCS: Performed by: NURSE ANESTHETIST, CERTIFIED REGISTERED

## 2023-07-06 PROCEDURE — 3700000001 HC ADD 15 MINUTES (ANESTHESIA): Performed by: UROLOGY

## 2023-07-06 PROCEDURE — 3600000012 HC SURGERY LEVEL 2 ADDTL 15MIN: Performed by: UROLOGY

## 2023-07-06 RX ORDER — LABETALOL HYDROCHLORIDE 5 MG/ML
10 INJECTION, SOLUTION INTRAVENOUS
Status: DISCONTINUED | OUTPATIENT
Start: 2023-07-06 | End: 2023-07-06 | Stop reason: HOSPADM

## 2023-07-06 RX ORDER — SODIUM CHLORIDE 0.9 % (FLUSH) 0.9 %
5-40 SYRINGE (ML) INJECTION PRN
Status: DISCONTINUED | OUTPATIENT
Start: 2023-07-06 | End: 2023-07-06 | Stop reason: HOSPADM

## 2023-07-06 RX ORDER — SODIUM CHLORIDE, SODIUM LACTATE, POTASSIUM CHLORIDE, CALCIUM CHLORIDE 600; 310; 30; 20 MG/100ML; MG/100ML; MG/100ML; MG/100ML
INJECTION, SOLUTION INTRAVENOUS CONTINUOUS
Status: DISCONTINUED | OUTPATIENT
Start: 2023-07-06 | End: 2023-07-06 | Stop reason: HOSPADM

## 2023-07-06 RX ORDER — SODIUM CHLORIDE 0.9 % (FLUSH) 0.9 %
5-40 SYRINGE (ML) INJECTION EVERY 12 HOURS SCHEDULED
Status: DISCONTINUED | OUTPATIENT
Start: 2023-07-06 | End: 2023-07-06 | Stop reason: HOSPADM

## 2023-07-06 RX ORDER — PROPOFOL 10 MG/ML
INJECTION, EMULSION INTRAVENOUS PRN
Status: DISCONTINUED | OUTPATIENT
Start: 2023-07-06 | End: 2023-07-06 | Stop reason: SDUPTHER

## 2023-07-06 RX ORDER — LIDOCAINE HYDROCHLORIDE 20 MG/ML
INJECTION, SOLUTION EPIDURAL; INFILTRATION; INTRACAUDAL; PERINEURAL PRN
Status: DISCONTINUED | OUTPATIENT
Start: 2023-07-06 | End: 2023-07-06 | Stop reason: SDUPTHER

## 2023-07-06 RX ORDER — FENTANYL CITRATE 50 UG/ML
INJECTION, SOLUTION INTRAMUSCULAR; INTRAVENOUS PRN
Status: DISCONTINUED | OUTPATIENT
Start: 2023-07-06 | End: 2023-07-06 | Stop reason: SDUPTHER

## 2023-07-06 RX ORDER — ONDANSETRON 2 MG/ML
4 INJECTION INTRAMUSCULAR; INTRAVENOUS
Status: DISCONTINUED | OUTPATIENT
Start: 2023-07-06 | End: 2023-07-06 | Stop reason: HOSPADM

## 2023-07-06 RX ORDER — LEVOFLOXACIN 5 MG/ML
500 INJECTION, SOLUTION INTRAVENOUS
Status: COMPLETED | OUTPATIENT
Start: 2023-07-06 | End: 2023-07-06

## 2023-07-06 RX ORDER — FENTANYL CITRATE 50 UG/ML
25 INJECTION, SOLUTION INTRAMUSCULAR; INTRAVENOUS EVERY 5 MIN PRN
Status: DISCONTINUED | OUTPATIENT
Start: 2023-07-06 | End: 2023-07-06 | Stop reason: HOSPADM

## 2023-07-06 RX ORDER — HYDROMORPHONE HYDROCHLORIDE 1 MG/ML
0.5 INJECTION, SOLUTION INTRAMUSCULAR; INTRAVENOUS; SUBCUTANEOUS EVERY 5 MIN PRN
Status: DISCONTINUED | OUTPATIENT
Start: 2023-07-06 | End: 2023-07-06 | Stop reason: HOSPADM

## 2023-07-06 RX ORDER — DROPERIDOL 2.5 MG/ML
0.62 INJECTION, SOLUTION INTRAMUSCULAR; INTRAVENOUS
Status: DISCONTINUED | OUTPATIENT
Start: 2023-07-06 | End: 2023-07-06 | Stop reason: HOSPADM

## 2023-07-06 RX ORDER — PHENYLEPHRINE HCL IN 0.9% NACL 1 MG/10 ML
SYRINGE (ML) INTRAVENOUS PRN
Status: DISCONTINUED | OUTPATIENT
Start: 2023-07-06 | End: 2023-07-06 | Stop reason: SDUPTHER

## 2023-07-06 RX ORDER — MIDAZOLAM HYDROCHLORIDE 1 MG/ML
INJECTION INTRAMUSCULAR; INTRAVENOUS PRN
Status: DISCONTINUED | OUTPATIENT
Start: 2023-07-06 | End: 2023-07-06 | Stop reason: SDUPTHER

## 2023-07-06 RX ORDER — MEPERIDINE HYDROCHLORIDE 50 MG/ML
12.5 INJECTION INTRAMUSCULAR; INTRAVENOUS; SUBCUTANEOUS ONCE
Status: DISCONTINUED | OUTPATIENT
Start: 2023-07-06 | End: 2023-07-06 | Stop reason: HOSPADM

## 2023-07-06 RX ORDER — DIPHENHYDRAMINE HYDROCHLORIDE 50 MG/ML
12.5 INJECTION INTRAMUSCULAR; INTRAVENOUS
Status: DISCONTINUED | OUTPATIENT
Start: 2023-07-06 | End: 2023-07-06 | Stop reason: HOSPADM

## 2023-07-06 RX ORDER — OXYCODONE HYDROCHLORIDE 5 MG/1
5 TABLET ORAL
Status: DISCONTINUED | OUTPATIENT
Start: 2023-07-06 | End: 2023-07-06 | Stop reason: HOSPADM

## 2023-07-06 RX ORDER — ONDANSETRON 2 MG/ML
INJECTION INTRAMUSCULAR; INTRAVENOUS PRN
Status: DISCONTINUED | OUTPATIENT
Start: 2023-07-06 | End: 2023-07-06 | Stop reason: SDUPTHER

## 2023-07-06 RX ORDER — IPRATROPIUM BROMIDE AND ALBUTEROL SULFATE 2.5; .5 MG/3ML; MG/3ML
1 SOLUTION RESPIRATORY (INHALATION)
Status: DISCONTINUED | OUTPATIENT
Start: 2023-07-06 | End: 2023-07-06 | Stop reason: HOSPADM

## 2023-07-06 RX ORDER — EPHEDRINE SULFATE/0.9% NACL/PF 50 MG/5 ML
SYRINGE (ML) INTRAVENOUS PRN
Status: DISCONTINUED | OUTPATIENT
Start: 2023-07-06 | End: 2023-07-06 | Stop reason: SDUPTHER

## 2023-07-06 RX ORDER — SODIUM CHLORIDE 9 MG/ML
INJECTION, SOLUTION INTRAVENOUS PRN
Status: DISCONTINUED | OUTPATIENT
Start: 2023-07-06 | End: 2023-07-06 | Stop reason: HOSPADM

## 2023-07-06 RX ORDER — FUROSEMIDE 10 MG/ML
INJECTION INTRAMUSCULAR; INTRAVENOUS PRN
Status: DISCONTINUED | OUTPATIENT
Start: 2023-07-06 | End: 2023-07-06 | Stop reason: SDUPTHER

## 2023-07-06 RX ADMIN — Medication 10 MG: at 11:29

## 2023-07-06 RX ADMIN — FUROSEMIDE 10 MG: 10 INJECTION, SOLUTION INTRAMUSCULAR; INTRAVENOUS at 12:27

## 2023-07-06 RX ADMIN — LIDOCAINE HYDROCHLORIDE 60 MG: 20 INJECTION, SOLUTION EPIDURAL; INFILTRATION; INTRACAUDAL; PERINEURAL at 11:26

## 2023-07-06 RX ADMIN — PROPOFOL 140 MG: 10 INJECTION, EMULSION INTRAVENOUS at 11:27

## 2023-07-06 RX ADMIN — Medication 100 MCG: at 11:47

## 2023-07-06 RX ADMIN — Medication 100 MCG: at 12:17

## 2023-07-06 RX ADMIN — Medication 10 MG: at 11:35

## 2023-07-06 RX ADMIN — Medication 10 MG: at 11:45

## 2023-07-06 RX ADMIN — Medication 10 MG: at 12:27

## 2023-07-06 RX ADMIN — FENTANYL CITRATE 25 MCG: 50 INJECTION, SOLUTION INTRAMUSCULAR; INTRAVENOUS at 12:22

## 2023-07-06 RX ADMIN — MIDAZOLAM 0.5 MG: 1 INJECTION INTRAMUSCULAR; INTRAVENOUS at 11:20

## 2023-07-06 RX ADMIN — Medication 10 MG: at 11:33

## 2023-07-06 RX ADMIN — FENTANYL CITRATE 50 MCG: 50 INJECTION, SOLUTION INTRAMUSCULAR; INTRAVENOUS at 11:26

## 2023-07-06 RX ADMIN — LEVOFLOXACIN 500 MG: 5 INJECTION, SOLUTION INTRAVENOUS at 11:30

## 2023-07-06 RX ADMIN — SODIUM CHLORIDE, SODIUM LACTATE, POTASSIUM CHLORIDE, AND CALCIUM CHLORIDE: 600; 310; 30; 20 INJECTION, SOLUTION INTRAVENOUS at 12:12

## 2023-07-06 RX ADMIN — ONDANSETRON HYDROCHLORIDE 4 MG: 2 INJECTION INTRAMUSCULAR; INTRAVENOUS at 12:10

## 2023-07-06 RX ADMIN — SODIUM CHLORIDE, SODIUM LACTATE, POTASSIUM CHLORIDE, AND CALCIUM CHLORIDE: 600; 310; 30; 20 INJECTION, SOLUTION INTRAVENOUS at 10:55

## 2023-07-06 RX ADMIN — Medication 100 MCG: at 11:54

## 2023-07-06 RX ADMIN — MIDAZOLAM 1 MG: 1 INJECTION INTRAMUSCULAR; INTRAVENOUS at 11:18

## 2023-07-06 RX ADMIN — FENTANYL CITRATE 25 MCG: 50 INJECTION, SOLUTION INTRAMUSCULAR; INTRAVENOUS at 12:07

## 2023-07-06 ASSESSMENT — PAIN SCALES - GENERAL: PAINLEVEL_OUTOF10: 0

## 2023-07-06 ASSESSMENT — PAIN - FUNCTIONAL ASSESSMENT: PAIN_FUNCTIONAL_ASSESSMENT: 0-10

## 2023-07-06 NOTE — INTERVAL H&P NOTE
Update History & Physical    The patient's History and Physical of June 28, 2023 was reviewed with the patient and I examined the patient. There was no change. The surgical site was confirmed by the patient and me. Plan: The risks, benefits, expected outcome, and alternative to the recommended procedure have been discussed with the patient. Patient understands and wants to proceed with the procedure.      Electronically signed by Steven Escobedo MD on 7/6/2023 at 10:53 AM

## 2023-07-06 NOTE — PERIOP NOTE
TRANSFER - IN REPORT:    Verbal report received from [address] rn on London Bauer  being received from pacu for routine post-op      Report consisted of patient's Situation, Background, Assessment and   Recommendations(SBAR). Information from the following report(s) Nurse Handoff Report was reviewed with the receiving nurse. Opportunity for questions and clarification was provided. Assessment completed upon patient's arrival to unit and care assumed.

## 2023-07-06 NOTE — PERIOP NOTE
TRANSFER - IN REPORT:    Verbal report received from OR RN on Gimenez Bill  being received from OR for routine progression of patient care      Report consisted of patient's Situation, Background, Assessment and   Recommendations(SBAR). Information from the following report(s) Adult Overview, Surgery Report, Intake/Output, and MAR was reviewed with the receiving nurse. Opportunity for questions and clarification was provided. Assessment completed upon patient's arrival to unit and care assumed.

## 2023-07-06 NOTE — PERIOP NOTE
Reviewed PTA medication list with patient/caregiver and patient/caregiver denies any additional medications. Patient admits to having a responsible adult care for them at home for at least 24 hours after surgery. Patient encouraged to use gown warming system and informed that using said warming gown to regulate body temperature prior to a procedure has been shown to help reduce the risks of blood clots and infection. Patient's pharmacy of choice verified and documented in PTA medication section. Dual skin assessment & fall risk band verification completed with [facility] RN.

## 2023-07-06 NOTE — OP NOTE
the verumontanum were intact at the end of the procedure. The bladder was filled. The laser was placed on standby and the cystoscope was removed. A 22-Greek, 5 mL two-way Mendieta catheter was inserted per urethra into the bladder draining clear urine. Ten mL of sterile water was placed in the balloon and the catheter was connected to a leg bag. The patient was then washed off, dried, and placed in the supine position. He was awakened from anesthesia and then transferred to the post anaesthesia care unit.            Catalina Villafuerte MD  7/6/2023  12:37 PM     Electronically signed by Catalina Villafuerte MD on 7/6/2023 at 12:37 PM

## 2023-07-06 NOTE — PERIOP NOTE
TRANSFER - OUT REPORT:    Verbal report given to [address] on Kenneth Relic  being transferred to Phase 2 for routine progression of patient care       Report consisted of patient's Situation, Background, Assessment and   Recommendations(SBAR). Information from the following report(s) Adult Overview, Surgery Report, Intake/Output, and MAR was reviewed with the receiving nurse. Lines:   Peripheral IV 07/06/23 Left;Ventral Forearm (Active)   Site Assessment Clean, dry & intact 07/06/23 1300   Line Status Infusing 07/06/23 1300   Phlebitis Assessment No symptoms 07/06/23 1300   Infiltration Assessment 0 07/06/23 1300   Dressing Status Clean, dry & intact 07/06/23 1300   Dressing Type Transparent 07/06/23 1300        Opportunity for questions and clarification was provided.       Patient transported with:  Registered Nurse

## 2024-04-05 ENCOUNTER — TELEPHONE (OUTPATIENT)
Dept: FAMILY MEDICINE CLINIC | Age: 79
End: 2024-04-05

## 2024-04-05 ENCOUNTER — APPOINTMENT (OUTPATIENT)
Facility: HOSPITAL | Age: 79
End: 2024-04-05
Payer: MEDICARE

## 2024-04-05 ENCOUNTER — HOSPITAL ENCOUNTER (EMERGENCY)
Facility: HOSPITAL | Age: 79
Discharge: HOME | End: 2024-04-05
Attending: EMERGENCY MEDICINE
Payer: MEDICARE

## 2024-04-05 VITALS
SYSTOLIC BLOOD PRESSURE: 120 MMHG | OXYGEN SATURATION: 100 % | RESPIRATION RATE: 14 BRPM | HEIGHT: 72 IN | TEMPERATURE: 98.2 F | BODY MASS INDEX: 30.81 KG/M2 | DIASTOLIC BLOOD PRESSURE: 67 MMHG | WEIGHT: 227.5 LBS | HEART RATE: 90 BPM

## 2024-04-05 DIAGNOSIS — N30.00 ACUTE CYSTITIS WITHOUT HEMATURIA: Primary | ICD-10-CM

## 2024-04-05 LAB
ALBUMIN SERPL-MCNC: 3.7 G/DL (ref 3.5–5)
ALBUMIN/GLOB SERPL: 1.1 (ref 1.1–2.2)
ALP SERPL-CCNC: 113 U/L (ref 45–117)
ALT SERPL-CCNC: 14 U/L (ref 12–78)
ANION GAP SERPL CALC-SCNC: 8 MMOL/L (ref 5–15)
APPEARANCE UR: ABNORMAL
AST SERPL-CCNC: 13 U/L (ref 15–37)
BACTERIA URNS QL MICRO: ABNORMAL /HPF
BASOPHILS # BLD: 0.1 K/UL (ref 0–0.1)
BASOPHILS NFR BLD: 1 % (ref 0–1)
BILIRUB SERPL-MCNC: 0.6 MG/DL (ref 0.2–1)
BILIRUB UR QL: NEGATIVE
BUN SERPL-MCNC: 12 MG/DL (ref 6–20)
BUN/CREAT SERPL: 10 (ref 12–20)
CALCIUM SERPL-MCNC: 8.5 MG/DL (ref 8.5–10.1)
CHLORIDE SERPL-SCNC: 101 MMOL/L (ref 97–108)
CO2 SERPL-SCNC: 28 MMOL/L (ref 21–32)
COLOR UR: ABNORMAL
CREAT SERPL-MCNC: 1.19 MG/DL (ref 0.7–1.3)
DIFFERENTIAL METHOD BLD: ABNORMAL
EOSINOPHIL # BLD: 0 K/UL (ref 0–0.4)
EOSINOPHIL NFR BLD: 0 % (ref 0–7)
EPITH CASTS URNS QL MICRO: ABNORMAL /LPF
ERYTHROCYTE [DISTWIDTH] IN BLOOD BY AUTOMATED COUNT: 13.3 % (ref 11.5–14.5)
FLUAV RNA SPEC QL NAA+PROBE: NOT DETECTED
FLUBV RNA SPEC QL NAA+PROBE: NOT DETECTED
GLOBULIN SER CALC-MCNC: 3.4 G/DL (ref 2–4)
GLUCOSE SERPL-MCNC: 103 MG/DL (ref 65–100)
GLUCOSE UR STRIP.AUTO-MCNC: NEGATIVE MG/DL
HCT VFR BLD AUTO: 42.9 % (ref 36.6–50.3)
HGB BLD-MCNC: 14.5 G/DL (ref 12.1–17)
HGB UR QL STRIP: ABNORMAL
IMM GRANULOCYTES # BLD AUTO: 0 K/UL (ref 0–0.04)
IMM GRANULOCYTES NFR BLD AUTO: 0 % (ref 0–0.5)
KETONES UR QL STRIP.AUTO: NEGATIVE MG/DL
LACTATE SERPL-SCNC: 1.1 MMOL/L (ref 0.4–2)
LEUKOCYTE ESTERASE UR QL STRIP.AUTO: ABNORMAL
LYMPHOCYTES # BLD: 0.6 K/UL (ref 0.8–3.5)
LYMPHOCYTES NFR BLD: 10 % (ref 12–49)
MAGNESIUM SERPL-MCNC: 1.7 MG/DL (ref 1.6–2.4)
MCH RBC QN AUTO: 32.4 PG (ref 26–34)
MCHC RBC AUTO-ENTMCNC: 33.8 G/DL (ref 30–36.5)
MCV RBC AUTO: 96 FL (ref 80–99)
MONOCYTES # BLD: 0.3 K/UL (ref 0–1)
MONOCYTES NFR BLD: 5 % (ref 5–13)
NEUTS SEG # BLD: 5.2 K/UL (ref 1.8–8)
NEUTS SEG NFR BLD: 84 % (ref 32–75)
NITRITE UR QL STRIP.AUTO: POSITIVE
NRBC # BLD: 0 K/UL (ref 0–0.01)
NRBC BLD-RTO: 0 PER 100 WBC
PH UR STRIP: 6 (ref 5–8)
PLATELET # BLD AUTO: 227 K/UL (ref 150–400)
PMV BLD AUTO: 12.1 FL (ref 8.9–12.9)
POTASSIUM SERPL-SCNC: 4.5 MMOL/L (ref 3.5–5.1)
PROT SERPL-MCNC: 7.1 G/DL (ref 6.4–8.2)
PROT UR STRIP-MCNC: ABNORMAL MG/DL
RBC # BLD AUTO: 4.47 M/UL (ref 4.1–5.7)
RBC #/AREA URNS HPF: ABNORMAL /HPF (ref 0–5)
RBC MORPH BLD: ABNORMAL
SARS-COV-2 RNA RESP QL NAA+PROBE: NOT DETECTED
SODIUM SERPL-SCNC: 137 MMOL/L (ref 136–145)
SP GR UR REFRACTOMETRY: 1.01 (ref 1–1.03)
TROPONIN I SERPL HS-MCNC: 7 NG/L (ref 0–76)
URINE CULTURE IF INDICATED: ABNORMAL
UROBILINOGEN UR QL STRIP.AUTO: 1 EU/DL (ref 0.2–1)
WBC # BLD AUTO: 6.2 K/UL (ref 4.1–11.1)
WBC URNS QL MICRO: >100 /HPF (ref 0–4)

## 2024-04-05 PROCEDURE — 84484 ASSAY OF TROPONIN QUANT: CPT

## 2024-04-05 PROCEDURE — 80053 COMPREHEN METABOLIC PANEL: CPT

## 2024-04-05 PROCEDURE — 83735 ASSAY OF MAGNESIUM: CPT

## 2024-04-05 PROCEDURE — 2580000003 HC RX 258: Performed by: EMERGENCY MEDICINE

## 2024-04-05 PROCEDURE — 85025 COMPLETE CBC W/AUTO DIFF WBC: CPT

## 2024-04-05 PROCEDURE — 81001 URINALYSIS AUTO W/SCOPE: CPT

## 2024-04-05 PROCEDURE — 6360000002 HC RX W HCPCS: Performed by: EMERGENCY MEDICINE

## 2024-04-05 PROCEDURE — 83605 ASSAY OF LACTIC ACID: CPT

## 2024-04-05 PROCEDURE — 36415 COLL VENOUS BLD VENIPUNCTURE: CPT

## 2024-04-05 PROCEDURE — 87040 BLOOD CULTURE FOR BACTERIA: CPT

## 2024-04-05 PROCEDURE — 87086 URINE CULTURE/COLONY COUNT: CPT

## 2024-04-05 PROCEDURE — 87636 SARSCOV2 & INF A&B AMP PRB: CPT

## 2024-04-05 PROCEDURE — 96361 HYDRATE IV INFUSION ADD-ON: CPT

## 2024-04-05 PROCEDURE — 6370000000 HC RX 637 (ALT 250 FOR IP): Performed by: EMERGENCY MEDICINE

## 2024-04-05 PROCEDURE — 96374 THER/PROPH/DIAG INJ IV PUSH: CPT

## 2024-04-05 PROCEDURE — 84145 PROCALCITONIN (PCT): CPT

## 2024-04-05 PROCEDURE — 99285 EMERGENCY DEPT VISIT HI MDM: CPT

## 2024-04-05 PROCEDURE — 71045 X-RAY EXAM CHEST 1 VIEW: CPT

## 2024-04-05 RX ORDER — LEVOFLOXACIN 500 MG/1
500 TABLET, FILM COATED ORAL
Status: COMPLETED | OUTPATIENT
Start: 2024-04-05 | End: 2024-04-05

## 2024-04-05 RX ORDER — 0.9 % SODIUM CHLORIDE 0.9 %
1000 INTRAVENOUS SOLUTION INTRAVENOUS ONCE
Status: COMPLETED | OUTPATIENT
Start: 2024-04-05 | End: 2024-04-05

## 2024-04-05 RX ORDER — LEVOFLOXACIN 500 MG/1
500 TABLET, FILM COATED ORAL DAILY
Qty: 10 TABLET | Refills: 0 | Status: SHIPPED | OUTPATIENT
Start: 2024-04-05 | End: 2024-04-15

## 2024-04-05 RX ADMIN — LEVOFLOXACIN 500 MG: 500 TABLET, FILM COATED ORAL at 23:09

## 2024-04-05 RX ADMIN — WATER 1000 MG: 1 INJECTION INTRAMUSCULAR; INTRAVENOUS; SUBCUTANEOUS at 21:06

## 2024-04-05 RX ADMIN — SODIUM CHLORIDE 1000 ML: 9 INJECTION, SOLUTION INTRAVENOUS at 21:05

## 2024-04-05 ASSESSMENT — PAIN - FUNCTIONAL ASSESSMENT: PAIN_FUNCTIONAL_ASSESSMENT: NONE - DENIES PAIN

## 2024-04-05 ASSESSMENT — PAIN DESCRIPTION - PAIN TYPE: TYPE: ACUTE PAIN

## 2024-04-05 ASSESSMENT — ENCOUNTER SYMPTOMS
ABDOMINAL PAIN: 0
VOMITING: 0
SORE THROAT: 0
NAUSEA: 0
BACK PAIN: 0
SHORTNESS OF BREATH: 0

## 2024-04-05 ASSESSMENT — LIFESTYLE VARIABLES
HOW OFTEN DO YOU HAVE A DRINK CONTAINING ALCOHOL: 2-4 TIMES A MONTH
HOW MANY STANDARD DRINKS CONTAINING ALCOHOL DO YOU HAVE ON A TYPICAL DAY: 1 OR 2

## 2024-04-05 ASSESSMENT — PAIN SCALES - GENERAL: PAINLEVEL_OUTOF10: 0

## 2024-04-05 NOTE — TELEPHONE ENCOUNTER
Spoke to patient after verifying two identifiers, states approximately 45 minutes ago he started to experience irregular hear rates of 61,71,64,116,133,155 accompanied by low oxygen readings of 88/84/74/87.  He is having some shaking as well.  Current reading 119 HR, 87 oxygen.  He has experienced these symptoms before and was admitted to the Hospital in the past. Denies:  SOB, Chest Pain.  Advised to call [facility] to be further evaluated.  Acknowledged understanding.

## 2024-04-06 LAB — PROCALCITONIN SERPL-MCNC: 0.1 NG/ML

## 2024-04-06 NOTE — ED TRIAGE NOTES
Concerns about low oxygen rate, elevated heart rate and chills.  On arrival patient states he feels fine. May have old equipment at home.

## 2024-04-06 NOTE — ED PROVIDER NOTES
Calcium 8.5 8.5 - 10.1 MG/DL    Total Bilirubin 0.6 0.2 - 1.0 MG/DL    ALT 14 12 - 78 U/L    AST 13 (L) 15 - 37 U/L    Alk Phosphatase 113 45 - 117 U/L    Total Protein 7.1 6.4 - 8.2 g/dL    Albumin 3.7 3.5 - 5.0 g/dL    Globulin 3.4 2.0 - 4.0 g/dL    Albumin/Globulin Ratio 1.1 1.1 - 2.2     Magnesium    Collection Time: 04/05/24  8:58 PM   Result Value Ref Range    Magnesium 1.7 1.6 - 2.4 mg/dL   Troponin    Collection Time: 04/05/24  8:58 PM   Result Value Ref Range    Troponin, High Sensitivity 7 0 - 76 ng/L   COVID-19 & Influenza Combo    Collection Time: 04/05/24  9:30 PM    Specimen: Nasopharyngeal   Result Value Ref Range    SARS-CoV-2, PCR Not detected NOTD      Rapid Influenza A By PCR Not detected NOTD      Rapid Influenza B By PCR Not detected NOTD     Urinalysis with Reflex to Culture    Collection Time: 04/05/24 10:13 PM    Specimen: Urine   Result Value Ref Range    Color, UA YELLOW/STRAW      Appearance CLOUDY (A) CLEAR      Specific Gravity, UA 1.015 1.003 - 1.030      pH, Urine 6.0 5.0 - 8.0      Protein, UA TRACE (A) NEG mg/dL    Glucose, UA Negative NEG mg/dL    Ketones, Urine Negative NEG mg/dL    Bilirubin Urine Negative NEG      Blood, Urine TRACE (A) NEG      Urobilinogen, Urine 1.0 0.2 - 1.0 EU/dL    Nitrite, Urine Positive (A) NEG      Leukocyte Esterase, Urine MODERATE (A) NEG      WBC, UA >100 (H) 0 - 4 /hpf    RBC, UA 0-5 0 - 5 /hpf    Epithelial Cells UA FEW FEW /lpf    BACTERIA, URINE 3+ (A) NEG /hpf    Urine Culture if Indicated URINE CULTURE ORDERED (A) CNI         EKG:   EKG interpretation: (Preliminary)  Rhythm: normal sinus rhythm;  regular . Rate (approx.): 96; Blocks: none; Ectopy: noneAxis: left axis deviation; P wave: normal; QRS interval: normal ; ST/T wave: normal; in  Lead: ; Other findings: .  As Interpreted by me  Emile Malagon MD     RADIOLOGY:  Non-plain film images such as CT, Ultrasound and MRI are read by the radiologist. Plain radiographic images are visualized and

## 2024-04-07 LAB
BACTERIA SPEC CULT: ABNORMAL
BACTERIA SPEC CULT: NORMAL
BACTERIA SPEC CULT: NORMAL
CC UR VC: ABNORMAL
SERVICE CMNT-IMP: ABNORMAL
SERVICE CMNT-IMP: NORMAL
SERVICE CMNT-IMP: NORMAL

## 2024-04-08 LAB
BACTERIA SPEC CULT: ABNORMAL
CC UR VC: ABNORMAL
SERVICE CMNT-IMP: ABNORMAL

## 2024-04-12 LAB
BACTERIA SPEC CULT: NORMAL
BACTERIA SPEC CULT: NORMAL
SERVICE CMNT-IMP: NORMAL
SERVICE CMNT-IMP: NORMAL

## 2024-04-19 SDOH — ECONOMIC STABILITY: FOOD INSECURITY: WITHIN THE PAST 12 MONTHS, YOU WORRIED THAT YOUR FOOD WOULD RUN OUT BEFORE YOU GOT MONEY TO BUY MORE.: PATIENT DECLINED

## 2024-04-19 SDOH — ECONOMIC STABILITY: FOOD INSECURITY: WITHIN THE PAST 12 MONTHS, THE FOOD YOU BOUGHT JUST DIDN'T LAST AND YOU DIDN'T HAVE MONEY TO GET MORE.: PATIENT DECLINED

## 2024-04-19 SDOH — ECONOMIC STABILITY: INCOME INSECURITY: HOW HARD IS IT FOR YOU TO PAY FOR THE VERY BASICS LIKE FOOD, HOUSING, MEDICAL CARE, AND HEATING?: PATIENT DECLINED

## 2024-04-19 SDOH — ECONOMIC STABILITY: TRANSPORTATION INSECURITY
IN THE PAST 12 MONTHS, HAS LACK OF TRANSPORTATION KEPT YOU FROM MEETINGS, WORK, OR FROM GETTING THINGS NEEDED FOR DAILY LIVING?: PATIENT DECLINED

## 2024-04-19 SDOH — ECONOMIC STABILITY: HOUSING INSECURITY
IN THE LAST 12 MONTHS, WAS THERE A TIME WHEN YOU DID NOT HAVE A STEADY PLACE TO SLEEP OR SLEPT IN A SHELTER (INCLUDING NOW)?: PATIENT DECLINED

## 2024-04-22 ENCOUNTER — OFFICE VISIT (OUTPATIENT)
Dept: FAMILY MEDICINE CLINIC | Age: 79
End: 2024-04-22
Payer: MEDICARE

## 2024-04-22 VITALS
HEART RATE: 46 BPM | DIASTOLIC BLOOD PRESSURE: 50 MMHG | OXYGEN SATURATION: 96 % | WEIGHT: 221 LBS | SYSTOLIC BLOOD PRESSURE: 118 MMHG | BODY MASS INDEX: 29.93 KG/M2 | TEMPERATURE: 96.9 F | RESPIRATION RATE: 16 BRPM | HEIGHT: 72 IN

## 2024-04-22 DIAGNOSIS — N30.01 ACUTE CYSTITIS WITH HEMATURIA: ICD-10-CM

## 2024-04-22 DIAGNOSIS — R31.9 HEMATURIA, UNSPECIFIED TYPE: Primary | ICD-10-CM

## 2024-04-22 DIAGNOSIS — Z00.00 MEDICARE ANNUAL WELLNESS VISIT, SUBSEQUENT: ICD-10-CM

## 2024-04-22 LAB
BILIRUBIN, URINE, POC: NEGATIVE
BLOOD URINE, POC: NEGATIVE
GLUCOSE URINE, POC: NEGATIVE
KETONES, URINE, POC: NEGATIVE
LEUKOCYTE ESTERASE, URINE, POC: NEGATIVE
NITRITE, URINE, POC: NEGATIVE
PH, URINE, POC: 7 (ref 4.6–8)
PROTEIN,URINE, POC: NORMAL
SPECIFIC GRAVITY, URINE, POC: 1.02 (ref 1–1.03)
URINALYSIS CLARITY, POC: CLEAR
URINALYSIS COLOR, POC: YELLOW
UROBILINOGEN, POC: NORMAL

## 2024-04-22 PROCEDURE — G8428 CUR MEDS NOT DOCUMENT: HCPCS | Performed by: FAMILY MEDICINE

## 2024-04-22 PROCEDURE — 99213 OFFICE O/P EST LOW 20 MIN: CPT | Performed by: FAMILY MEDICINE

## 2024-04-22 PROCEDURE — 81003 URINALYSIS AUTO W/O SCOPE: CPT | Performed by: FAMILY MEDICINE

## 2024-04-22 PROCEDURE — 1036F TOBACCO NON-USER: CPT | Performed by: FAMILY MEDICINE

## 2024-04-22 PROCEDURE — G0439 PPPS, SUBSEQ VISIT: HCPCS | Performed by: FAMILY MEDICINE

## 2024-04-22 PROCEDURE — G8419 CALC BMI OUT NRM PARAM NOF/U: HCPCS | Performed by: FAMILY MEDICINE

## 2024-04-22 PROCEDURE — 1123F ACP DISCUSS/DSCN MKR DOCD: CPT | Performed by: FAMILY MEDICINE

## 2024-04-22 ASSESSMENT — LIFESTYLE VARIABLES
HOW OFTEN DO YOU HAVE A DRINK CONTAINING ALCOHOL: MONTHLY OR LESS
HOW MANY STANDARD DRINKS CONTAINING ALCOHOL DO YOU HAVE ON A TYPICAL DAY: 1 OR 2

## 2024-04-22 ASSESSMENT — PATIENT HEALTH QUESTIONNAIRE - PHQ9
SUM OF ALL RESPONSES TO PHQ QUESTIONS 1-9: 0
SUM OF ALL RESPONSES TO PHQ QUESTIONS 1-9: 0
2. FEELING DOWN, DEPRESSED OR HOPELESS: NOT AT ALL
1. LITTLE INTEREST OR PLEASURE IN DOING THINGS: NOT AT ALL
SUM OF ALL RESPONSES TO PHQ9 QUESTIONS 1 & 2: 0
SUM OF ALL RESPONSES TO PHQ QUESTIONS 1-9: 0
SUM OF ALL RESPONSES TO PHQ QUESTIONS 1-9: 0

## 2024-04-22 NOTE — PROGRESS NOTES
Medicare Annual Wellness Visit    Jad Brewer is here for Hematuria ([facility] ER -  4/5/24 ) and Medicare AWV    Assessment & Plan   Hematuria, unspecified type  -     AMB POC URINALYSIS DIP STICK AUTO W/O MICRO  Medicare annual wellness visit, subsequent    Recommendations for Preventive Services Due: see orders and patient instructions/AVS.  Recommended screening schedule for the next 5-10 years is provided to the patient in written form: see Patient Instructions/AVS.     No follow-ups on file.     Subjective       Patient's complete Health Risk Assessment and screening values have been reviewed and are found in Flowsheets. The following problems were reviewed today and where indicated follow up appointments were made and/or referrals ordered.    Positive Risk Factor Screenings with Interventions:                Activity, Diet, and Weight:  On average, how many days per week do you engage in moderate to strenuous exercise (like a brisk walk)?: 0 days  On average, how many minutes do you engage in exercise at this level?: 0 min    Do you eat balanced/healthy meals regularly?: Yes    Body mass index is 29.97 kg/m².      Inactivity Interventions:  Patient declined any further interventions or treatment           Safety:  Do you have non-slip mats or non-slip surfaces or shower bars or grab bars in your shower or bathtub?: (!) No  Interventions:  Patient declined any further interventions or treatment     Advanced Directives:  Do you have a Living Will?: (!) No    Intervention:                       Objective   Vitals:    04/22/24 0946   BP: (!) 118/50   Site: Left Upper Arm   Position: Sitting   Cuff Size: Medium Adult   Pulse: (!) 46   Resp: 16   Temp: 96.9 °F (36.1 °C)   TempSrc: Oral   SpO2: 96%   Weight: 100.2 kg (221 lb)   Height: 1.829 m (6')      Body mass index is 29.97 kg/m².               No Known Allergies  Prior to Visit Medications    Medication Sig Taking? Authorizing Provider   tamsulosin (FLOMAX) 0.4 MG

## 2024-04-22 NOTE — PATIENT INSTRUCTIONS
adult-strength or 2 to 4 low-dose aspirin. Wait for an ambulance. Do not try to drive yourself.  Watch closely for changes in your health, and be sure to contact your doctor if you have any problems.  Where can you learn more?  Go to https://www.[employer].net/patientEd and enter F075 to learn more about \"A Healthy Heart: Care Instructions.\"  Current as of: June 24, 2023               Content Version: 14.0  © 7701-5417 [employer].   Care instructions adapted under license by [employer]. If you have questions about a medical condition or this instruction, always ask your healthcare professional. [employer] disclaims any warranty or liability for your use of this information.      Personalized Preventive Plan for Jad Brewer - 4/22/2024  Medicare offers a range of preventive health benefits. Some of the tests and screenings are paid in full while other may be subject to a deductible, co-insurance, and/or copay.    Some of these benefits include a comprehensive review of your medical history including lifestyle, illnesses that may run in your family, and various assessments and screenings as appropriate.    After reviewing your medical record and screening and assessments performed today your provider may have ordered immunizations, labs, imaging, and/or referrals for you.  A list of these orders (if applicable) as well as your Preventive Care list are included within your After Visit Summary for your review.    Other Preventive Recommendations:    A preventive eye exam performed by an eye specialist is recommended every 1-2 years to screen for glaucoma; cataracts, macular degeneration, and other eye disorders.  A preventive dental visit is recommended every 6 months.  Try to get at least 150 minutes of exercise per week or 10,000 steps per day on a pedometer .  Order or download the FREE \"Exercise & Physical Activity: Your Everyday Guide\" from [employer]. Call

## 2024-04-22 NOTE — PROGRESS NOTES
\"Have you been to the ER, urgent care clinic since your last visit?  Hospitalized since your last visit?\"     Yes - [facility] ER 4/5/24 -  Cystitis     “Have you seen or consulted any other health care providers outside of [facility] since your last visit?”     Yes - [facility] - 1/10/24      4/22/2024      Chief Complaint   Patient presents with    Hematuria     [facility] ER -  4/5/24     Medicare AWV         History of Present Illness:         is a 78 y.o. male seen in ED for pseudomomas cystitis. Has completed abx. No sxs.      No Known Allergies    Current Outpatient Medications   Medication Sig Dispense Refill    tamsulosin (FLOMAX) 0.4 MG capsule Take 1 capsule by mouth daily c       No current facility-administered medications for this visit.             Physical Examination:    BP (!) 118/50 (Site: Left Upper Arm, Position: Sitting, Cuff Size: Medium Adult)   Pulse (!) 46   Temp 96.9 °F (36.1 °C) (Oral)   Resp 16   Ht 1.829 m (6')   Wt 100.2 kg (221 lb)   SpO2 96%   BMI 29.97 kg/m²    General:  Alert, cooperative, no distress.   HEENT:  Normocephalic, without obvious abnormality, atraumatic.Conjunctivae/corneas clear. Pupils equal, round, reactive to light. Extraocular movements intact.TMs and external canals normal bilaterally. Nasal mucosa and oropharynx clear.   Lungs: Clear to auscultation bilaterally.   Chest wall:  No tenderness or deformity.   Heart:  Regular rate and rhythm, S1, S2 normal, no murmur, click, rub, or gallop.   Abdomen:   Soft, non-tender. Bowel sounds normal. No masses. No organomegaly.   Extremities: Extremities normal, atraumatic, no cyanosis or edema.   Pulses: 2+ and symmetric all extremities.   Skin: Skin color, texture, turgor normal. No rashes or lesions.   Lymph nodes: Cervical, supraclavicular, and axillary nodes normal.   Neurologic: CNII-XII intact. Normal strength, sensation, and reflexes throughout.     Results for orders placed or performed in visit on

## 2024-09-12 ENCOUNTER — HOSPITAL ENCOUNTER (EMERGENCY)
Facility: HOSPITAL | Age: 79
Discharge: HOME | End: 2024-09-12
Attending: EMERGENCY MEDICINE
Payer: MEDICARE

## 2024-09-12 ENCOUNTER — APPOINTMENT (OUTPATIENT)
Facility: HOSPITAL | Age: 79
End: 2024-09-12
Payer: MEDICARE

## 2024-09-12 VITALS
DIASTOLIC BLOOD PRESSURE: 79 MMHG | RESPIRATION RATE: 15 BRPM | OXYGEN SATURATION: 100 % | TEMPERATURE: 99.3 F | BODY MASS INDEX: 30.48 KG/M2 | HEART RATE: 51 BPM | WEIGHT: 225 LBS | HEIGHT: 72 IN | SYSTOLIC BLOOD PRESSURE: 105 MMHG

## 2024-09-12 DIAGNOSIS — R50.9 FEVER, UNSPECIFIED FEVER CAUSE: Primary | ICD-10-CM

## 2024-09-12 DIAGNOSIS — R53.83 OTHER FATIGUE: ICD-10-CM

## 2024-09-12 DIAGNOSIS — R00.1 BRADYCARDIA: ICD-10-CM

## 2024-09-12 LAB
ALBUMIN SERPL-MCNC: 3.6 G/DL (ref 3.5–5)
ALBUMIN/GLOB SERPL: 1.2 (ref 1.1–2.2)
ALP SERPL-CCNC: 97 U/L (ref 45–117)
ALT SERPL-CCNC: 18 U/L (ref 12–78)
ANION GAP SERPL CALC-SCNC: 10 MMOL/L (ref 2–12)
APPEARANCE UR: CLEAR
AST SERPL-CCNC: 24 U/L (ref 15–37)
BACTERIA URNS QL MICRO: NEGATIVE /HPF
BASOPHILS # BLD: 0 K/UL (ref 0–0.1)
BASOPHILS NFR BLD: 1 % (ref 0–1)
BILIRUB SERPL-MCNC: 0.5 MG/DL (ref 0.2–1)
BILIRUB UR QL: NEGATIVE
BUN SERPL-MCNC: 12 MG/DL (ref 6–20)
BUN/CREAT SERPL: 11 (ref 12–20)
CALCIUM SERPL-MCNC: 8.4 MG/DL (ref 8.5–10.1)
CHLORIDE SERPL-SCNC: 103 MMOL/L (ref 97–108)
CO2 SERPL-SCNC: 25 MMOL/L (ref 21–32)
COLOR UR: ABNORMAL
CREAT SERPL-MCNC: 1.12 MG/DL (ref 0.7–1.3)
DIFFERENTIAL METHOD BLD: ABNORMAL
EOSINOPHIL # BLD: 0 K/UL (ref 0–0.4)
EOSINOPHIL NFR BLD: 0 % (ref 0–7)
EPITH CASTS URNS QL MICRO: ABNORMAL /LPF
ERYTHROCYTE [DISTWIDTH] IN BLOOD BY AUTOMATED COUNT: 13.9 % (ref 11.5–14.5)
FLUAV RNA SPEC QL NAA+PROBE: NOT DETECTED
FLUBV RNA SPEC QL NAA+PROBE: NOT DETECTED
GLOBULIN SER CALC-MCNC: 3.1 G/DL (ref 2–4)
GLUCOSE SERPL-MCNC: 98 MG/DL (ref 65–100)
GLUCOSE UR STRIP.AUTO-MCNC: NEGATIVE MG/DL
HCT VFR BLD AUTO: 37.9 % (ref 36.6–50.3)
HGB BLD-MCNC: 13.2 G/DL (ref 12.1–17)
HGB UR QL STRIP: ABNORMAL
IMM GRANULOCYTES # BLD AUTO: 0 K/UL (ref 0–0.04)
IMM GRANULOCYTES NFR BLD AUTO: 0 % (ref 0–0.5)
KETONES UR QL STRIP.AUTO: ABNORMAL MG/DL
LACTATE SERPL-SCNC: 0.7 MMOL/L (ref 0.4–2)
LEUKOCYTE ESTERASE UR QL STRIP.AUTO: ABNORMAL
LYMPHOCYTES # BLD: 0.8 K/UL (ref 0.8–3.5)
LYMPHOCYTES NFR BLD: 24 % (ref 12–49)
MCH RBC QN AUTO: 33.2 PG (ref 26–34)
MCHC RBC AUTO-ENTMCNC: 34.8 G/DL (ref 30–36.5)
MCV RBC AUTO: 95.5 FL (ref 80–99)
MONOCYTES # BLD: 0.2 K/UL (ref 0–1)
MONOCYTES NFR BLD: 5 % (ref 5–13)
NEUTS SEG # BLD: 2.4 K/UL (ref 1.8–8)
NEUTS SEG NFR BLD: 70 % (ref 32–75)
NITRITE UR QL STRIP.AUTO: NEGATIVE
NRBC # BLD: 0 K/UL (ref 0–0.01)
NRBC BLD-RTO: 0 PER 100 WBC
PH UR STRIP: 6.5 (ref 5–8)
PLATELET # BLD AUTO: 182 K/UL (ref 150–400)
PMV BLD AUTO: 10.1 FL (ref 8.9–12.9)
POTASSIUM SERPL-SCNC: 4.2 MMOL/L (ref 3.5–5.1)
PROT SERPL-MCNC: 6.7 G/DL (ref 6.4–8.2)
PROT UR STRIP-MCNC: 30 MG/DL
RBC # BLD AUTO: 3.97 M/UL (ref 4.1–5.7)
RBC #/AREA URNS HPF: ABNORMAL /HPF (ref 0–5)
SARS-COV-2 RNA RESP QL NAA+PROBE: NOT DETECTED
SODIUM SERPL-SCNC: 138 MMOL/L (ref 136–145)
SOURCE: NORMAL
SP GR UR REFRACTOMETRY: 1.02 (ref 1–1.03)
TROPONIN I SERPL HS-MCNC: 5 NG/L (ref 0–76)
URINE CULTURE IF INDICATED: ABNORMAL
UROBILINOGEN UR QL STRIP.AUTO: 1 EU/DL (ref 0.2–1)
WBC # BLD AUTO: 3.5 K/UL (ref 4.1–11.1)
WBC URNS QL MICRO: ABNORMAL /HPF (ref 0–4)

## 2024-09-12 PROCEDURE — 80053 COMPREHEN METABOLIC PANEL: CPT

## 2024-09-12 PROCEDURE — 6370000000 HC RX 637 (ALT 250 FOR IP): Performed by: EMERGENCY MEDICINE

## 2024-09-12 PROCEDURE — 85025 COMPLETE CBC W/AUTO DIFF WBC: CPT

## 2024-09-12 PROCEDURE — 71045 X-RAY EXAM CHEST 1 VIEW: CPT

## 2024-09-12 PROCEDURE — 99285 EMERGENCY DEPT VISIT HI MDM: CPT

## 2024-09-12 PROCEDURE — 84484 ASSAY OF TROPONIN QUANT: CPT

## 2024-09-12 PROCEDURE — 83605 ASSAY OF LACTIC ACID: CPT

## 2024-09-12 PROCEDURE — 87636 SARSCOV2 & INF A&B AMP PRB: CPT

## 2024-09-12 PROCEDURE — 36415 COLL VENOUS BLD VENIPUNCTURE: CPT

## 2024-09-12 PROCEDURE — 87040 BLOOD CULTURE FOR BACTERIA: CPT

## 2024-09-12 PROCEDURE — 2580000003 HC RX 258: Performed by: EMERGENCY MEDICINE

## 2024-09-12 PROCEDURE — 84145 PROCALCITONIN (PCT): CPT

## 2024-09-12 PROCEDURE — 81001 URINALYSIS AUTO W/SCOPE: CPT

## 2024-09-12 PROCEDURE — 96360 HYDRATION IV INFUSION INIT: CPT

## 2024-09-12 RX ORDER — CIPROFLOXACIN 500 MG/1
500 TABLET, FILM COATED ORAL 2 TIMES DAILY
Qty: 10 TABLET | Refills: 0 | Status: SHIPPED | OUTPATIENT
Start: 2024-09-12 | End: 2024-09-17

## 2024-09-12 RX ORDER — 0.9 % SODIUM CHLORIDE 0.9 %
1000 INTRAVENOUS SOLUTION INTRAVENOUS ONCE
Status: COMPLETED | OUTPATIENT
Start: 2024-09-12 | End: 2024-09-12

## 2024-09-12 RX ORDER — CIPROFLOXACIN 500 MG/1
500 TABLET, FILM COATED ORAL
Status: COMPLETED | OUTPATIENT
Start: 2024-09-12 | End: 2024-09-12

## 2024-09-12 RX ADMIN — SODIUM CHLORIDE 1000 ML: 9 INJECTION, SOLUTION INTRAVENOUS at 19:02

## 2024-09-12 RX ADMIN — CIPROFLOXACIN 500 MG: 500 TABLET, FILM COATED ORAL at 19:57

## 2024-09-12 ASSESSMENT — PAIN - FUNCTIONAL ASSESSMENT: PAIN_FUNCTIONAL_ASSESSMENT: 0-10

## 2024-09-12 ASSESSMENT — LIFESTYLE VARIABLES
HOW OFTEN DO YOU HAVE A DRINK CONTAINING ALCOHOL: NEVER
HOW MANY STANDARD DRINKS CONTAINING ALCOHOL DO YOU HAVE ON A TYPICAL DAY: PATIENT DOES NOT DRINK

## 2024-09-12 ASSESSMENT — PAIN SCALES - GENERAL: PAINLEVEL_OUTOF10: 2

## 2024-09-13 LAB
EKG ATRIAL RATE: 58 BPM
EKG DIAGNOSIS: NORMAL
EKG P AXIS: 37 DEGREES
EKG P-R INTERVAL: 170 MS
EKG Q-T INTERVAL: 416 MS
EKG QRS DURATION: 94 MS
EKG QTC CALCULATION (BAZETT): 408 MS
EKG R AXIS: 15 DEGREES
EKG T AXIS: 52 DEGREES
EKG VENTRICULAR RATE: 58 BPM
PROCALCITONIN SERPL-MCNC: 0.29 NG/ML

## 2024-09-17 LAB
EKG ATRIAL RATE: 58 BPM
EKG DIAGNOSIS: NORMAL
EKG P AXIS: 37 DEGREES
EKG P-R INTERVAL: 170 MS
EKG Q-T INTERVAL: 416 MS
EKG QRS DURATION: 94 MS
EKG QTC CALCULATION (BAZETT): 408 MS
EKG R AXIS: 15 DEGREES
EKG T AXIS: 52 DEGREES
EKG VENTRICULAR RATE: 58 BPM

## 2024-12-02 ENCOUNTER — HOSPITAL ENCOUNTER (EMERGENCY)
Facility: HOSPITAL | Age: 79
Discharge: HOME | End: 2024-12-02
Payer: MEDICARE

## 2024-12-02 VITALS
WEIGHT: 205 LBS | OXYGEN SATURATION: 95 % | TEMPERATURE: 97.7 F | SYSTOLIC BLOOD PRESSURE: 109 MMHG | DIASTOLIC BLOOD PRESSURE: 64 MMHG | BODY MASS INDEX: 27.77 KG/M2 | HEART RATE: 75 BPM | RESPIRATION RATE: 18 BRPM | HEIGHT: 72 IN

## 2024-12-02 DIAGNOSIS — S80.812A ABRASION OF LEFT LOWER LEG WITH INFECTION, INITIAL ENCOUNTER: Primary | ICD-10-CM

## 2024-12-02 DIAGNOSIS — L08.9 ABRASION OF LEFT LOWER LEG WITH INFECTION, INITIAL ENCOUNTER: Primary | ICD-10-CM

## 2024-12-02 DIAGNOSIS — Z23 NEED FOR DIPHTHERIA-TETANUS-PERTUSSIS (TDAP) VACCINE: ICD-10-CM

## 2024-12-02 PROCEDURE — 6370000000 HC RX 637 (ALT 250 FOR IP): Performed by: PHYSICIAN ASSISTANT

## 2024-12-02 PROCEDURE — 90471 IMMUNIZATION ADMIN: CPT | Performed by: PHYSICIAN ASSISTANT

## 2024-12-02 PROCEDURE — 6360000002 HC RX W HCPCS: Performed by: PHYSICIAN ASSISTANT

## 2024-12-02 PROCEDURE — 99284 EMERGENCY DEPT VISIT MOD MDM: CPT

## 2024-12-02 PROCEDURE — 90715 TDAP VACCINE 7 YRS/> IM: CPT | Performed by: PHYSICIAN ASSISTANT

## 2024-12-02 RX ORDER — MUPIROCIN 20 MG/G
OINTMENT TOPICAL
Qty: 3 G | Refills: 0 | Status: SHIPPED | OUTPATIENT
Start: 2024-12-02 | End: 2024-12-06

## 2024-12-02 RX ORDER — CEPHALEXIN 500 MG/1
500 CAPSULE ORAL 4 TIMES DAILY
Qty: 28 CAPSULE | Refills: 0 | Status: SHIPPED | OUTPATIENT
Start: 2024-12-02 | End: 2024-12-06

## 2024-12-02 RX ADMIN — CEPHALEXIN 500 MG: 250 CAPSULE ORAL at 17:35

## 2024-12-02 RX ADMIN — TETANUS TOXOID, REDUCED DIPHTHERIA TOXOID AND ACELLULAR PERTUSSIS VACCINE, ADSORBED 0.5 ML: 5; 2.5; 8; 8; 2.5 SUSPENSION INTRAMUSCULAR at 17:33

## 2024-12-02 NOTE — DISCHARGE INSTRUCTIONS
It was a pleasure taking care of you at [facility] Emergency Department today.  We know that when you come to [facility], you are entrusting us with your health, comfort, and safety.  Our physicians and nurses honor that trust, and we truly appreciate the opportunity to care for you and your loved ones.      We also value your feedback.  If you receive a survey about your Emergency Department experience today, please fill it out.  We care about our patients' feedback, and we listen to what you have to say.  Thank you!

## 2024-12-02 NOTE — ED PROVIDER NOTES
[facility] EMERGENCY DEP  EMERGENCY DEPARTMENT ENCOUNTER       Pt Name: Jad Brewer  MRN: [id number]  Birthdate 1945  Date of evaluation: 12/2/2024  Provider: SCARLET Cortes   PCP: Lyly Chow MD  Note Started: 4:58 PM EST 12/2/24     CHIEF COMPLAINT       Chief Complaint   Patient presents with    Laceration     Patient dropped a metal box on his left lower leg this past Tuesday. Here for wound check.      HISTORY OF PRESENT ILLNESS: 1 or more elements      History From: Patient  HPI Limitations: None     Jad Brewer is a 79 y.o. male who presents by POV for evaluation of wound to the anterior left lower leg. He reports accidentally dropping a metal box on his lower leg 6 days ago. He cleansed the wound and applied topical antibiotic cream. Over the last could days the wound has had a sticky drainage, increased swelling and surrounding erythema. Denies fever/chills. Unsure of last tetanus booster.      Nursing Notes were all reviewed and agreed with or any disagreements were addressed in the HPI.     REVIEW OF SYSTEMS      Review of Systems     Positives and Pertinent negatives as per HPI.    PAST HISTORY     Past Medical History:  Past Medical History:   Diagnosis Date    BPH (benign prostatic hyperplasia) 2018    with urinary frequency, sees Dr. Torres    Bradycardia 2017    as low 45, used to see cardio- Dr Mckeon in the past and had heart workup, managed by PCP-Breanna Perales    Exercise tolerance finding 06/13/2023    Able to climb one flight of stairs without stopping for SOB/CP.    History of rectal bleeding 04/2021    was evlauated by GI    History of urinary retention 10/05/2022    was evaluated and treated in ED, previous in 2021    Hyperlipidemia     per patient had in the past, not on meds currently    Inguinal hernia 2020    right, had surgery    Sepsis secondary to UTI (HCC) 2019    with septic shock-was hospitalized at [facility] and treated    UTI (urinary tract infection) 2019

## 2024-12-04 ENCOUNTER — HOSPITAL ENCOUNTER (OUTPATIENT)
Facility: HOSPITAL | Age: 79
Discharge: HOME | End: 2024-12-07
Payer: MEDICARE

## 2024-12-04 VITALS — HEIGHT: 72 IN | WEIGHT: 215 LBS | BODY MASS INDEX: 29.12 KG/M2

## 2024-12-04 DIAGNOSIS — Z01.818 PRE-OP TESTING: Primary | ICD-10-CM

## 2024-12-04 LAB
ANION GAP SERPL CALC-SCNC: 3 MMOL/L (ref 3–18)
BUN SERPL-MCNC: 12 MG/DL (ref 7–18)
BUN/CREAT SERPL: 12 (ref 12–20)
CALCIUM SERPL-MCNC: 8.8 MG/DL (ref 8.5–10.1)
CHLORIDE SERPL-SCNC: 110 MMOL/L (ref 100–111)
CO2 SERPL-SCNC: 29 MMOL/L (ref 21–32)
CREAT SERPL-MCNC: 0.99 MG/DL (ref 0.6–1.3)
ERYTHROCYTE [DISTWIDTH] IN BLOOD BY AUTOMATED COUNT: 13.5 % (ref 11.6–14.5)
GLUCOSE SERPL-MCNC: 90 MG/DL (ref 74–99)
HCT VFR BLD AUTO: 40.6 % (ref 36–48)
HGB BLD-MCNC: 13.7 G/DL (ref 13–16)
MCH RBC QN AUTO: 34.2 PG (ref 24–34)
MCHC RBC AUTO-ENTMCNC: 33.7 G/DL (ref 31–37)
MCV RBC AUTO: 101.2 FL (ref 78–100)
NRBC # BLD: 0 K/UL (ref 0–0.01)
NRBC BLD-RTO: 0 PER 100 WBC
PLATELET # BLD AUTO: 205 K/UL (ref 135–420)
PMV BLD AUTO: 10.7 FL (ref 9.2–11.8)
POTASSIUM SERPL-SCNC: 4.1 MMOL/L (ref 3.5–5.5)
RBC # BLD AUTO: 4.01 M/UL (ref 4.35–5.65)
SODIUM SERPL-SCNC: 142 MMOL/L (ref 136–145)
WBC # BLD AUTO: 6.6 K/UL (ref 4.6–13.2)

## 2024-12-04 PROCEDURE — 80048 BASIC METABOLIC PNL TOTAL CA: CPT

## 2024-12-04 PROCEDURE — 85027 COMPLETE CBC AUTOMATED: CPT

## 2024-12-04 NOTE — PERIOP NOTE
Patient denies being diabetic and not Lithium or Digoxin. Patient had EKG done on 9/12/2024 (with in 6 months per anesthesia)-graph in media.    Blood specimen sent to lab for CBC and BMP.

## 2024-12-11 NOTE — H&P
[facility] [place]  [address]  [facility] [address]  Tel:  [phone]  Fax: [phone]    Patient :  Jad Brewer  YOB: 1945  Birth Sex:  Male  Date:   12/05/2024 3:40 PM   Visit Type:    Office Visit  Assessment/Plan  #  Detail Type  Description   1.  Assessment  Enlarged prostate w/ LUTS (N40.1), Symptomatic.    Patient Plan  He is scheduled for aquablation on 12/12/24. Risks reviewed   Post-op medication: Tramadol 50 mg every 6 hours p.r.n. for pain.  New prescription provided.  Colace 100 mg b.i.d. p.r.n. for constipation.  New prescription provided  Send urine for culture. Will treat if positive for pre-op         2.  Assessment  Frequency of micturition (R35.0).         3.  Assessment  Nocturia (R35.1).         4.  Assessment  Acute epididymo-orchitis (N45.3).    Patient Plan  Stop cephalexin for skin infection  Start Bactrim DS b.i.d. #20 tabs   YANIRA ASAP         5.  Other Orders  Orders not associated to today's assessments.    Plan Orders  UA In Office No Micro to be performed and Urine Culture, Routine to be performed. Obtained on 12/05/2024.              Additional Visit Information    This 79 year old patient presents for bph, Elevated PSA Uro and Left testicle pain.    History of Present Illness  1.  bph   Severity level is 6. It occurs intermittently. The problem is worse. Reviewed today was a PSA taken on 07/04/2015 with findings of 5.58 ng/mL. There were no identified risk factors. Associated symptoms include dysuria, nocturia (4 times per night), slow stream, urgency, urinary frequency (every 1 hour) and urinary straining. Pertinent negatives include chills, constipation, fever, hematuria, incomplete emptying, intermittent stream, pelvic pain, pelvic pressure, pressure, splitting stream, suprapubic pain, urinary incontinence and dribbling. Additional information: Pt has been complaining of frequency and nocturia. he was treated with abx keflex for UtI  Reviewed  Adherence  Medication Name  Sig Desc  Elsewhere  Status  taking as directed  tamsulosin 0.4 mg capsule  take 1 capsule by oral route  every day 1/2 hour following the same meal each day  N  Verified    Allergies  Ingredient  Reaction (Severity)  Comment  NO KNOWN ALLERGIES        Reviewed, no changes.      Family History  Reviewed, no changes.  Last detailed document date:11/08/2023.     Social History  Reviewed, no changes. Last detailed document date: 11/08/2023.    Physical Exam  Exam  Findings  Details  Constitutional  Normal  Well developed.  Genitourinary  *  Epididymides - tender, left, indurated, left.    Medications (added, continued, or stopped today)  Start Date  Medication  Directions  PRN Status  PRN Reason  Instruction  Stop Date  12/05/2024  Bactrim  mg-160 mg tablet  take 1 tablet by oral route  every 12 hours  N        12/05/2024  cephalexin 500 mg capsule  take 1 capsule by oral route  every 8 hours  N      12/05/2024 12/05/2024  Colace 100 mg capsule  take 1 capsule by oral route  every day at bedtime as needed  N        04/25/2023  tamsulosin 0.4 mg capsule  take 1 capsule by oral route  every day 1/2 hour following the same meal each day  N        12/05/2024  tramadol 50 mg tablet  take 1 tablet by oral route  every 6 hours as needed  N          Prescription Drug Monitoring Report: Accessed by Breanna Diane NP on 12/5/2024 3:38:40 PM      Provider  Breanan Diane  12/05/2024 4:01 PM  Document generated by:  Breanna Diane 12/05/2024 04:01 PM      ----------------------------------------------------------------------------------------------------------------------------------------------------------------------      Electronically signed by Breanna Diane NP on 12/09/2024 12:38 PM

## 2024-12-12 ENCOUNTER — ANESTHESIA EVENT (OUTPATIENT)
Facility: HOSPITAL | Age: 79
End: 2024-12-12
Payer: MEDICARE

## 2024-12-12 ENCOUNTER — ANESTHESIA (OUTPATIENT)
Facility: HOSPITAL | Age: 79
End: 2024-12-12
Payer: MEDICARE

## 2024-12-12 ENCOUNTER — HOSPITAL ENCOUNTER (OUTPATIENT)
Facility: HOSPITAL | Age: 79
Setting detail: OUTPATIENT SURGERY
Discharge: HOME | End: 2024-12-12
Attending: UROLOGY | Admitting: UROLOGY
Payer: MEDICARE

## 2024-12-12 VITALS
SYSTOLIC BLOOD PRESSURE: 107 MMHG | BODY MASS INDEX: 29.57 KG/M2 | WEIGHT: 218.3 LBS | HEART RATE: 51 BPM | DIASTOLIC BLOOD PRESSURE: 65 MMHG | OXYGEN SATURATION: 97 % | HEIGHT: 72 IN | TEMPERATURE: 97.8 F | RESPIRATION RATE: 16 BRPM

## 2024-12-12 PROCEDURE — 2580000003 HC RX 258: Performed by: UROLOGY

## 2024-12-12 RX ORDER — SODIUM CHLORIDE 0.9 % (FLUSH) 0.9 %
5-40 SYRINGE (ML) INJECTION PRN
Status: CANCELLED | OUTPATIENT
Start: 2024-12-12

## 2024-12-12 RX ORDER — SODIUM CHLORIDE 0.9 % (FLUSH) 0.9 %
5-40 SYRINGE (ML) INJECTION EVERY 12 HOURS SCHEDULED
Status: CANCELLED | OUTPATIENT
Start: 2024-12-12

## 2024-12-12 RX ORDER — HYDROMORPHONE HYDROCHLORIDE 1 MG/ML
0.5 INJECTION, SOLUTION INTRAMUSCULAR; INTRAVENOUS; SUBCUTANEOUS EVERY 5 MIN PRN
Status: CANCELLED | OUTPATIENT
Start: 2024-12-12

## 2024-12-12 RX ORDER — SODIUM CHLORIDE, SODIUM LACTATE, POTASSIUM CHLORIDE, CALCIUM CHLORIDE 600; 310; 30; 20 MG/100ML; MG/100ML; MG/100ML; MG/100ML
INJECTION, SOLUTION INTRAVENOUS CONTINUOUS
Status: CANCELLED | OUTPATIENT
Start: 2024-12-12

## 2024-12-12 RX ORDER — DIPHENHYDRAMINE HYDROCHLORIDE 50 MG/ML
12.5 INJECTION INTRAMUSCULAR; INTRAVENOUS
Status: CANCELLED | OUTPATIENT
Start: 2024-12-12 | End: 2024-12-13

## 2024-12-12 RX ORDER — FENTANYL CITRATE 50 UG/ML
25 INJECTION, SOLUTION INTRAMUSCULAR; INTRAVENOUS EVERY 5 MIN PRN
Status: CANCELLED | OUTPATIENT
Start: 2024-12-12

## 2024-12-12 RX ORDER — ONDANSETRON 2 MG/ML
4 INJECTION INTRAMUSCULAR; INTRAVENOUS
Status: CANCELLED | OUTPATIENT
Start: 2024-12-12 | End: 2024-12-13

## 2024-12-12 RX ORDER — HYOSCYAMINE SULFATE 0.5 MG/ML
0.5 INJECTION, SOLUTION SUBCUTANEOUS EVERY 6 HOURS PRN
Status: CANCELLED | OUTPATIENT
Start: 2024-12-12 | End: 2024-12-14

## 2024-12-12 RX ORDER — CIPROFLOXACIN 500 MG/1
500 TABLET, FILM COATED ORAL EVERY 12 HOURS SCHEDULED
Status: CANCELLED | OUTPATIENT
Start: 2024-12-13

## 2024-12-12 RX ORDER — SODIUM CHLORIDE 9 MG/ML
INJECTION, SOLUTION INTRAVENOUS PRN
Status: CANCELLED | OUTPATIENT
Start: 2024-12-12

## 2024-12-12 RX ORDER — LABETALOL HYDROCHLORIDE 5 MG/ML
5 INJECTION, SOLUTION INTRAVENOUS
Status: CANCELLED | OUTPATIENT
Start: 2024-12-12

## 2024-12-12 RX ORDER — MIDAZOLAM HYDROCHLORIDE 2 MG/2ML
2 INJECTION, SOLUTION INTRAMUSCULAR; INTRAVENOUS
Status: CANCELLED | OUTPATIENT
Start: 2024-12-12 | End: 2024-12-13

## 2024-12-12 RX ORDER — SODIUM CHLORIDE 9 MG/ML
INJECTION, SOLUTION INTRAVENOUS CONTINUOUS
Status: DISCONTINUED | OUTPATIENT
Start: 2024-12-12 | End: 2024-12-12

## 2024-12-12 RX ORDER — NALOXONE HYDROCHLORIDE 0.4 MG/ML
INJECTION, SOLUTION INTRAMUSCULAR; INTRAVENOUS; SUBCUTANEOUS PRN
Status: CANCELLED | OUTPATIENT
Start: 2024-12-12

## 2024-12-12 RX ORDER — OXYCODONE HYDROCHLORIDE 5 MG/1
5 TABLET ORAL EVERY 4 HOURS PRN
Status: CANCELLED | OUTPATIENT
Start: 2024-12-12

## 2024-12-12 RX ORDER — ONDANSETRON 2 MG/ML
4 INJECTION INTRAMUSCULAR; INTRAVENOUS EVERY 6 HOURS PRN
Status: CANCELLED | OUTPATIENT
Start: 2024-12-12

## 2024-12-12 RX ORDER — LEVOFLOXACIN 5 MG/ML
500 INJECTION, SOLUTION INTRAVENOUS
Status: DISCONTINUED | OUTPATIENT
Start: 2024-12-12 | End: 2024-12-12 | Stop reason: HOSPADM

## 2024-12-12 RX ORDER — OXYCODONE HYDROCHLORIDE 5 MG/1
10 TABLET ORAL EVERY 4 HOURS PRN
Status: CANCELLED | OUTPATIENT
Start: 2024-12-12

## 2024-12-12 RX ORDER — ACETAMINOPHEN 325 MG/1
650 TABLET ORAL
Status: CANCELLED | OUTPATIENT
Start: 2024-12-12 | End: 2024-12-13

## 2024-12-12 RX ORDER — ONDANSETRON 4 MG/1
4 TABLET, ORALLY DISINTEGRATING ORAL EVERY 8 HOURS PRN
Status: CANCELLED | OUTPATIENT
Start: 2024-12-12

## 2024-12-12 RX ORDER — CIPROFLOXACIN 250 MG/1
TABLET, FILM COATED ORAL
COMMUNITY
Start: 2024-12-09

## 2024-12-12 RX ORDER — OXYCODONE HYDROCHLORIDE 5 MG/1
5 TABLET ORAL
Status: CANCELLED | OUTPATIENT
Start: 2024-12-12 | End: 2024-12-13

## 2024-12-12 RX ADMIN — SODIUM CHLORIDE: 9 INJECTION, SOLUTION INTRAVENOUS at 05:53

## 2024-12-12 ASSESSMENT — PAIN - FUNCTIONAL ASSESSMENT: PAIN_FUNCTIONAL_ASSESSMENT: 0-10

## 2024-12-12 NOTE — INTERVAL H&P NOTE
Update History & Physical    The patient's History and Physical of December 5, 2024 was reviewed with the patient and I examined the patient. There was no change. The surgical site was confirmed by the patient and me.     Plan: The risks, benefits, expected outcome, and alternative to the recommended procedure have been discussed with the patient. Patient understands and wants to proceed with the procedure.     Electronically signed by Duane oTrres MD on 12/12/2024 at 6:37 AM

## 2024-12-12 NOTE — ANESTHESIA PRE PROCEDURE
Department of Anesthesiology  Preprocedure Note       Name:  Jad Brewer   Age:  79 y.o.  :  1945                                          MRN:  [id number]         Date:  2024      Surgeon: Surgeon(s):  Duane Torres MD    Procedure: Procedure(s):  AQUABLATION OP 23    Medications prior to admission:   Prior to Admission medications    Medication Sig Start Date End Date Taking? Authorizing Provider   ciprofloxacin (CIPRO) 250 MG tablet Take by mouth 24  Yes Provider, MD Rowena       Current medications:    Current Facility-Administered Medications   Medication Dose Route Frequency Provider Last Rate Last Admin   • levoFLOXacin (LEVAQUIN) 500 MG/100ML infusion 500 mg  500 mg IntraVENous On Call to OR Duane Torres MD           Allergies:  No Known Allergies    Problem List:    Patient Active Problem List   Diagnosis Code   • Dyslipidemia E78.5   • Atrial arrhythmia I49.8   • ALEKSANDER (acute kidney injury) (HCC) N17.9   • Septic shock (HCC) A41.9, R65.21   • Advance directive discussed with patient Z71.89   • Precordial pain R07.2   • Bradycardia R00.1   • UTI (urinary tract infection) N39.0       Past Medical History:        Diagnosis Date   • Abrasion of ankle 2024    abrasion to left lower leg, on PO abx as of 24  by MD. Pt reports Dr. Torres is aware. notes in [facility]   • BPH (benign prostatic hyperplasia) 2018    with urinary frequency, hx cystoscopy & greenlight laser- sees Dr. Torres   • Bradycardia 2017    as low 45, saw cardio- Dr Mckeon in the past, hx heart workup, managed by PCP-Breanna Perales   • Exercise tolerance finding 2024    Able to climb one flight of stairs without stopping for SOB/CP.   • History of rectal bleeding 2021    was evlauated by GI   • History of urinary retention 10/05/2022    was evaluated and treated in ED, previous in    • Hyperlipidemia     hx in the past, no meds as of 24   • Inguinal hernia 2020    right, had surgery   • Sepsis  12/11/24    BMI:   Wt Readings from Last 3 Encounters:   12/12/24 99 kg (218 lb 4.8 oz)   12/04/24 97.5 kg (215 lb)   12/06/24 97.5 kg (215 lb)     Body mass index is 29.6 kg/m².    CBC:   Lab Results   Component Value Date/Time    WBC 6.6 12/04/2024 01:12 PM    RBC 4.01 12/04/2024 01:12 PM    HGB 13.7 12/04/2024 01:12 PM    HCT 40.6 12/04/2024 01:12 PM    .2 12/04/2024 01:12 PM    RDW 13.5 12/04/2024 01:12 PM     12/04/2024 01:12 PM       CMP:   Lab Results   Component Value Date/Time     12/04/2024 01:12 PM    K 4.1 12/04/2024 01:12 PM     12/04/2024 01:12 PM    CO2 29 12/04/2024 01:12 PM    BUN 12 12/04/2024 01:12 PM    CREATININE 0.99 12/04/2024 01:12 PM    GFRAA >60 04/22/2022 09:53 AM    AGRATIO 1.2 10/05/2022 12:12 AM    LABGLOM 77 12/04/2024 01:12 PM    LABGLOM 62 04/05/2024 08:58 PM    LABGLOM 52 10/05/2022 12:12 AM    GLUCOSE 90 12/04/2024 01:12 PM    CALCIUM 8.8 12/04/2024 01:12 PM    BILITOT 0.5 09/12/2024 06:20 PM    ALKPHOS 97 09/12/2024 06:20 PM    ALKPHOS 89 10/05/2022 12:12 AM    AST 24 09/12/2024 06:20 PM    ALT 18 09/12/2024 06:20 PM       POC Tests: No results for input(s): \"POCGLU\", \"POCNA\", \"POCK\", \"POCCL\", \"POCBUN\", \"POCHEMO\", \"POCHCT\" in the last 72 hours.    Coags:   Lab Results   Component Value Date/Time    PROTIME 10.8 08/15/2019 06:20 AM    INR 1.1 08/15/2019 06:20 AM    APTT 20.2 08/15/2019 06:20 AM       HCG (If Applicable): No results found for: \"PREGTESTUR\", \"PREGSERUM\", \"HCG\", \"HCGQUANT\"     ABGs: No results found for: \"PHART\", \"PO2ART\", \"SYU6ZJB\", \"TXY9UJD\", \"BEART\", \"T1HFXNZZ\"     Type & Screen (If Applicable):  No results found for: \"ABORH\", \"LABANTI\"    Drug/Infectious Status (If Applicable):  Lab Results   Component Value Date/Time    HEPCAB NONREACTIVE 06/29/2022 09:16 AM       COVID-19 Screening (If Applicable):   Lab Results   Component Value Date/Time    COVID19 Not detected 09/12/2024 06:34 PM           Anesthesia Evaluation  Patient summary

## 2024-12-12 NOTE — PERIOP NOTE
Pt procedure cancelled today due to equipment failure.  Pt to follow up with Dr. Torres to be rescheduled.

## 2024-12-12 NOTE — PERIOP NOTE
Reviewed PTA medication list with patient/caregiver and patient/caregiver denies any additional medications.     Patient admits to having a responsible adult care for them at home for at least 24 hours after surgery.    Patient encouraged to use gown warming system and informed that using said warming gown to regulate body temperature prior to a procedure has been shown to help reduce the risks of blood clots and infection.    Patient's pharmacy of choice verified and documented in PTA medication section.    Dual skin assessment & fall risk band verification completed with Cassie HERNANDEZ RN.

## 2024-12-12 NOTE — PERIOP NOTE
Spoke with patients wife and she is aware that there is a delay for the patient surgery, all questions have been answered and has been confirmed that she has been getting text message alerts.

## 2025-01-01 ENCOUNTER — ANESTHESIA EVENT (OUTPATIENT)
Facility: HOSPITAL | Age: 80
End: 2025-01-01
Payer: MEDICARE

## 2025-01-01 NOTE — H&P
[facility]  [address]  [facility] [address]  Tel:  [phone]  Fax: [phone]    Patient :  Jad Brewer  YOB: 1945  Birth Sex:  Male  Date:   12/05/2024 3:40 PM   Visit Type:    Office Visit  Assessment/Plan  #  Detail Type  Description   1.  Assessment  Enlarged prostate w/ LUTS (N40.1), Symptomatic.    Patient Plan  He is scheduled for aquablation on 12/12/24. Risks reviewed   Post-op medication: Tramadol 50 mg every 6 hours p.r.n. for pain.  New prescription provided.  Colace 100 mg b.i.d. p.r.n. for constipation.  New prescription provided  Send urine for culture. Will treat if positive for pre-op         2.  Assessment  Frequency of micturition (R35.0).         3.  Assessment  Nocturia (R35.1).         4.  Assessment  Acute epididymo-orchitis (N45.3).    Patient Plan  Stop cephalexin for skin infection  Start Bactrim DS b.i.d. #20 tabs   YANIRA ASAP         5.  Other Orders  Orders not associated to today's assessments.    Plan Orders  UA In Office No Micro to be performed and Urine Culture, Routine to be performed. Obtained on 12/05/2024.              Additional Visit Information    This 79 year old patient presents for bph, Elevated PSA Uro and Left testicle pain.    History of Present Illness  1.  bph   Severity level is 6. It occurs intermittently. The problem is worse. Reviewed today was a PSA taken on 07/04/2015 with findings of 5.58 ng/mL. There were no identified risk factors. Associated symptoms include dysuria, nocturia (4 times per night), slow stream, urgency, urinary frequency (every 1 hour) and urinary straining. Pertinent negatives include chills, constipation, fever, hematuria, incomplete emptying, intermittent stream, pelvic pain, pelvic pressure, pressure, splitting stream, suprapubic pain, urinary incontinence and dribbling. Additional information: Pt has been complaining of frequency and nocturia. he was treated with abx keflex for UtI  down to 3.7.  At this time given his age no intervention needed will repeat PSA in 1 year.    04/06/2022:Pt has an appointment to see Dr. Torres in 5 days from now.  04/11/2022.  Patient doing well he needs to have a follow-up in the next 2-3 months with a PSA prior.  06/27/2022  Patient with history of elevated PSA current PSA is stable at 4.4 no intervention is needed.  Follow-up 3 months to evaluate urinary symptoms.  01/17/2023  Patient with history of elevated PSA.  Last visit PSA was stable at 4.4.  He has not had a recent PSA.  He will have a PSA in 3 months follow-up with prostate exam at that time.  As long as that is stable no further PSAs will be needed  4/25/23: psa INCREASED TO 6.2. Options discussed due to LUTS and age we opted to proceed w/MRI for vision of possible targetable lesion and prostate volume finding.  5/26/2023  Reviewed prostate MRI completed 5/11/23. No suspicious focal or targetable lesions. Prostate volume 94.9 cc. Findings c/w BPH. No biopsy needed at this time.    3.  Left testicle pain   The patient reports he dropped a metal casing on his leg on last Monday.  He has been on cephalexin that was started yesterday he reports left groin and testicle pain over the last 3-4 days.  Tender to the touch.  He also states he has noted abnormal sediment in his urine        Medical/Surgical/Interim History  Reviewed, no change.   Last detailed document date:11/08/2023.     Problem List  Problem List reviewed.   Problem Description  Onset Date  Chronic  Clinical Status  Notes  Rectal hemorrhage  04/21/2022  N      Obstipation  04/21/2022  N        Medications (active prior to today)  Medication Name  Sig Description  Start Date  Stop Date  Refilled  Rx Elsewhere  tamsulosin 0.4 mg capsule  take 1 capsule by oral route  every day 1/2 hour following the same meal each day  04/25/2023 04/25/2023  N      Medication Reconciliation  Medications reconciled today.    Medication

## 2025-01-02 ENCOUNTER — HOSPITAL ENCOUNTER (OUTPATIENT)
Facility: HOSPITAL | Age: 80
Setting detail: OBSERVATION
Discharge: HOME | End: 2025-01-03
Attending: UROLOGY | Admitting: UROLOGY
Payer: MEDICARE

## 2025-01-02 ENCOUNTER — ANESTHESIA (OUTPATIENT)
Facility: HOSPITAL | Age: 80
End: 2025-01-02
Payer: MEDICARE

## 2025-01-02 PROBLEM — N40.1 BPH ASSOCIATED WITH NOCTURIA: Status: ACTIVE | Noted: 2025-01-02

## 2025-01-02 PROBLEM — R35.1 BPH ASSOCIATED WITH NOCTURIA: Status: ACTIVE | Noted: 2025-01-02

## 2025-01-02 PROCEDURE — 3600000002 HC SURGERY LEVEL 2 BASE: Performed by: UROLOGY

## 2025-01-02 PROCEDURE — 2580000003 HC RX 258: Performed by: UROLOGY

## 2025-01-02 PROCEDURE — 3700000001 HC ADD 15 MINUTES (ANESTHESIA): Performed by: UROLOGY

## 2025-01-02 PROCEDURE — 7100000000 HC PACU RECOVERY - FIRST 15 MIN: Performed by: UROLOGY

## 2025-01-02 PROCEDURE — C2596 PROBE, ROBOTIC, WATER-JET: HCPCS | Performed by: UROLOGY

## 2025-01-02 PROCEDURE — 3600000012 HC SURGERY LEVEL 2 ADDTL 15MIN: Performed by: UROLOGY

## 2025-01-02 PROCEDURE — G0378 HOSPITAL OBSERVATION PER HR: HCPCS

## 2025-01-02 PROCEDURE — 2580000003 HC RX 258: Performed by: NURSE ANESTHETIST, CERTIFIED REGISTERED

## 2025-01-02 PROCEDURE — 2500000003 HC RX 250 WO HCPCS: Performed by: NURSE ANESTHETIST, CERTIFIED REGISTERED

## 2025-01-02 PROCEDURE — 88305 TISSUE EXAM BY PATHOLOGIST: CPT

## 2025-01-02 PROCEDURE — 3700000000 HC ANESTHESIA ATTENDED CARE: Performed by: UROLOGY

## 2025-01-02 PROCEDURE — 6360000002 HC RX W HCPCS: Performed by: UROLOGY

## 2025-01-02 PROCEDURE — 2709999900 HC NON-CHARGEABLE SUPPLY: Performed by: UROLOGY

## 2025-01-02 PROCEDURE — 7100000001 HC PACU RECOVERY - ADDTL 15 MIN: Performed by: UROLOGY

## 2025-01-02 PROCEDURE — 6360000002 HC RX W HCPCS: Performed by: NURSE ANESTHETIST, CERTIFIED REGISTERED

## 2025-01-02 RX ORDER — DIPHENHYDRAMINE HYDROCHLORIDE 50 MG/ML
12.5 INJECTION INTRAMUSCULAR; INTRAVENOUS
Status: DISCONTINUED | OUTPATIENT
Start: 2025-01-02 | End: 2025-01-02 | Stop reason: HOSPADM

## 2025-01-02 RX ORDER — FENTANYL CITRATE 50 UG/ML
INJECTION, SOLUTION INTRAMUSCULAR; INTRAVENOUS
Status: DISCONTINUED | OUTPATIENT
Start: 2025-01-02 | End: 2025-01-02 | Stop reason: SDUPTHER

## 2025-01-02 RX ORDER — IPRATROPIUM BROMIDE AND ALBUTEROL SULFATE 2.5; .5 MG/3ML; MG/3ML
1 SOLUTION RESPIRATORY (INHALATION)
Status: DISCONTINUED | OUTPATIENT
Start: 2025-01-02 | End: 2025-01-02 | Stop reason: HOSPADM

## 2025-01-02 RX ORDER — ONDANSETRON 2 MG/ML
4 INJECTION INTRAMUSCULAR; INTRAVENOUS EVERY 6 HOURS PRN
Status: DISCONTINUED | OUTPATIENT
Start: 2025-01-02 | End: 2025-01-03 | Stop reason: HOSPADM

## 2025-01-02 RX ORDER — HYOSCYAMINE SULFATE 0.5 MG/ML
INJECTION, SOLUTION SUBCUTANEOUS
Status: DISCONTINUED | OUTPATIENT
Start: 2025-01-02 | End: 2025-01-02 | Stop reason: SDUPTHER

## 2025-01-02 RX ORDER — OXYCODONE HYDROCHLORIDE 5 MG/1
5 TABLET ORAL
Status: DISCONTINUED | OUTPATIENT
Start: 2025-01-02 | End: 2025-01-02 | Stop reason: HOSPADM

## 2025-01-02 RX ORDER — LABETALOL HYDROCHLORIDE 5 MG/ML
10 INJECTION, SOLUTION INTRAVENOUS
Status: DISCONTINUED | OUTPATIENT
Start: 2025-01-02 | End: 2025-01-02 | Stop reason: HOSPADM

## 2025-01-02 RX ORDER — SODIUM CHLORIDE 0.9 % (FLUSH) 0.9 %
5-40 SYRINGE (ML) INJECTION PRN
Status: DISCONTINUED | OUTPATIENT
Start: 2025-01-02 | End: 2025-01-03 | Stop reason: HOSPADM

## 2025-01-02 RX ORDER — LEVOFLOXACIN 5 MG/ML
500 INJECTION, SOLUTION INTRAVENOUS ONCE
Status: COMPLETED | OUTPATIENT
Start: 2025-01-02 | End: 2025-01-02

## 2025-01-02 RX ORDER — ONDANSETRON 2 MG/ML
INJECTION INTRAMUSCULAR; INTRAVENOUS
Status: DISCONTINUED | OUTPATIENT
Start: 2025-01-02 | End: 2025-01-02 | Stop reason: SDUPTHER

## 2025-01-02 RX ORDER — FENTANYL CITRATE 50 UG/ML
25 INJECTION, SOLUTION INTRAMUSCULAR; INTRAVENOUS EVERY 5 MIN PRN
Status: DISCONTINUED | OUTPATIENT
Start: 2025-01-02 | End: 2025-01-02 | Stop reason: HOSPADM

## 2025-01-02 RX ORDER — MIDAZOLAM HYDROCHLORIDE 1 MG/ML
INJECTION, SOLUTION INTRAMUSCULAR; INTRAVENOUS
Status: DISCONTINUED | OUTPATIENT
Start: 2025-01-02 | End: 2025-01-02 | Stop reason: SDUPTHER

## 2025-01-02 RX ORDER — PROPOFOL 10 MG/ML
INJECTION, EMULSION INTRAVENOUS
Status: DISCONTINUED | OUTPATIENT
Start: 2025-01-02 | End: 2025-01-02 | Stop reason: SDUPTHER

## 2025-01-02 RX ORDER — LIDOCAINE HYDROCHLORIDE 20 MG/ML
INJECTION, SOLUTION EPIDURAL; INFILTRATION; INTRACAUDAL; PERINEURAL
Status: DISCONTINUED | OUTPATIENT
Start: 2025-01-02 | End: 2025-01-02 | Stop reason: SDUPTHER

## 2025-01-02 RX ORDER — SODIUM CHLORIDE, SODIUM LACTATE, POTASSIUM CHLORIDE, CALCIUM CHLORIDE 600; 310; 30; 20 MG/100ML; MG/100ML; MG/100ML; MG/100ML
INJECTION, SOLUTION INTRAVENOUS
Status: DISCONTINUED | OUTPATIENT
Start: 2025-01-02 | End: 2025-01-02 | Stop reason: SDUPTHER

## 2025-01-02 RX ORDER — SODIUM CHLORIDE 0.9 % (FLUSH) 0.9 %
5-40 SYRINGE (ML) INJECTION PRN
Status: DISCONTINUED | OUTPATIENT
Start: 2025-01-02 | End: 2025-01-02 | Stop reason: HOSPADM

## 2025-01-02 RX ORDER — HYDROMORPHONE HYDROCHLORIDE 1 MG/ML
0.25 INJECTION, SOLUTION INTRAMUSCULAR; INTRAVENOUS; SUBCUTANEOUS EVERY 5 MIN PRN
Status: DISCONTINUED | OUTPATIENT
Start: 2025-01-02 | End: 2025-01-02 | Stop reason: HOSPADM

## 2025-01-02 RX ORDER — EPHEDRINE SULFATE/0.9% NACL/PF 50 MG/5 ML
SYRINGE (ML) INTRAVENOUS
Status: DISCONTINUED | OUTPATIENT
Start: 2025-01-02 | End: 2025-01-02 | Stop reason: SDUPTHER

## 2025-01-02 RX ORDER — SODIUM CHLORIDE 9 MG/ML
INJECTION, SOLUTION INTRAVENOUS CONTINUOUS
Status: DISCONTINUED | OUTPATIENT
Start: 2025-01-02 | End: 2025-01-03 | Stop reason: HOSPADM

## 2025-01-02 RX ORDER — SODIUM CHLORIDE 450 MG/100ML
INJECTION, SOLUTION INTRAVENOUS CONTINUOUS
Status: DISCONTINUED | OUTPATIENT
Start: 2025-01-02 | End: 2025-01-03 | Stop reason: HOSPADM

## 2025-01-02 RX ORDER — DEXAMETHASONE SODIUM PHOSPHATE 4 MG/ML
INJECTION, SOLUTION INTRA-ARTICULAR; INTRALESIONAL; INTRAMUSCULAR; INTRAVENOUS; SOFT TISSUE
Status: DISCONTINUED | OUTPATIENT
Start: 2025-01-02 | End: 2025-01-02 | Stop reason: SDUPTHER

## 2025-01-02 RX ORDER — ONDANSETRON 2 MG/ML
4 INJECTION INTRAMUSCULAR; INTRAVENOUS
Status: DISCONTINUED | OUTPATIENT
Start: 2025-01-02 | End: 2025-01-02 | Stop reason: HOSPADM

## 2025-01-02 RX ORDER — PHENYLEPHRINE HCL IN 0.9% NACL 1 MG/10 ML
SYRINGE (ML) INTRAVENOUS
Status: DISCONTINUED | OUTPATIENT
Start: 2025-01-02 | End: 2025-01-02 | Stop reason: SDUPTHER

## 2025-01-02 RX ORDER — SODIUM CHLORIDE, SODIUM LACTATE, POTASSIUM CHLORIDE, CALCIUM CHLORIDE 600; 310; 30; 20 MG/100ML; MG/100ML; MG/100ML; MG/100ML
INJECTION, SOLUTION INTRAVENOUS CONTINUOUS
Status: DISCONTINUED | OUTPATIENT
Start: 2025-01-02 | End: 2025-01-02 | Stop reason: HOSPADM

## 2025-01-02 RX ORDER — SODIUM CHLORIDE 0.9 % (FLUSH) 0.9 %
5-40 SYRINGE (ML) INJECTION EVERY 12 HOURS SCHEDULED
Status: DISCONTINUED | OUTPATIENT
Start: 2025-01-02 | End: 2025-01-02 | Stop reason: HOSPADM

## 2025-01-02 RX ORDER — OXYCODONE HYDROCHLORIDE 5 MG/1
10 TABLET ORAL EVERY 4 HOURS PRN
Status: DISCONTINUED | OUTPATIENT
Start: 2025-01-02 | End: 2025-01-03 | Stop reason: HOSPADM

## 2025-01-02 RX ORDER — SODIUM CHLORIDE 9 MG/ML
INJECTION, SOLUTION INTRAVENOUS PRN
Status: DISCONTINUED | OUTPATIENT
Start: 2025-01-02 | End: 2025-01-03 | Stop reason: HOSPADM

## 2025-01-02 RX ORDER — SODIUM CHLORIDE 0.9 % (FLUSH) 0.9 %
5-40 SYRINGE (ML) INJECTION EVERY 12 HOURS SCHEDULED
Status: DISCONTINUED | OUTPATIENT
Start: 2025-01-02 | End: 2025-01-03 | Stop reason: HOSPADM

## 2025-01-02 RX ORDER — ONDANSETRON 4 MG/1
4 TABLET, ORALLY DISINTEGRATING ORAL EVERY 8 HOURS PRN
Status: DISCONTINUED | OUTPATIENT
Start: 2025-01-02 | End: 2025-01-03 | Stop reason: HOSPADM

## 2025-01-02 RX ORDER — SODIUM CHLORIDE 9 MG/ML
INJECTION, SOLUTION INTRAVENOUS PRN
Status: DISCONTINUED | OUTPATIENT
Start: 2025-01-02 | End: 2025-01-02 | Stop reason: HOSPADM

## 2025-01-02 RX ORDER — OXYCODONE HYDROCHLORIDE 5 MG/1
5 TABLET ORAL EVERY 4 HOURS PRN
Status: DISCONTINUED | OUTPATIENT
Start: 2025-01-02 | End: 2025-01-03 | Stop reason: HOSPADM

## 2025-01-02 RX ADMIN — Medication 5 MG: at 08:49

## 2025-01-02 RX ADMIN — ONDANSETRON 4 MG: 2 INJECTION INTRAMUSCULAR; INTRAVENOUS at 09:53

## 2025-01-02 RX ADMIN — HYOSCYAMINE SULFATE 0.5 MG: 0.5 INJECTION, SOLUTION SUBCUTANEOUS at 09:55

## 2025-01-02 RX ADMIN — FENTANYL CITRATE 50 MCG: 50 INJECTION, SOLUTION INTRAMUSCULAR; INTRAVENOUS at 09:56

## 2025-01-02 RX ADMIN — Medication 100 MCG: at 09:16

## 2025-01-02 RX ADMIN — SODIUM CHLORIDE, SODIUM LACTATE, POTASSIUM CHLORIDE, AND CALCIUM CHLORIDE: 600; 310; 30; 20 INJECTION, SOLUTION INTRAVENOUS at 09:20

## 2025-01-02 RX ADMIN — MIDAZOLAM 1 MG: 1 INJECTION INTRAMUSCULAR; INTRAVENOUS at 08:32

## 2025-01-02 RX ADMIN — PROPOFOL 150 MG: 10 INJECTION, EMULSION INTRAVENOUS at 08:42

## 2025-01-02 RX ADMIN — Medication 100 MCG: at 09:06

## 2025-01-02 RX ADMIN — SODIUM CHLORIDE: 9 INJECTION, SOLUTION INTRAVENOUS at 06:14

## 2025-01-02 RX ADMIN — Medication 5 MG: at 09:50

## 2025-01-02 RX ADMIN — SODIUM CHLORIDE: 4.5 INJECTION, SOLUTION INTRAVENOUS at 22:52

## 2025-01-02 RX ADMIN — PROPOFOL 50 MG: 10 INJECTION, EMULSION INTRAVENOUS at 08:48

## 2025-01-02 RX ADMIN — SODIUM CHLORIDE: 4.5 INJECTION, SOLUTION INTRAVENOUS at 11:27

## 2025-01-02 RX ADMIN — MIDAZOLAM 1 MG: 1 INJECTION INTRAMUSCULAR; INTRAVENOUS at 08:42

## 2025-01-02 RX ADMIN — Medication 5 MG: at 09:13

## 2025-01-02 RX ADMIN — Medication 5 MG: at 08:51

## 2025-01-02 RX ADMIN — Medication 100 MCG: at 09:50

## 2025-01-02 RX ADMIN — LEVOFLOXACIN 500 MG: 5 INJECTION, SOLUTION INTRAVENOUS at 08:32

## 2025-01-02 RX ADMIN — Medication 100 MCG: at 08:53

## 2025-01-02 RX ADMIN — FENTANYL CITRATE 50 MCG: 50 INJECTION, SOLUTION INTRAMUSCULAR; INTRAVENOUS at 08:42

## 2025-01-02 RX ADMIN — Medication 5 MG: at 09:28

## 2025-01-02 RX ADMIN — LIDOCAINE HYDROCHLORIDE 60 MG: 20 INJECTION, SOLUTION EPIDURAL; INFILTRATION; INTRACAUDAL; PERINEURAL at 08:42

## 2025-01-02 RX ADMIN — DEXAMETHASONE SODIUM PHOSPHATE 4 MG: 4 INJECTION, SOLUTION INTRAMUSCULAR; INTRAVENOUS at 08:42

## 2025-01-02 ASSESSMENT — PAIN SCALES - GENERAL
PAINLEVEL_OUTOF10: 0

## 2025-01-02 ASSESSMENT — LIFESTYLE VARIABLES: SMOKING_STATUS: 0

## 2025-01-02 ASSESSMENT — PAIN - FUNCTIONAL ASSESSMENT: PAIN_FUNCTIONAL_ASSESSMENT: 0-10

## 2025-01-02 NOTE — OP NOTE
Operative Note      Patient: Jad Brewer  YOB: 1945  MRN: [id number]    Date of Procedure: 1/2/2025    Pre-Op Diagnosis Codes:      * Benign prostatic hyperplasia with lower urinary tract symptoms, symptom details unspecified [N40.1]    Post-Op Diagnosis: Same       Procedure(s):  AQUABLATION OP 23    Surgeon(s):  Duane Torres MD    Assistant:   * No surgical staff found *    Anesthesia: Other    Estimated Blood Loss (mL): Minimal    Complications: None    Specimens:   ID Type Source Tests Collected by Time Destination   A : prostate chips Tissue Prostate SURGICAL PATHOLOGY Duane Torres MD 1/2/2025 0901        Implants:  * No implants in log *      Drains:   Urinary Catheter 01/02/25 3 Way;Coude (Active)       Findings:  Infection Present At Time Of Surgery (PATOS) (choose all levels that have infection present):  No infection present      Detailed Description of Procedure:   After informed consent was obtained, the patient was taken to the operating room, and he underwent laryngeal mask anesthesia in the supine position. The patient is given antibiotics prophylaxis, and general anesthesia with paralysis is induced.  The [employer] system ([employer], [facility] [place], [place]) was used. The system includes three main components: robotic armpiece, console, and the conformal planning unit (CPU). The patient is then positioned in dorsal lithotomy and prepped. A transrectal ultrasound (TRUS) was inserted and fixed into position. Then, a 24-Fr robotic handpiece, which has an integrated flexible scope, was inserted into the prostatic urethra with the tip approximately 1-2 cm past the bladder neck. The TRUS was re-positioned using the sagittal view to center the prostate while maintaining view of the bladder neck and cystoscopy; moreover, a gentle compress of the prostatic fossa is applied to provide better visualization and aid in deeper ablation. The positioning was then confirmed using the  transverse TRUS view. After positioning was deemed adequate on the computer screen, we began planning the ablation process on the monitor by first defining the median lobe where applicable and the largest prostate cross-section to allow planning the depth and angle of ablation. Then, we defined the resection contour in a fashion which follows the adenoma dimensions and spares the bladder neck and carlos-verumontanum tissue. Once surgical planning is complete, the aquablation process was initiated using a foot pedal, causing the console pump to deliver a high-velocity saline jet orthogonally with adjustable flow rates. The ablation was performed automatically and followed the pre-defined contour. One additional pass was performed to remove any residual obstructive tissue. Selective bladder neck resection and cautery was performed additionally  to control any arterial bleeding encountered was addressed using bipolar cauterization to achieve hemostasis. After a 24fr 3 way couveliare tip garcia catheter was placed with urine light pink and a CBI was started. He was awakened from anesthesia and then transferred to the post anesthesia care unit.      Electronically signed by Duane Torres MD on 1/2/2025 at 10:01 AM

## 2025-01-02 NOTE — PERIOP NOTE
TRANSFER - IN REPORT:    Verbal report received from ORN & CRNA on Jad Brewer  being received from OR for routine progression of patient care      Report consisted of patient's Situation, Background, Assessment and   Recommendations(SBAR).     Information from the following report(s) Adult Overview, Surgery Report, Intake/Output, and MAR was reviewed with the receiving nurse.    Opportunity for questions and clarification was provided.      Assessment completed upon patient's arrival to unit and care assumed.

## 2025-01-02 NOTE — INTERVAL H&P NOTE
Update History & Physical    The patient's History and Physical of December 5, 2024 was reviewed with the patient and I examined the patient. There was no change. The surgical site was confirmed by the patient and me.     Plan: The risks, benefits, expected outcome, and alternative to the recommended procedure have been discussed with the patient. Patient understands and wants to proceed with the procedure.     Electronically signed by Duane Torres MD on 1/2/2025 at 6:55 AM

## 2025-01-02 NOTE — ANESTHESIA POSTPROCEDURE EVALUATION
Department of Anesthesiology  Postprocedure Note    Patient: Jad Brewer  MRN: [id number]  YOB: 1945  Date of evaluation: 1/2/2025    Procedure Summary       Date: 01/02/25 Room / Location: [facility] MAIN CYSTO / [facility] MAIN OR    Anesthesia Start: 0832 Anesthesia Stop: 1010    Procedure: AQUABLATION OP 23 (Bladder) Diagnosis:       Benign prostatic hyperplasia with lower urinary tract symptoms, symptom details unspecified      (Benign prostatic hyperplasia with lower urinary tract symptoms, symptom details unspecified [N40.1])    Surgeons: Duane Torres MD Responsible Provider: Juan Manuel Ray DO    Anesthesia Type: general ASA Status: 3            Anesthesia Type: No value filed.    Lance Phase I: Lance Score: 10    Lance Phase II:      Anesthesia Post Evaluation    Patient location during evaluation: bedside  Patient participation: complete - patient participated  Level of consciousness: awake and alert  Pain score: 4  Airway patency: patent  Nausea & Vomiting: no nausea and no vomiting  Cardiovascular status: hemodynamically stable  Respiratory status: acceptable, spontaneous ventilation and room air  Hydration status: euvolemic  Pain management: adequate    No notable events documented.

## 2025-01-02 NOTE — PERIOP NOTE
TRANSFER - OUT REPORT:    Verbal report given to ANTELMO Forrester on Jad Brewer  being transferred to [address] [place] [address] for routine progression of patient care       Report consisted of patient's Situation, Background, Assessment and   Recommendations(SBAR).     Information from the following report(s) Adult Overview, Surgery Report, Intake/Output, and MAR was reviewed with the receiving nurse.           Lines:   Peripheral IV 01/02/25 Posterior;Right Forearm (Active)   Site Assessment Clean, dry & intact 01/02/25 1010   Line Status Infusing 01/02/25 1010   Line Care Connections checked and tightened 01/02/25 1010   Phlebitis Assessment No symptoms 01/02/25 1010   Infiltration Assessment 0 01/02/25 1010   Dressing Status Clean, dry & intact 01/02/25 1010   Dressing Type Transparent 01/02/25 1010        Opportunity for questions and clarification was provided.      Patient transported with:  Registered Nurse

## 2025-01-02 NOTE — CARE COORDINATION
01/02/25 1603   Service Assessment   Patient Orientation Alert and Oriented;Person;Place;Situation;Self   Cognition Alert   History Provided By Patient   Primary Caregiver Self   Support Systems Spouse/Significant Other   Patient's Healthcare Decision Maker is: Legal Next of Kin   PCP Verified by CM Yes   Last Visit to PCP Within last year   Prior Functional Level Independent in ADLs/IADLs   Current Functional Level Independent in ADLs/IADLs   Can patient return to prior living arrangement Yes   Ability to make needs known: Good   Family able to assist with home care needs: Yes   Would you like for me to discuss the discharge plan with any other family members/significant others, and if so, who? No   Financial Resources None   Community Resources None   Social/Functional History   Lives With Spouse   Type of Home House   Home Layout One level   Home Access Level entry   Bathroom Shower/Tub Tub/Shower unit   Bathroom Toilet Standard   Bathroom Equipment None   Bathroom Accessibility Accessible   Home Equipment None   Receives Help From Family   Prior Level of Assist for ADLs Independent   Prior Level of Assist for Homemaking Independent   Homemaking Responsibilities No   Ambulation Assistance Independent   Prior Level of Assist for Transfers Independent   Active  Yes   Mode of Transportation Car   Occupation Retired   Discharge Planning   Type of Residence House   Living Arrangements Spouse/Significant Other   Current Services Prior To Admission None   Potential Assistance Needed N/A   DME Ordered? No   Potential Assistance Purchasing Medications No   Type of Home Care Services None   Patient expects to be discharged to: House   One/Two Story Residence One story   History of falls? 0   Services At/After Discharge   Transition of Care Consult (CM Consult) N/A   Services At/After Discharge None    Resource Information Provided? No   Mode of Transport at Discharge Self   Condition of Participation:

## 2025-01-02 NOTE — ANESTHESIA PRE PROCEDURE
Department of Anesthesiology  Preprocedure Note       Name:  Jad Brewer   Age:  79 y.o.  :  1945                                          MRN:  [id number]         Date:  2025      Surgeon: Surgeon(s):  Duane Torres MD    Procedure: Procedure(s):  AQUABLATION OP 23    Medications prior to admission:   Prior to Admission medications    Medication Sig Start Date End Date Taking? Authorizing Provider   ciprofloxacin (CIPRO) 250 MG tablet Take by mouth 24   Provider, MD Rowena       Current medications:    No current facility-administered medications for this encounter.     Current Outpatient Medications   Medication Sig Dispense Refill   • ciprofloxacin (CIPRO) 250 MG tablet Take by mouth         Allergies:  No Known Allergies    Problem List:    Patient Active Problem List   Diagnosis Code   • Dyslipidemia E78.5   • Atrial arrhythmia I49.8   • ALEKSANDER (acute kidney injury) (HCC) N17.9   • Septic shock (HCC) A41.9, R65.21   • Advance directive discussed with patient Z71.89   • Precordial pain R07.2   • Bradycardia R00.1   • UTI (urinary tract infection) N39.0       Past Medical History:        Diagnosis Date   • Abrasion of ankle 2024    abrasion to left lower leg, on PO abx as of 24  by MD. Pt reports Dr. Torres is aware. notes in [facility]   • BPH (benign prostatic hyperplasia) 2018    with urinary frequency, hx cystoscopy & greenlight laser- sees Dr. Torres   • Bradycardia 2017    as low 45, saw cardio- Dr Mckeon in the past, hx heart workup, managed by PCP-Breanna Perales   • Exercise tolerance finding 2024    Able to climb one flight of stairs without stopping for SOB/CP.   • History of rectal bleeding 2021    was evlauated by GI   • History of urinary retention 10/05/2022    was evaluated and treated in ED, previous in    • Hyperlipidemia     hx in the past, no meds as of 24   • Inguinal hernia     right, had surgery   • Sepsis secondary to UTI (HCC) 2019    with septic

## 2025-01-03 VITALS
HEART RATE: 59 BPM | TEMPERATURE: 97.6 F | BODY MASS INDEX: 29.82 KG/M2 | WEIGHT: 220.2 LBS | OXYGEN SATURATION: 98 % | HEIGHT: 72 IN | DIASTOLIC BLOOD PRESSURE: 57 MMHG | RESPIRATION RATE: 17 BRPM | SYSTOLIC BLOOD PRESSURE: 103 MMHG

## 2025-01-03 PROCEDURE — G0378 HOSPITAL OBSERVATION PER HR: HCPCS

## 2025-01-03 NOTE — DISCHARGE INSTRUCTIONS
Duane Torres M.D.  [facility]  [address], [address], [place] [address]  Office: [phone]  Fax:    [phone]    PROCEDURE: Procedure(s):  AQUABLATION OP 23    Notify [facility] IMMEDIATELY if any of the following occur:    You are unable to urinate.  Urgency to urinate is not uncommon.  You find yourself urinating small frequent amounts associated with severe lower abdominal discomfort.  Bright red blood with clots in the urine. Some reddish urine is not uncommon and should be treated with increasing the amount of fluids you drink.  Temperature above 101.5° and / or chills.  You are nauseous and / or vomiting and you cannot hold down any fluids.  Your pain is not controlled with the pain medication prescribed.    Special Considerations:     Do not drive for at least 24 hours after the procedure and until you are no longer taking narcotic pain medication and you are able to move and react without hesitation.      MEDICATIONS:  Pain   []  Norco®   []  Percocet®  [] Dilaudid®    []  Tramadol  [] Ketorolac   Antibiotics   []  Cipro   []  Keflex    [] Levaquin   []  Bactrim DS®      [] Cefuroxime   Urination   []  Vesicare®   []  Flomax      Burning   []  Pyridium®   []  UribelTM      Nausea   []  Zofran®   []  Phenergan®      Miscellaneous   []            [] Prescriptions Written on Chart    [x] Prescriptions sent Electronically prior to surgery           Our office will call you tomorrow to schedule your first follow-up appointment.    Please contact [facility] at [phone] or go to the nearest Emergency Department / Urgent Care facility for any other medical questions or concerns.        What to expect after Aquablation of the Prostate  Immediately after surgery   You will wake up in recovery room with a catheter draining urine to a bag on your leg.   You will go home with the catheter in place and that will be removed in my office either on Friday or Monday depending on

## 2025-01-03 NOTE — PROGRESS NOTES
Discharge instructions & AVS reviewed Pt, no questions or concerns voiced, Pt verbalized understanding of all education, IV removed w/o complications garcia education provided, Pt verbalized understanding. Pt switched over to leg bag and given night bag.

## 2025-01-03 NOTE — PROGRESS NOTES
Urology Progress Note    Patient: Jad Brewer MRN: [id number] SSN: [id number]    YOB: 1945  Age: 79 y.o. Sex: male    DOA: 2025 LOS:  LOS: 0 days              Subjective:   Patient had a good night.  No complaints..    Objective:    Visit Vitals  BP (!) 103/57   Pulse 59   Temp 97.6 °F (36.4 °C) (Oral)   Resp 17   Ht 1.829 m (6')   Wt 99.9 kg (220 lb 3.2 oz)   SpO2 98%   BMI 29.86 kg/m²     Temp (24hrs), Av.6 °F (36.4 °C), Min:97.5 °F (36.4 °C), Max:97.8 °F (36.6 °C)      Intake and Output:   07 -  1900  In: 1900 [I.V.:1800]  Out: 260 [Urine:250]  1901 -  0700  In: 500 [P.O.:500]  Out: -1600     Physical Exam:    Mendieta: Urine clear on slow drip CBI  Lab/Data Reviewed:  No results for input(s): \"NA\", \"K\", \"CL\", \"CO2\", \"BUN\", \"CREATININE\", \"GLUCOSE\", \"CALCIUM\" in the last 72 hours.   No results for input(s): \"WBC\", \"RBC\", \"HGB\", \"HCT\", \"MCV\", \"MCH\", \"MCHC\", \"RDW\", \"PLT\", \"MPV\" in the last 72 hours.    Medications Reviewed.    Assessment/Plan:   Principal Problem:    BPH associated with nocturia  Resolved Problems:    * No resolved hospital problems. *      Status Post:  Procedure(s):  AQUABLATION OP 23   Impression: Postop day #1  Status post aqua ablation  Doing very well    Plan:  DC CBI: Done  DC home with catheter to leg bag as well as day bag  Plug Mendieta catheter inflow port  Voiding trial my office between 8 and 9 AM Monday morning 2025    Duane Torres MD  January 3, 2025

## 2025-01-03 NOTE — DISCHARGE SUMMARY
Discharge Summary     Patient ID:  Jad Brewer  [id number]   79 y.o.  1945    Admit date: 1/2/2025    Discharge Date: 1/3/2025      Admitting Physician: Duane Torres MD     Discharge Physician: Duane Torres MD    Admission Diagnoses: Benign prostatic hyperplasia with lower urinary tract symptoms, symptom details unspecified [N40.1]  BPH associated with nocturia [N40.1, R35.1]    Last Procedure: Procedure(s):  AQUABLATION OP 23    Discharge Diagnoses: Principal Problem:    BPH associated with nocturia  Resolved Problems:    * No resolved hospital problems. *       Discharge Condition: BPH    Consults: none    Significant Diagnostic Studies: None    Hospital Course:   Normal hospital course for this procedure.    Disposition: Home    Patient Instructions:   Current Discharge Medication List                             Diet: As per preop    Activity: Reference my discharge instructions.        Signed:  Duane Torres MD  January 3, 2025  5:56 AM

## 2025-01-03 NOTE — CARE COORDINATION
RNCM spoke with patient at bedside related to self drive home.  Patient reports notifying provider at office when surgery was set up and cannot remember provider response.  Patient reports not having anyone that can  and wants to drive self home.  RNCM notified bedside RN and provider of discharge plan and transport.    Patient to discharge home w/no needs today 01/03/25.  Self transport home, car in parking lot of hospital.

## 2025-01-03 NOTE — PROGRESS NOTES
1925- Bedside and Verbal shift change report given to ANTELMO Cosby (oncoming nurse) by ANTELMO Forrester (offgoing nurse). Report included the following information Nurse Handoff Report, Adult Overview, Surgery Report, Intake/Output, MAR, and Recent Results.      1930- Shift assessment done as per flow sheet.    0715- Bedside and Verbal shift change report given to ANTELMO Forrester (oncoming nurse) by ANTELMO Cosby (offgoing nurse). Report included the following information Nurse Handoff Report, Adult Overview, Surgery Report, Intake/Output, MAR, and Recent Results.

## 2025-01-04 ENCOUNTER — HOSPITAL ENCOUNTER (EMERGENCY)
Facility: HOSPITAL | Age: 80
Discharge: HOME | End: 2025-01-04
Attending: EMERGENCY MEDICINE
Payer: MEDICARE

## 2025-01-04 VITALS
WEIGHT: 220 LBS | SYSTOLIC BLOOD PRESSURE: 114 MMHG | HEIGHT: 72 IN | BODY MASS INDEX: 29.8 KG/M2 | TEMPERATURE: 98 F | HEART RATE: 53 BPM | RESPIRATION RATE: 18 BRPM | OXYGEN SATURATION: 97 % | DIASTOLIC BLOOD PRESSURE: 77 MMHG

## 2025-01-04 DIAGNOSIS — T83.9XXA PROBLEM WITH FOLEY CATHETER, INITIAL ENCOUNTER (HCC): Primary | ICD-10-CM

## 2025-01-04 LAB
APPEARANCE UR: ABNORMAL
BACTERIA URNS QL MICRO: NEGATIVE /HPF
BILIRUB UR QL: NEGATIVE
COLOR UR: ABNORMAL
EPITH CASTS URNS QL MICRO: ABNORMAL /LPF
GLUCOSE UR STRIP.AUTO-MCNC: NEGATIVE MG/DL
HGB UR QL STRIP: ABNORMAL
KETONES UR QL STRIP.AUTO: NEGATIVE MG/DL
LEUKOCYTE ESTERASE UR QL STRIP.AUTO: ABNORMAL
NITRITE UR QL STRIP.AUTO: NEGATIVE
PH UR STRIP: 8 (ref 5–8)
PROT UR STRIP-MCNC: 100 MG/DL
RBC #/AREA URNS HPF: ABNORMAL /HPF (ref 0–5)
SP GR UR REFRACTOMETRY: 1.02 (ref 1–1.03)
URINE CULTURE IF INDICATED: ABNORMAL
UROBILINOGEN UR QL STRIP.AUTO: 0.2 EU/DL (ref 0.2–1)
WBC URNS QL MICRO: ABNORMAL /HPF (ref 0–4)

## 2025-01-04 PROCEDURE — 99283 EMERGENCY DEPT VISIT LOW MDM: CPT

## 2025-01-04 PROCEDURE — 87086 URINE CULTURE/COLONY COUNT: CPT

## 2025-01-04 PROCEDURE — 81001 URINALYSIS AUTO W/SCOPE: CPT

## 2025-01-04 PROCEDURE — 51702 INSERT TEMP BLADDER CATH: CPT

## 2025-01-04 RX ORDER — CEFDINIR 300 MG/1
300 CAPSULE ORAL 2 TIMES DAILY
Qty: 20 CAPSULE | Refills: 0 | Status: SHIPPED | OUTPATIENT
Start: 2025-01-04 | End: 2025-01-14

## 2025-01-04 ASSESSMENT — PAIN - FUNCTIONAL ASSESSMENT: PAIN_FUNCTIONAL_ASSESSMENT: 0-10

## 2025-01-04 ASSESSMENT — LIFESTYLE VARIABLES
HOW MANY STANDARD DRINKS CONTAINING ALCOHOL DO YOU HAVE ON A TYPICAL DAY: PATIENT DOES NOT DRINK
HOW OFTEN DO YOU HAVE A DRINK CONTAINING ALCOHOL: NEVER

## 2025-01-04 ASSESSMENT — PAIN SCALES - GENERAL: PAINLEVEL_OUTOF10: 0

## 2025-01-04 NOTE — ED PROVIDER NOTES
[facility] EMERGENCY DEP  EMERGENCY DEPARTMENT ENCOUNTER       Pt Name: Jad Brewer  MRN: [id number]  Birthdate 1945  Date of evaluation: 1/4/2025  Provider: Christen Christensen MD   PCP: Lyly Chow MD  Note Started: 11:48 AM EST 1/4/25     CHIEF COMPLAINT       Chief Complaint   Patient presents with    garcia leaking        HISTORY OF PRESENT ILLNESS: 1 or more elements      History From: patient, History limited by: none     Jad Brewer is a 79 y.o. male presents to the emergency department complaining of Garcia leaking.       Please See MDM for Additional Details of the HPI/PMH  Nursing Notes were all reviewed and agreed with or any disagreements were addressed in the HPI.     REVIEW OF SYSTEMS        Positives and Pertinent negatives as per HPI.    PAST HISTORY     Past Medical History:  Past Medical History:   Diagnosis Date    Abrasion of ankle 11/26/2024    abrasion to left lower leg, on PO abx as of 12/6/24  by MD. Pt reports Dr. Torres is aware. notes in [facility]    BPH (benign prostatic hyperplasia) 2018    with urinary frequency, hx cystoscopy & greenlight laser- sees Dr. Torres    Bradycardia 2017    as low 45, saw cardio- Dr Mckeon in the past, hx heart workup, managed by PCP-Breanna Perales    Exercise tolerance finding 12/2024    Able to climb one flight of stairs without stopping for SOB/CP.    History of rectal bleeding 04/2021    was evlauated by GI    History of urinary retention 10/05/2022    was evaluated and treated in ED, previous in 2021    Hyperlipidemia     hx in the past, no meds as of 12/6/24    Inguinal hernia 2020    right, had surgery    Sepsis secondary to UTI (HCC) 2019    with septic shock-was hospitalized 1 week at [facility] and treated    UTI (urinary tract infection) 2019    Hospitalized at [facility] in 8/15-8/20/2019 with UTI/sepsis/septic shock    Wears dentures     upper-full, lower-partials    Wears hearing aid 2003    bilateral    Wears prescription eyeglasses        Past Surgical

## 2025-01-04 NOTE — DISCHARGE INSTRUCTIONS
Your catheter was changed in the emergency department today.  You had some white blood cells in your urine that could be a sign of infection.  I sent an antibiotic to your pharmacy for you.  Please come back to the emergency department immediately for any new or worsening symptoms.  Follow-up with your urologist as already scheduled.

## 2025-01-05 LAB
BACTERIA SPEC CULT: NORMAL
SERVICE CMNT-IMP: NORMAL

## 2025-09-02 RX ORDER — VIBEGRON 75 MG/1
75 TABLET, FILM COATED ORAL DAILY
COMMUNITY

## 2025-09-02 RX ORDER — DOCUSATE SODIUM 100 MG/1
100 CAPSULE, LIQUID FILLED ORAL DAILY
COMMUNITY
Start: 2024-12-05

## 2025-09-03 ENCOUNTER — ANESTHESIA EVENT (OUTPATIENT)
Facility: HOSPITAL | Age: 80
End: 2025-09-03
Payer: MEDICARE

## 2025-09-03 RX ORDER — PROCHLORPERAZINE EDISYLATE 5 MG/ML
5 INJECTION INTRAMUSCULAR; INTRAVENOUS
Status: CANCELLED | OUTPATIENT
Start: 2025-09-03

## 2025-09-03 RX ORDER — GLUCAGON 1 MG/ML
1 KIT INJECTION PRN
Status: CANCELLED | OUTPATIENT
Start: 2025-09-03

## 2025-09-03 RX ORDER — HYDROMORPHONE HYDROCHLORIDE 1 MG/ML
0.5 INJECTION, SOLUTION INTRAMUSCULAR; INTRAVENOUS; SUBCUTANEOUS EVERY 5 MIN PRN
Refills: 0 | Status: CANCELLED | OUTPATIENT
Start: 2025-09-03

## 2025-09-03 RX ORDER — ONDANSETRON 2 MG/ML
4 INJECTION INTRAMUSCULAR; INTRAVENOUS
Status: CANCELLED | OUTPATIENT
Start: 2025-09-03

## 2025-09-03 RX ORDER — SODIUM CHLORIDE 0.9 % (FLUSH) 0.9 %
5-40 SYRINGE (ML) INJECTION PRN
Status: CANCELLED | OUTPATIENT
Start: 2025-09-03

## 2025-09-03 RX ORDER — DEXTROSE MONOHYDRATE 100 MG/ML
INJECTION, SOLUTION INTRAVENOUS CONTINUOUS PRN
Status: CANCELLED | OUTPATIENT
Start: 2025-09-03

## 2025-09-03 RX ORDER — SODIUM CHLORIDE 0.9 % (FLUSH) 0.9 %
5-40 SYRINGE (ML) INJECTION EVERY 12 HOURS SCHEDULED
Status: CANCELLED | OUTPATIENT
Start: 2025-09-03

## 2025-09-03 RX ORDER — IPRATROPIUM BROMIDE AND ALBUTEROL SULFATE 2.5; .5 MG/3ML; MG/3ML
1 SOLUTION RESPIRATORY (INHALATION)
Status: CANCELLED | OUTPATIENT
Start: 2025-09-03

## 2025-09-03 RX ORDER — FENTANYL CITRATE 50 UG/ML
25 INJECTION, SOLUTION INTRAMUSCULAR; INTRAVENOUS EVERY 5 MIN PRN
Refills: 0 | Status: CANCELLED | OUTPATIENT
Start: 2025-09-03

## 2025-09-03 RX ORDER — SODIUM CHLORIDE 9 MG/ML
INJECTION, SOLUTION INTRAVENOUS PRN
Status: CANCELLED | OUTPATIENT
Start: 2025-09-03

## 2025-09-04 ENCOUNTER — ANESTHESIA (OUTPATIENT)
Facility: HOSPITAL | Age: 80
End: 2025-09-04
Payer: MEDICARE

## 2025-09-04 ENCOUNTER — HOSPITAL ENCOUNTER (OUTPATIENT)
Facility: HOSPITAL | Age: 80
Setting detail: OUTPATIENT SURGERY
Discharge: HOME | End: 2025-09-04
Attending: ORTHOPAEDIC SURGERY | Admitting: ORTHOPAEDIC SURGERY
Payer: MEDICARE

## 2025-09-04 VITALS
WEIGHT: 207 LBS | BODY MASS INDEX: 28.04 KG/M2 | OXYGEN SATURATION: 94 % | HEIGHT: 72 IN | RESPIRATION RATE: 17 BRPM | TEMPERATURE: 98 F | DIASTOLIC BLOOD PRESSURE: 78 MMHG | HEART RATE: 53 BPM | SYSTOLIC BLOOD PRESSURE: 112 MMHG

## 2025-09-04 DIAGNOSIS — G89.18 POST-OP PAIN: Primary | ICD-10-CM

## 2025-09-04 PROCEDURE — 7100000000 HC PACU RECOVERY - FIRST 15 MIN: Performed by: ORTHOPAEDIC SURGERY

## 2025-09-04 PROCEDURE — 2500000003 HC RX 250 WO HCPCS: Performed by: ORTHOPAEDIC SURGERY

## 2025-09-04 PROCEDURE — 2709999900 HC NON-CHARGEABLE SUPPLY: Performed by: ORTHOPAEDIC SURGERY

## 2025-09-04 PROCEDURE — 6360000002 HC RX W HCPCS: Performed by: ORTHOPAEDIC SURGERY

## 2025-09-04 PROCEDURE — 2580000003 HC RX 258: Performed by: STUDENT IN AN ORGANIZED HEALTH CARE EDUCATION/TRAINING PROGRAM

## 2025-09-04 PROCEDURE — 3700000000 HC ANESTHESIA ATTENDED CARE: Performed by: ORTHOPAEDIC SURGERY

## 2025-09-04 PROCEDURE — 3600000002 HC SURGERY LEVEL 2 BASE: Performed by: ORTHOPAEDIC SURGERY

## 2025-09-04 PROCEDURE — 6360000002 HC RX W HCPCS: Performed by: ANESTHESIOLOGY

## 2025-09-04 PROCEDURE — 7100000010 HC PHASE II RECOVERY - FIRST 15 MIN: Performed by: ORTHOPAEDIC SURGERY

## 2025-09-04 PROCEDURE — 64415 NJX AA&/STRD BRCH PLXS IMG: CPT | Performed by: ANESTHESIOLOGY

## 2025-09-04 PROCEDURE — 2580000003 HC RX 258

## 2025-09-04 PROCEDURE — 3700000001 HC ADD 15 MINUTES (ANESTHESIA): Performed by: ORTHOPAEDIC SURGERY

## 2025-09-04 PROCEDURE — 7100000001 HC PACU RECOVERY - ADDTL 15 MIN: Performed by: ORTHOPAEDIC SURGERY

## 2025-09-04 PROCEDURE — 3600000012 HC SURGERY LEVEL 2 ADDTL 15MIN: Performed by: ORTHOPAEDIC SURGERY

## 2025-09-04 PROCEDURE — 6360000002 HC RX W HCPCS

## 2025-09-04 RX ORDER — MIDAZOLAM HYDROCHLORIDE 1 MG/ML
INJECTION, SOLUTION INTRAMUSCULAR; INTRAVENOUS
Status: DISCONTINUED | OUTPATIENT
Start: 2025-09-04 | End: 2025-09-04 | Stop reason: SDUPTHER

## 2025-09-04 RX ORDER — ACETAMINOPHEN 500 MG
1000 TABLET ORAL ONCE
Status: DISCONTINUED | OUTPATIENT
Start: 2025-09-04 | End: 2025-09-04 | Stop reason: HOSPADM

## 2025-09-04 RX ORDER — TRAMADOL HYDROCHLORIDE 50 MG/1
50 TABLET ORAL EVERY 6 HOURS PRN
Qty: 20 TABLET | Refills: 0 | Status: SHIPPED | OUTPATIENT
Start: 2025-09-04 | End: 2025-09-09

## 2025-09-04 RX ORDER — SODIUM CHLORIDE, SODIUM LACTATE, POTASSIUM CHLORIDE, CALCIUM CHLORIDE 600; 310; 30; 20 MG/100ML; MG/100ML; MG/100ML; MG/100ML
INJECTION, SOLUTION INTRAVENOUS
Status: DISCONTINUED | OUTPATIENT
Start: 2025-09-04 | End: 2025-09-04 | Stop reason: SDUPTHER

## 2025-09-04 RX ORDER — LIDOCAINE HYDROCHLORIDE 20 MG/ML
INJECTION, SOLUTION EPIDURAL; INFILTRATION; INTRACAUDAL; PERINEURAL
Status: DISCONTINUED | OUTPATIENT
Start: 2025-09-04 | End: 2025-09-04 | Stop reason: SDUPTHER

## 2025-09-04 RX ORDER — MIDAZOLAM HYDROCHLORIDE 1 MG/ML
INJECTION, SOLUTION INTRAMUSCULAR; INTRAVENOUS
Status: COMPLETED
Start: 2025-09-04 | End: 2025-09-04

## 2025-09-04 RX ORDER — ROPIVACAINE HYDROCHLORIDE 5 MG/ML
INJECTION, SOLUTION EPIDURAL; INFILTRATION; PERINEURAL
Status: DISCONTINUED | OUTPATIENT
Start: 2025-09-04 | End: 2025-09-04 | Stop reason: SDUPTHER

## 2025-09-04 RX ORDER — ROPIVACAINE HYDROCHLORIDE 5 MG/ML
INJECTION, SOLUTION EPIDURAL; INFILTRATION; PERINEURAL
Status: COMPLETED
Start: 2025-09-04 | End: 2025-09-04

## 2025-09-04 RX ORDER — CEFAZOLIN SODIUM 1 G/3ML
INJECTION, POWDER, FOR SOLUTION INTRAMUSCULAR; INTRAVENOUS
Status: DISCONTINUED
Start: 2025-09-04 | End: 2025-09-04 | Stop reason: HOSPADM

## 2025-09-04 RX ORDER — SODIUM CHLORIDE 0.9 % (FLUSH) 0.9 %
5-40 SYRINGE (ML) INJECTION EVERY 12 HOURS SCHEDULED
Status: DISCONTINUED | OUTPATIENT
Start: 2025-09-04 | End: 2025-09-04 | Stop reason: HOSPADM

## 2025-09-04 RX ORDER — SODIUM CHLORIDE, SODIUM LACTATE, POTASSIUM CHLORIDE, CALCIUM CHLORIDE 600; 310; 30; 20 MG/100ML; MG/100ML; MG/100ML; MG/100ML
INJECTION, SOLUTION INTRAVENOUS CONTINUOUS
Status: DISCONTINUED | OUTPATIENT
Start: 2025-09-04 | End: 2025-09-04 | Stop reason: HOSPADM

## 2025-09-04 RX ORDER — LIDOCAINE HYDROCHLORIDE 10 MG/ML
1 INJECTION, SOLUTION EPIDURAL; INFILTRATION; INTRACAUDAL; PERINEURAL
Status: DISCONTINUED | OUTPATIENT
Start: 2025-09-04 | End: 2025-09-04 | Stop reason: HOSPADM

## 2025-09-04 RX ORDER — SODIUM CHLORIDE 0.9 % (FLUSH) 0.9 %
5-40 SYRINGE (ML) INJECTION PRN
Status: DISCONTINUED | OUTPATIENT
Start: 2025-09-04 | End: 2025-09-04 | Stop reason: HOSPADM

## 2025-09-04 RX ORDER — WATER 10 ML/10ML
INJECTION INTRAMUSCULAR; INTRAVENOUS; SUBCUTANEOUS
Status: DISCONTINUED
Start: 2025-09-04 | End: 2025-09-04 | Stop reason: HOSPADM

## 2025-09-04 RX ORDER — SODIUM CHLORIDE 9 MG/ML
INJECTION, SOLUTION INTRAVENOUS PRN
Status: DISCONTINUED | OUTPATIENT
Start: 2025-09-04 | End: 2025-09-04 | Stop reason: HOSPADM

## 2025-09-04 RX ADMIN — LIDOCAINE HYDROCHLORIDE 50 MG: 20 INJECTION, SOLUTION EPIDURAL; INFILTRATION; INTRACAUDAL; PERINEURAL at 09:07

## 2025-09-04 RX ADMIN — PROPOFOL 100 MCG/KG/MIN: 10 INJECTION, EMULSION INTRAVENOUS at 09:07

## 2025-09-04 RX ADMIN — MIDAZOLAM HYDROCHLORIDE 2 MG: 1 INJECTION, SOLUTION INTRAMUSCULAR; INTRAVENOUS at 08:34

## 2025-09-04 RX ADMIN — ROPIVACAINE HYDROCHLORIDE 10 ML: 5 INJECTION, SOLUTION EPIDURAL; INFILTRATION; PERINEURAL at 08:39

## 2025-09-04 RX ADMIN — SODIUM CHLORIDE, SODIUM LACTATE, POTASSIUM CHLORIDE, AND CALCIUM CHLORIDE: .6; .31; .03; .02 INJECTION, SOLUTION INTRAVENOUS at 08:30

## 2025-09-04 RX ADMIN — SODIUM CHLORIDE, POTASSIUM CHLORIDE, SODIUM LACTATE AND CALCIUM CHLORIDE: 600; 310; 30; 20 INJECTION, SOLUTION INTRAVENOUS at 09:02

## 2025-09-04 RX ADMIN — WATER 2000 MG: 1 INJECTION INTRAMUSCULAR; INTRAVENOUS; SUBCUTANEOUS at 09:06

## 2025-09-04 RX ADMIN — ROPIVACAINE HYDROCHLORIDE 30 ML: 5 INJECTION, SOLUTION EPIDURAL; INFILTRATION; PERINEURAL at 08:38

## 2025-09-04 ASSESSMENT — PAIN SCALES - GENERAL
PAINLEVEL_OUTOF10: 0

## 2025-09-04 ASSESSMENT — PAIN - FUNCTIONAL ASSESSMENT: PAIN_FUNCTIONAL_ASSESSMENT: 0-10

## (undated) DEVICE — STERILE POLYISOPRENE POWDER-FREE SURGICAL GLOVES: Brand: PROTEXIS

## (undated) DEVICE — BANDAGE COMPR W2INXL5YD WHT BGE POLY COT M E WRP WV HK AND

## (undated) DEVICE — BAG  LEG  EX-TUBING  18IN  MEDIUM  20OZ

## (undated) DEVICE — SYR 3ML LL TIP 1/10ML GRAD --

## (undated) DEVICE — SYRINGE CATH TIP 50ML

## (undated) DEVICE — SUTURE PDS II SZ 0 L27IN ABSRB VLT L26MM CT-2 1/2 CIR Z334H

## (undated) DEVICE — TRI-LUMEN FILTERED TUBE SET WITH ACTIVATED CHARCOAL FILTER: Brand: AIRSEAL

## (undated) DEVICE — SOLUTION IRRIG 3000ML 0.9% SOD CHL USP UROMATIC PLAS CONT

## (undated) DEVICE — BAG DRNGE 19OZ VYN LEG RUB CAP ANTIREFLX VLV LEAK

## (undated) DEVICE — SYRINGE MED 30ML STD CLR PLAS LUERLOCK TIP N CTRL DISP

## (undated) DEVICE — GOWN,REINFORCED,POLY,AURORA,XLARGE,STRL: Brand: MEDLINE

## (undated) DEVICE — TRAP SPEC COLL POLYP POLYSTYR --

## (undated) DEVICE — STRAP,POSITIONING,KNEE/BODY,FOAM,4X60": Brand: MEDLINE

## (undated) DEVICE — 1230 DOUBLE MAGNET NEEDLE COUNTER,BLACK: Brand: DEVON

## (undated) DEVICE — TRNQT TEXT 1X18IN BLU LF DISP -- CONVERT TO ITEM 362165

## (undated) DEVICE — SOLUTION SCRB 4OZ 4% CHG CLN BASE FOR PT SKIN ANTISEPSIS

## (undated) DEVICE — SUTURE MONOCRYL SZ 3-0 L27IN ABSRB UD L19MM PS-2 3/8 CIR PRIM Y427H

## (undated) DEVICE — GARMENT,MEDLINE,DVT,INT,CALF,MED, GEN2: Brand: MEDLINE

## (undated) DEVICE — NDL PRT INJ NSAF BLNT 18GX1.5 --

## (undated) DEVICE — Device: Brand: AQUABEAM HANDPIECE

## (undated) DEVICE — POUCH DRNGE FLX BND INTEGR RAIL CLMP DISP EZ CTCH

## (undated) DEVICE — LASER FIBER: Brand: MOXY

## (undated) DEVICE — NDL FLTR TIP 5 MIC 18GX1.5IN --

## (undated) DEVICE — SNARE POLYP SM W13MMXL240CM SHTH DIA2.4MM OVL FLX DISP

## (undated) DEVICE — SYRINGE,TOOMEY,IRRIGATION,70CC,STERILE: Brand: MEDLINE

## (undated) DEVICE — MEDI-VAC NON-CONDUCTIVE SUCTION TUBING: Brand: CARDINAL HEALTH

## (undated) DEVICE — Device

## (undated) DEVICE — PLUG CATH TAPR GRP HNDL CAP

## (undated) DEVICE — DEVICE SECUREMENT CATH MED ADH HYDROCOLLOID STRL CATHGRIP LF

## (undated) DEVICE — SYR 5ML 1/5 GRAD LL NSAF LF --

## (undated) DEVICE — BANDAGE,ELASTIC,ESMARK,STERILE,4"X9',LF: Brand: MEDLINE

## (undated) DEVICE — STERILE POLYISOPRENE POWDER-FREE SURGICAL GLOVES WITH EMOLLIENT COATING: Brand: PROTEXIS

## (undated) DEVICE — GUIDEWIRE UROLOGICAL STR 3 CM 0.038 INX150 CM DUAL-FLEX

## (undated) DEVICE — DEVICE SECUREMENT 1/32IN POLYETH FOAM F ANCHR URIN CATH

## (undated) DEVICE — BASIN EMSIS 16OZ GRAPHITE PLAS KID SHP MOLD GRAD FOR ORAL

## (undated) DEVICE — BNDG ELAS HK LOOP 2X5YD NS -- MATRIX

## (undated) DEVICE — BANDAGE COBAN 4 IN COMPR W4INXL5YD FOAM COHESIVE QUIK STK SELF ADH SFT

## (undated) DEVICE — SYRINGE 50ML E/T

## (undated) DEVICE — CUTTING LOOP, BIPOLAR, 24/26 FR.: Brand: N.A.

## (undated) DEVICE — MAJ-1414 SINGLE USE ADPATER BIOPSY VALV: Brand: SINGLE USE ADAPTOR BIOPSY VALVE

## (undated) DEVICE — SOL IRRIGATION INJ NACL 0.9% 500ML BTL

## (undated) DEVICE — Z DISCONTINUED PER MEDLINE LINE GAS SAMPLING O2/CO2 LNG AD 13 FT NSL W/ TBNG FILTERLINE

## (undated) DEVICE — VISUALIZATION SYSTEM: Brand: CLEARIFY

## (undated) DEVICE — SYR 10ML LUER LOK 1/5ML GRAD --

## (undated) DEVICE — MAYO STAND COVER: Brand: CONVERTORS

## (undated) DEVICE — TUBING, SUCTION, 1/4" X 12', STRAIGHT: Brand: MEDLINE

## (undated) DEVICE — MASTISOL ADHESIVE LIQ 2/3ML

## (undated) DEVICE — CORD ES L12FT BPLR FRCP

## (undated) DEVICE — SOLUTION IRRIG 3000ML 0.9% SOD CHL FLX CONT 0797208] ICU MEDICAL INC]

## (undated) DEVICE — SKIN MARKER,REGULAR TIP WITH RULER: Brand: DEVON

## (undated) DEVICE — KENDALL RADIOLUCENT FOAM MONITORING ELECTRODE RECTANGULAR SHAPE: Brand: KENDALL

## (undated) DEVICE — AIRSEAL 8 MM ACCESS PORT AND LOW PROFILE OBTURATOR WITH BLADELESS OPTICAL TIP, 120 MM LENGTH: Brand: AIRSEAL

## (undated) DEVICE — CATHETER URETH FOL 3W STR TIP DUFOUR 24 FRX50 ML SIL LF

## (undated) DEVICE — SET ADMIN 16ML TBNG L100IN 2 Y INJ SITE IV PIGGY BK DISP

## (undated) DEVICE — GOWN SURG XL L47IN BLU NONREINFORCED HK AND LOOP AAMI LEV 3

## (undated) DEVICE — KIT DRP 3 ARM ACC DISP ENDOWRIST DA VINCI SI

## (undated) DEVICE — STERILE LATEX POWDER-FREE SURGICAL GLOVESWITH NITRILE COATING: Brand: PROTEXIS

## (undated) DEVICE — CATH IV SAFE STR 22GX1IN BLU -- PROTECTIV PLUS

## (undated) DEVICE — GLOVE SURG SZ 65 THK91MIL LTX FREE SYN POLYISOPRENE

## (undated) DEVICE — DRAPE,HAND,STERILE: Brand: MEDLINE

## (undated) DEVICE — TOWEL 4 PLY TISS 19X30 SUE WHT

## (undated) DEVICE — 1200 GUARD II KIT W/5MM TUBE W/O VAC TUBE: Brand: GUARDIAN

## (undated) DEVICE — PAD,NON-ADHERENT,3X8,STERILE,LF,1/PK: Brand: MEDLINE

## (undated) DEVICE — SOLIDIFIER MEDC 1200ML -- CONVERT TO 356117

## (undated) DEVICE — SEAL ENDOSCP INSTR 7FR BX SELF SEAL

## (undated) DEVICE — LINER,SEMI-RIGID,3000CC,50EA/CS: Brand: MEDLINE

## (undated) DEVICE — SYRINGE BLB FEED IV POLE BG 60ML

## (undated) DEVICE — COVER LT HNDL PLAS RIG 1 PER PK

## (undated) DEVICE — ERBE NESSY®PLATE 70 SPLIT; 72CM²; CABLE 3M: Brand: ERBE

## (undated) DEVICE — DRAPE,EXTREMITY,89X128,STERILE: Brand: MEDLINE

## (undated) DEVICE — BLADE,STAINLESS-STEEL,15,STRL,DISPOSABLE: Brand: MEDLINE

## (undated) DEVICE — NEEDLE HYPO 25GA L1.5IN BLU POLYPR HUB S STL REG BVL STR

## (undated) DEVICE — SUTURE ETHILON SZ 3-0 L18IN NONABSORBABLE BLK PS-2 L19MM 3/8 1669H

## (undated) DEVICE — PAD ABD W5XL9IN GEN USE NONADHESIVE EXTRA ABSRB SFT

## (undated) DEVICE — X-RAY DETECTABLE SPONGES,16 PLY: Brand: VISTEC

## (undated) DEVICE — GEL,ULTRASOUND,BOTTLE,8.5-OZ,CLEAR: Brand: MEDLINE INDUSTRIES, INC.

## (undated) DEVICE — SOLUTION IV 500ML 0.9% SOD CHL FLX CONT

## (undated) DEVICE — PACK DRAPE AQUA ABLATION

## (undated) DEVICE — DUAL LUMEN URETERAL CATHETER

## (undated) DEVICE — GAUZE,SPONGE,4"X4",16PLY,STRL,LF,10/TRAY: Brand: MEDLINE

## (undated) DEVICE — TROCAR: Brand: KII® OPTICAL ACCESS SYSTEM

## (undated) DEVICE — LAP CHOLE: Brand: MEDLINE INDUSTRIES, INC.

## (undated) DEVICE — COVER,TABLE,44X76,STERILE: Brand: MEDLINE

## (undated) DEVICE — PENCIL ES L3M BTTN SWCH S STL HEX LOK BLDE ELECTRD HOLSTER

## (undated) DEVICE — NEONATAL-ADULT SPO2 SENSOR: Brand: NELLCOR

## (undated) DEVICE — SUTURE DEV SZ 2-0 WND CLSR ABSRB GS-22 VLOC COVIDIEN VLOCM2145

## (undated) DEVICE — CYSTO PACK: Brand: MEDLINE INDUSTRIES, INC.

## (undated) DEVICE — CLEAN UP KIT: Brand: MEDLINE INDUSTRIES, INC.

## (undated) DEVICE — SUTURE MONOCRYL SZ 4-0 L27IN ABSRB UD L19MM PS-2 1/2 CIR PRIM Y426H

## (undated) DEVICE — DRAINBAG,ANTI-REFLUX TOWER,L/F,2000ML,LL: Brand: MEDLINE

## (undated) DEVICE — OBTRTR BLDELSS 8MM DISP -- DA VINCI - SNGL USE

## (undated) DEVICE — SOLUTION IRRIG 1000ML 09% SOD CHL USP PIC PLAS CONTAINER

## (undated) DEVICE — CANNULA CUSH AD W/ 14FT TBG

## (undated) DEVICE — ZIMMER® STERILE DISPOSABLE TOURNIQUET CUFF WITH PROTECTIVE SLEEVE AND PLC, DUAL PORT, SINGLE BLADDER, 18 IN. (46 CM)

## (undated) DEVICE — SUTURE MCRYL SZ 4-0 L18IN ABSRB UD L19MM PS-2 3/8 CIR PRIM Y496G

## (undated) DEVICE — TUBING, SUCTION, 1/4" X 10', STRAIGHT: Brand: MEDLINE

## (undated) DEVICE — SUTURE NONABSORBABLE MONOFILAMENT 4-0 FS-2 18 IN ETHILON 662H

## (undated) DEVICE — TOWEL SURG W17XL27IN BLU COT STD PREWASHED DELINTED 4 PER STRL PK

## (undated) DEVICE — LAPAROSCOPIC TROCAR SLEEVE/SINGLE USE: Brand: KII® OPTICAL ACCESS SYSTEM

## (undated) DEVICE — SPONGE GZ W4XL4IN COT 12 PLY TYP VII WVN C FLD DSGN

## (undated) DEVICE — GLOVE ORANGE PI 7   MSG9070

## (undated) DEVICE — DRAPE SHT 3 QTR PROXIMA 53X77 --

## (undated) DEVICE — PADDING CAST W4INXL4YD NONSTERILE COT COHESIVE HND TEARABLE

## (undated) DEVICE — 24FR BIPLR COAG ELECTRDE, STERILE: Brand: N.A.

## (undated) DEVICE — SOLUTION IV 1000ML 0.9% SOD CHL

## (undated) DEVICE — NEEDLE HYPO 18GA L1.5IN PNK POLYPR HUB S STL THN WALL FILL

## (undated) DEVICE — LEGGINGS, PAIR, 31X48, STERILE: Brand: MEDLINE

## (undated) DEVICE — GLOVE SURG SZ 7 L12IN FNGR THK79MIL GRN LTX FREE

## (undated) DEVICE — DERMABOND SKIN ADH 0.7ML --

## (undated) DEVICE — SPONGE GZ W4XL4IN RAYON POLY 4 PLY NONWOVEN FASTER WICKING

## (undated) DEVICE — BAG PRESSURE INFUSION 3 W STOPCOCK 3000 CC PREMIERPRO DISP

## (undated) DEVICE — SEAL INSTR CYSTO ADJ BX PRT SEAL FOR ACC UP TO 6FR

## (undated) DEVICE — INTENDED USE FOR SURGICAL MARKING ON INTACT SKIN, ALSO PROVIDES A PERMANENT METHOD OF IDENTIFYING OBJECTS IN THE OPERATING ROOM: Brand: WRITESITE® REGULAR TIP SKIN MARKER

## (undated) DEVICE — SUTURE MONOCRYL SZ 3-0 L27IN ABSRB UD PS-2 3/8 CIR REV CUT NDL MCP427H

## (undated) DEVICE — COVER MPLR TIP CRV SCIS ACC DA VINCI

## (undated) DEVICE — APPLICATOR SCRB 26ML TEAL STRL -- CHLORAPREP 26ML

## (undated) DEVICE — CATH SUC CTRL PRT TRIFLO 14FR --

## (undated) DEVICE — INSUFFLATION NEEDLE TO ESTABLISH PNEUMOPERITONEUM.: Brand: INSUFFLATION NEEDLE

## (undated) DEVICE — BANDAGE COMPR W4INXL5YD WHT BGE POLY COT M E WRP WV HK AND

## (undated) DEVICE — CUFF TRNQT W4XL18IN 2 PRT SGL BLDR CYL DISP

## (undated) DEVICE — SCRUB TRAY DRY SKIN PREM WNG SPNG SPNG STK ABSORBENT TOWEL

## (undated) DEVICE — CATHETER URET L70CM OD6FR OPN END FLEXIMA

## (undated) DEVICE — SUTURE VCRL SZ 3-0 L27IN ABSRB UD L26MM SH 1/2 CIR J416H

## (undated) DEVICE — STRIP SKIN CLSR W0.25XL4IN WHT SPUNBOUND FBR NYL HI ADH

## (undated) DEVICE — NEEDLE HYPO 18GA L1.5IN PNK S STL HUB POLYPR SHLD REG BVL

## (undated) DEVICE — SOLUTION IRRIG 1000ML 0.9% SOD CHL USP POUR PLAS BTL

## (undated) DEVICE — SINGLE PORT MANIFOLD: Brand: NEPTUNE 2

## (undated) DEVICE — WRISTBAND ID AD W2.5XL9.5CM RED VYN ADH CLSR UNI-PRINT